# Patient Record
Sex: FEMALE | Race: BLACK OR AFRICAN AMERICAN | NOT HISPANIC OR LATINO
[De-identification: names, ages, dates, MRNs, and addresses within clinical notes are randomized per-mention and may not be internally consistent; named-entity substitution may affect disease eponyms.]

---
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
## 2022-05-15 NOTE — ED PEDIATRIC NURSE NOTE - CARDIO ASSESSMENT
[facility]    Bedside and Verbal shift change report given to [facility] (oncoming nurse) by Valentin Orellana RN (offgoing nurse). Report included the following information SBAR, Kardex, MAR and Recent Results.     Situation:   Code Status: Full Code   Hospital Day: 1   Problem List:   Hospital Problems  Date Reviewed: 9/2/2014          Codes Class Noted POA    Multiple sclerosis ([facility] ICD-10-CM: G35  ICD-9-CM: 401  5/9/2022 Unknown              Background:   Past Medical History:   Past Medical History:   Diagnosis Date    MS (multiple sclerosis) ([facility]         Assessment:   Changes in Assessment throughout shift: No change to previous assessment     Patient has a central line: no Reasons if yes: n/a  Insertion date: n/a Last dressing date: n/a   Patient has Knight Cath: no Reasons if yes: n/a   Insertion date: n/a  Shift knight care completed: NO, n/a     Last Vitals:     Vitals:    05/14/22 1414 05/14/22 1423   BP: 123/71    Pulse: 76    Resp: 18    Temp: 98 °F (36.7 °C)    SpO2: 94%    Height:  4' 11\" (1.499 m)        PAIN    Pain Assessment    Pain Intensity 1: 0 (05/15/22 0402) Pain Intensity 1: 2 (12/29/14 1105)      Pain Location 1: Abdomen      Pain Intervention(s) 1: Medication (see MAR)  Patient Stated Pain Goal: 0 Patient Stated Pain Goal: 0  o Intervention effective: yes  o Other actions taken for pain:       Skin Assessment  Skin color    Condition/Temperature    Integrity Skin Integrity: Other (comment)  Turgor    Weekly Pressure Ulcer Documentation  Pressure  Injury Documentation: No Pressure Injury Noted-Pressure Ulcer Prevention Initiated  Wound Prevention & Protection Methods  Orientation of wound Orientation of Wound Prevention: Posterior  Location of Prevention Location of Wound Prevention: Sacrum/Coccyx  Dressing Present Dressing Present : No  Dressing Status    Wound Offloading Wound Offloading (Prevention Methods): Bed, pressure reduction mattress,Elevate heels,Pillows,Wheelchair,Turning     INTAKE/OUPUT  Date 05/14/22 0700 - 05/15/22 0659 05/15/22 0700 - 05/16/22 0659   Shift [id number] 1900-0659 24 Hour Total 1200-3905 2280-5639 24 Hour Total   INTAKE   Shift Total         OUTPUT   Urine  700 700        Urine Voided  700 700        Urine Occurrence(s) 1 x 1 x 2 x      Emesis/NG output           Emesis Occurrence(s)  0 x 0 x      Stool           Stool Occurrence(s) 1 x 1 x 2 x 0 x  0 x   Shift Total  700 700      NET  -700 -700      Weight (kg)             Recommendations:  1. Patient needs and requests: Toileting, repositioning    2. Pending tests/procedures: AM labs, 5/15     3. Functional Level/Equipment: Partial (one person) / Bed (comment); Bevely Merchant; Wheelchair    Fall Precautions:   Fall risk precautions were reinforced with the patient; she was instructed to call for help prior to getting up. The following fall risk precautions were continued: bed/ chair alarms, door signage, yellow bracelet and socks as well as update of the Be-Bound [place]obia tool in the patient's room. Rupesh Score: 4    HEALS Safety Check    A safety check occurred in the patient's room between off going nurse and oncoming nurse listed above. The safety check included the below items  Area Items   H  High Alert Medications - Verify all high alert medication drips (heparin, PCA, etc.)   E  Equipment - Suction is set up for ALL patients (with yanker)  - Red plugs utilized for all equipment (IV pumps, etc.)  - WOWs wiped down at end of shift.  - Room stocked with oxygen, suction, and other unit-specific supplies   A  Alarms - Bed alarm is set for fall risk patients  - Ensure chair alarm is in place and activated if patient is up in a chair   L  Lines - Check IV for any infiltration  - Torres bag is empty if patient has a Torres   - Tubing and IV bags are labeled   S  Safety   - Room is clean, patient is clean, and equipment is clean. - Hallways are clear from equipment besides carts.    - Fall bracelet on for fall risk patients  - Ensure room is clear and free of clutter  - Suction is set up for ALL patients (with ciscoker)  - Hallways are clear from equipment besides carts.    - Isolation precautions followed, supplies available outside room, sign posted     Christie Spear RN WDL

## 2022-11-03 NOTE — ED POST DISCHARGE NOTE - REASON FOR FOLLOW-UP
Other Post discharge phone call 9/29/18 875 AM Margarito BOWEN Oxybutynin Counseling:  I discussed with the patient the risks of oxybutynin including but not limited to skin rash, drowsiness, dry mouth, difficulty urinating, and blurred vision.

## 2023-09-26 NOTE — ED PROVIDER NOTE - DISPOSITION TYPE
What Type Of Note Output Would You Prefer (Optional)?: Bullet Format
How Severe Are Your Spot(S)?: mild
Have Your Spot(S) Been Treated In The Past?: has not been treated
Hpi Title: Evaluation of Skin Lesions
ADMIT

## 2023-10-02 ENCOUNTER — EMERGENCY (EMERGENCY)
Facility: HOSPITAL | Age: 23
LOS: 1 days | Discharge: HOME | End: 2023-10-02
Attending: EMERGENCY MEDICINE | Admitting: EMERGENCY MEDICINE
Payer: COMMERCIAL

## 2023-10-02 VITALS
HEART RATE: 100 BPM | SYSTOLIC BLOOD PRESSURE: 147 MMHG | OXYGEN SATURATION: 100 % | DIASTOLIC BLOOD PRESSURE: 91 MMHG | TEMPERATURE: 98 F | RESPIRATION RATE: 18 BRPM

## 2023-10-02 VITALS
SYSTOLIC BLOOD PRESSURE: 133 MMHG | TEMPERATURE: 99 F | OXYGEN SATURATION: 100 % | DIASTOLIC BLOOD PRESSURE: 81 MMHG | HEART RATE: 67 BPM | RESPIRATION RATE: 16 BRPM

## 2023-10-02 DIAGNOSIS — F29 UNSPECIFIED PSYCHOSIS NOT DUE TO A SUBSTANCE OR KNOWN PHYSIOLOGICAL CONDITION: ICD-10-CM

## 2023-10-02 DIAGNOSIS — F12.10 CANNABIS ABUSE, UNCOMPLICATED: ICD-10-CM

## 2023-10-02 LAB
ALBUMIN SERPL ELPH-MCNC: 4.6 G/DL — SIGNIFICANT CHANGE UP (ref 3.3–5)
ALP SERPL-CCNC: 59 U/L — SIGNIFICANT CHANGE UP (ref 40–120)
ALT FLD-CCNC: 15 U/L — SIGNIFICANT CHANGE UP (ref 4–33)
AMPHET UR-MCNC: NEGATIVE — SIGNIFICANT CHANGE UP
ANION GAP SERPL CALC-SCNC: 16 MMOL/L — HIGH (ref 7–14)
APAP SERPL-MCNC: <10 UG/ML — LOW (ref 15–25)
APPEARANCE UR: CLEAR — SIGNIFICANT CHANGE UP
AST SERPL-CCNC: 15 U/L — SIGNIFICANT CHANGE UP (ref 4–32)
BACTERIA # UR AUTO: ABNORMAL /HPF
BARBITURATES UR SCN-MCNC: NEGATIVE — SIGNIFICANT CHANGE UP
BASOPHILS # BLD AUTO: 0.05 K/UL — SIGNIFICANT CHANGE UP (ref 0–0.2)
BASOPHILS NFR BLD AUTO: 0.9 % — SIGNIFICANT CHANGE UP (ref 0–2)
BENZODIAZ UR-MCNC: NEGATIVE — SIGNIFICANT CHANGE UP
BILIRUB SERPL-MCNC: 0.4 MG/DL — SIGNIFICANT CHANGE UP (ref 0.2–1.2)
BILIRUB UR-MCNC: NEGATIVE — SIGNIFICANT CHANGE UP
BUN SERPL-MCNC: 7 MG/DL — SIGNIFICANT CHANGE UP (ref 7–23)
CALCIUM SERPL-MCNC: 9.4 MG/DL — SIGNIFICANT CHANGE UP (ref 8.4–10.5)
CAST: 2 /LPF — SIGNIFICANT CHANGE UP (ref 0–4)
CHLORIDE SERPL-SCNC: 97 MMOL/L — LOW (ref 98–107)
CO2 SERPL-SCNC: 24 MMOL/L — SIGNIFICANT CHANGE UP (ref 22–31)
COCAINE METAB.OTHER UR-MCNC: NEGATIVE — SIGNIFICANT CHANGE UP
COLOR SPEC: SIGNIFICANT CHANGE UP
CREAT SERPL-MCNC: 0.86 MG/DL — SIGNIFICANT CHANGE UP (ref 0.5–1.3)
CREATININE URINE RESULT, DAU: 461 MG/DL — SIGNIFICANT CHANGE UP
DIFF PNL FLD: NEGATIVE — SIGNIFICANT CHANGE UP
EGFR: 99 ML/MIN/1.73M2 — SIGNIFICANT CHANGE UP
EOSINOPHIL # BLD AUTO: 0.04 K/UL — SIGNIFICANT CHANGE UP (ref 0–0.5)
EOSINOPHIL NFR BLD AUTO: 0.7 % — SIGNIFICANT CHANGE UP (ref 0–6)
ETHANOL SERPL-MCNC: <10 MG/DL — SIGNIFICANT CHANGE UP
FLUAV AG NPH QL: SIGNIFICANT CHANGE UP
FLUBV AG NPH QL: SIGNIFICANT CHANGE UP
GLUCOSE SERPL-MCNC: 91 MG/DL — SIGNIFICANT CHANGE UP (ref 70–99)
GLUCOSE UR QL: NEGATIVE MG/DL — SIGNIFICANT CHANGE UP
HCG SERPL-ACNC: <1 MIU/ML — SIGNIFICANT CHANGE UP
HCT VFR BLD CALC: 40.4 % — SIGNIFICANT CHANGE UP (ref 34.5–45)
HGB BLD-MCNC: 12.7 G/DL — SIGNIFICANT CHANGE UP (ref 11.5–15.5)
IANC: 3.3 K/UL — SIGNIFICANT CHANGE UP (ref 1.8–7.4)
IMM GRANULOCYTES NFR BLD AUTO: 0.4 % — SIGNIFICANT CHANGE UP (ref 0–0.9)
KETONES UR-MCNC: 80 MG/DL
LEUKOCYTE ESTERASE UR-ACNC: ABNORMAL
LYMPHOCYTES # BLD AUTO: 1.36 K/UL — SIGNIFICANT CHANGE UP (ref 1–3.3)
LYMPHOCYTES # BLD AUTO: 24.4 % — SIGNIFICANT CHANGE UP (ref 13–44)
MCHC RBC-ENTMCNC: 25.8 PG — LOW (ref 27–34)
MCHC RBC-ENTMCNC: 31.4 GM/DL — LOW (ref 32–36)
MCV RBC AUTO: 81.9 FL — SIGNIFICANT CHANGE UP (ref 80–100)
METHADONE UR-MCNC: NEGATIVE — SIGNIFICANT CHANGE UP
MONOCYTES # BLD AUTO: 0.81 K/UL — SIGNIFICANT CHANGE UP (ref 0–0.9)
MONOCYTES NFR BLD AUTO: 14.5 % — HIGH (ref 2–14)
NEUTROPHILS # BLD AUTO: 3.3 K/UL — SIGNIFICANT CHANGE UP (ref 1.8–7.4)
NEUTROPHILS NFR BLD AUTO: 59.1 % — SIGNIFICANT CHANGE UP (ref 43–77)
NITRITE UR-MCNC: NEGATIVE — SIGNIFICANT CHANGE UP
NRBC # BLD: 0 /100 WBCS — SIGNIFICANT CHANGE UP (ref 0–0)
NRBC # FLD: 0 K/UL — SIGNIFICANT CHANGE UP (ref 0–0)
OPIATES UR-MCNC: NEGATIVE — SIGNIFICANT CHANGE UP
OXYCODONE UR-MCNC: NEGATIVE — SIGNIFICANT CHANGE UP
PCP SPEC-MCNC: SIGNIFICANT CHANGE UP
PCP UR-MCNC: NEGATIVE — SIGNIFICANT CHANGE UP
PH UR: 7 — SIGNIFICANT CHANGE UP (ref 5–8)
PLATELET # BLD AUTO: 310 K/UL — SIGNIFICANT CHANGE UP (ref 150–400)
POTASSIUM SERPL-MCNC: 3.4 MMOL/L — LOW (ref 3.5–5.3)
POTASSIUM SERPL-SCNC: 3.4 MMOL/L — LOW (ref 3.5–5.3)
PROT SERPL-MCNC: 7.9 G/DL — SIGNIFICANT CHANGE UP (ref 6–8.3)
PROT UR-MCNC: 100 MG/DL
RBC # BLD: 4.93 M/UL — SIGNIFICANT CHANGE UP (ref 3.8–5.2)
RBC # FLD: 14.6 % — HIGH (ref 10.3–14.5)
RBC CASTS # UR COMP ASSIST: 4 /HPF — SIGNIFICANT CHANGE UP (ref 0–4)
RSV RNA NPH QL NAA+NON-PROBE: SIGNIFICANT CHANGE UP
SALICYLATES SERPL-MCNC: <0.3 MG/DL — LOW (ref 15–30)
SARS-COV-2 RNA SPEC QL NAA+PROBE: SIGNIFICANT CHANGE UP
SODIUM SERPL-SCNC: 137 MMOL/L — SIGNIFICANT CHANGE UP (ref 135–145)
SP GR SPEC: 1.03 — HIGH (ref 1–1.03)
SQUAMOUS # UR AUTO: 2 /HPF — SIGNIFICANT CHANGE UP (ref 0–5)
THC UR QL: POSITIVE
TOXICOLOGY SCREEN, DRUGS OF ABUSE, SERUM RESULT: SIGNIFICANT CHANGE UP
TSH SERPL-MCNC: 2.26 UIU/ML — SIGNIFICANT CHANGE UP (ref 0.27–4.2)
UROBILINOGEN FLD QL: 1 MG/DL — SIGNIFICANT CHANGE UP (ref 0.2–1)
WBC # BLD: 5.58 K/UL — SIGNIFICANT CHANGE UP (ref 3.8–10.5)
WBC # FLD AUTO: 5.58 K/UL — SIGNIFICANT CHANGE UP (ref 3.8–10.5)
WBC UR QL: 25 /HPF — HIGH (ref 0–5)

## 2023-10-02 PROCEDURE — 70450 CT HEAD/BRAIN W/O DYE: CPT | Mod: 26,ME

## 2023-10-02 PROCEDURE — 99285 EMERGENCY DEPT VISIT HI MDM: CPT

## 2023-10-02 PROCEDURE — G1004: CPT

## 2023-10-02 PROCEDURE — 99284 EMERGENCY DEPT VISIT MOD MDM: CPT

## 2023-10-02 RX ORDER — FLUOXETINE HCL 10 MG
1 CAPSULE ORAL
Qty: 5 | Refills: 0
Start: 2023-10-02 | End: 2023-10-06

## 2023-10-02 RX ORDER — NITROFURANTOIN MACROCRYSTAL 50 MG
1 CAPSULE ORAL
Qty: 14 | Refills: 0
Start: 2023-10-02 | End: 2023-10-08

## 2023-10-02 RX ORDER — NITROFURANTOIN MACROCRYSTAL 50 MG
100 CAPSULE ORAL ONCE
Refills: 0 | Status: COMPLETED | OUTPATIENT
Start: 2023-10-02 | End: 2023-10-02

## 2023-10-02 RX ADMIN — Medication 100 MILLIGRAM(S): at 09:30

## 2023-10-02 RX ADMIN — Medication 2 MILLIGRAM(S): at 04:33
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
## 2023-10-02 NOTE — ED BEHAVIORAL HEALTH ASSESSMENT NOTE - NS ED BHA PLAN TR REFERRAL APPT DISCUSSED2 FT
encouraged to follow up with Dr Mueller this week. as back up, Pt was given resource to the community like [facility]

## 2023-10-02 NOTE — ED BEHAVIORAL HEALTH ASSESSMENT NOTE - DESCRIPTION
pt is labile, tearful,  at times smiling   Vital Signs Last 24 Hrs  T(C): 36.5 (02 Oct 2023 04:33), Max: 36.9 (02 Oct 2023 00:41)  T(F): 97.7 (02 Oct 2023 04:33), Max: 98.5 (02 Oct 2023 00:41)  HR: 70 (02 Oct 2023 04:33) (70 - 100)  BP: 113/81 (02 Oct 2023 04:33) (113/81 - 147/91)  BP(mean): 91 (02 Oct 2023 04:33) (91 - 91)  RR: 16 (02 Oct 2023 04:33) (16 - 18)  SpO2: 100% (02 Oct 2023 04:33) (100% - 100%)    Parameters below as of 02 Oct 2023 04:33  Patient On (Oxygen Delivery Method): room air single, college student none pt is labile, tearful,  at times smiling   Vital Signs Last 24 Hrs  T(C): 36.5 (02 Oct 2023 04:33), Max: 36.9 (02 Oct 2023 00:41)  T(F): 97.7 (02 Oct 2023 04:33), Max: 98.5 (02 Oct 2023 00:41)  HR: 70 (02 Oct 2023 04:33) (70 - 100)  BP: 113/81 (02 Oct 2023 04:33) (113/81 - 147/91)  BP(mean): 91 (02 Oct 2023 04:33) (91 - 91)  RR: 16 (02 Oct 2023 04:33) (16 - 18)  SpO2: 100% (02 Oct 2023 04:33) (100% - 100%)    Parameters below as of 02 Oct 2023 04:33  Patient On (Oxygen Delivery Method): room air    ON RE-EVALUATION (Dr Martinez, 10/2/2023 at 9:30AM):  There is no reported agitation/aggressive behavior.  No verbalization of active/ passive SI/HI.   There are no signs/symptoms of acute beck or florid psychosis.  she is not psychomotorically retarded. whilst anxious, nothing suggestive that she is in severe anxiety.  Pt is not showing any signs/symptoms of impending withdrawal.  she is not delirious.. Pt has not tested limits.. Has maintained appropriate boundaries. Pt has been easily redirected.  Overall, there has been no management issues.
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
## 2023-10-02 NOTE — ED PROVIDER NOTE - OBJECTIVE STATEMENT
22-year-old female with no significant past medical history presenting with odd behaviors over the last week.  The patient has been very tearful crying uncontrollably at times.  He also not slept for the last few days.  She does report at times she is hearing her parents voice in her head when they are not they are talking about divorce and financial difficulties.  She has concerns that people are talking about her at school behind her back as well.  No previous history of similar symptoms.  No SI HI.

## 2023-10-02 NOTE — ED BEHAVIORAL HEALTH ASSESSMENT NOTE - RISK ASSESSMENT
pt is at elevated risk for self harm due to mood dysregulation , insomnia   protective factors : supportive network, in school, no hx of sa, no family hx of sa  immediate risk factors: mood dyregulation, insomnia , psychosis will be mitigated by inpatient hospitalization pt is at elevated risk for self harm due to mood dysregulation , insomnia   protective factors : supportive network, in school, no hx of sa, no family hx of sa  immediate risk factors: mood dysregulation, insomnia , psychosis will be mitigated by inpatient hospitalization    ON RE-EVALUATION (Dr Martinez): the Pt is currently NOT in acute imminent risk of self harm as well as also NOT being at chronically elevated risk of self-harm.  currently, there is no identifiable acute increase in risk that  would be mitigated by an involuntary psychiatric admission. Pt recanted her request for a voluntary admission to in-Pt unit.      at this time, THE PATIENT IS NOT DEEMED AN IMMINENT THREAT TO SELF OR TO OTHERS IN THE COMMUNITY.

## 2023-10-02 NOTE — ED BEHAVIORAL HEALTH ASSESSMENT NOTE - NSBHMSETHTCONTENT_PSY_A_CORE
Delusions/Ruminations on re-evaluation, no delusions elicited. no active or passive SI or HI/Delusions/Ruminations

## 2023-10-02 NOTE — ED BEHAVIORAL HEALTH ASSESSMENT NOTE - NS ED BHA PLAN TR REFERRANT YES2 FT
extensively discussed with Pt, mother and 2 maternal aunts re: ongoing clinical course - the importance of compliance with meds and appts with Dr Mueller. discussed recommendations with Dr DESTINY Le extensively discussed with Pt, mother and 2 maternal aunts re: ongoing clinical course - the importance of compliance with meds and appts with Dr Mueller. Pt's family DID NOT EXPRESS ANY SAFETY CONCERNS FOT THIS PATIENT ENOUGH FOR  ED SERVICE TO PURSUE INVOLUNTARY PSYCH ADMISSION. in addition, discussed recommendations with Dr DESTINY Le (ED team)
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
## 2023-10-02 NOTE — ED PROVIDER NOTE - PROGRESS NOTE DETAILS
MD CHO:  I received s/o on this pt from Dr. Ortiz.  Plan:  pt was to be admitted to [facility], however, pt's family wished to have pt discharged.  Pt currently sleeping d/t ativan, per psych, they will reassess when she is awake to assess for pt safety.  Currently pt is deemed not to have capacity to leave at this time until psych clearance.  Will notify psych when pt awake. Roxane Med Tox Fellow: pt seen by , pt deemed safe for discharge, pt recinded voluntary admission. Pt has good support system at home. Pt has follow up with Dr. Perla her psychiatrist and is aware of crisis center/given info upon discharge.  Pt also with UTI, meds sent to pharmacy. Pt understands anticipatory guidance/return precautions. DC

## 2023-10-02 NOTE — ED BEHAVIORAL HEALTH ASSESSMENT NOTE - NSBHMSEBEHAV_PSY_A_CORE
Cooperative on re-evaluation, she continues to be cooperative/ not suspicious/ not paranoid/ not guarded/ not evasive/Cooperative

## 2023-10-02 NOTE — ED BEHAVIORAL HEALTH ASSESSMENT NOTE - SUMMARY
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 Perla, prescribed Prozac 20mg , no hx of sa , no hx of NSSIB , reports 2 weeks ago she was given by someone  "something to smoke", she is not sure what it was , denies other substances, drinks etoh socially , bib parents for labile mood and paranoia. 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 Perla, prescribed Prozac 20mg , no hx of sa , no hx of NSSIB , reports 2 weeks ago she was given by someone  "something to smoke", she is not sure what it was , denies other substances, drinks etoh socially , bib parents for labile mood and paranoia.    Pt presents with global insomnia, affective dysregulation , labile and psychotic sx , including ah/vh and paranoia . The sx may represent substance induced psychosis vs primary psychotic d/o vs bipolar d/o . Patient warrants psychiatric admission , and she is amenable to voluntary admission for safety and stabilization.   no beds are available at this time , pt will be boarding, family is aware and informed .  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 Mueller, prescribed Prozac 20mg , no hx of sa , no hx of NSSIB , reports 2 weeks ago she was given by someone  "something to smoke", she is not sure what it was , denies other substances, drinks etoh socially , bib parents for labile mood and paranoia.    Pt presents with global insomnia, affective dysregulation , labile and psychotic sx , including ah/vh and paranoia . The sx may represent substance induced psychosis vs primary psychotic d/o vs bipolar d/o . Patient warrants psychiatric admission , and she is amenable to voluntary admission for safety and stabilization.   no beds are available at this time , pt will be boarding, family is aware and informed .  no 1:1 required on the unit, not aggressive, and not suicidal    PROGRESS (Dr TUNDE Martinez reevaluated bedside at 9:30AM, 10/2/2023):   23/F with self reported hx of depression - currently on Prozac; no psych admissions; no reported hx of SA/ not engaged in any NSSIB;  Pt denied any hx of illicit substance use.  early this AM, presented to the ED accompanied by parents due to psychosis presenting as perceptual disturbances + paranoia with associated global insomnia and lability.      on re-evaluation, the Pt is not manifesting any SIGNS/ SYMPTOMS OF FLORID ACUTE PSYCHOSIS. SHE IS ABLE TO ENGAGE IN A MEANINGFUL/ LOGICAL/ COHERENT CONVERSATION.  Pt is NOT ACUTELY DISORGANIZED IN THOUGHT PROCESS, IS NOT DISORGANIZED IN SPEECH; DOES NOT APPEAR TO BE ACTIVELY INTERNALLY STIMULATED; NOT ENGAGED IN ANY STEREOTYPICAL BEHAVIOR.      in addition, whilst she did endorse feeling sad (for which she is being prescribed Prozac 20mg daily), the Pt is not manifesting any signs/ symptoms suggestive of severe MDD; No verbalization of active/ passive SI/HI.  whilst there is anxiety endorsed - anxiety symptoms is not psychotically driven at this time nor does Pt exhibit any signs/ symptoms suggestive of any severe specific anxiety disorder symptoms.  Pt is manifesting any signs/symptoms of acute beck.  Tox screen yielded + for THC; BAL < 10.  at this time, the Pt is not showing any signs/symptoms of acute intoxication or impending withdrawal.  Pt is not delirious.. Pt is easily redirected.  Overall, there has been no management issues reported overnight.      at this time,  ED service DOES NOT HAVE ANY JUSTIFICATION TO PURSUE AN INVOLUNTARY PSYCH ADMISSION. differentials to entertain for this case: primary psychosis vs substance induced psychosis - leaning more towards substance induced psychosis.  Pt initially expressed interest towards voluntary admission.  however, on re-evaluation, she rescinded this request.  Pt will be discharged back to the community.    RECOMMENDATIONS:   1. Psychoeducation provided.  Encouraged follow up with OP psych services and the importance of compliance with meds and psych out-Pt appts.  continue compliance with Prozac 20mg daily for control of depression + anxiety.  currently,  ED svc opines that there is no indication for initiation of emergent antipsychotic meds. initiation of this medication can be done on an out-Pt basis - if treating MD deems it appropriate for this Pt's case.  we also discussed impact of THC on her health and well being as well as the importance of sobriety.   2. Emergency protocol reviewed.  Pt, mother and maternal aunts were adviced to call 911 or come to the nearest ED should symptoms worsen; have increasing bouts of agitation/aggressive behavior; having SI/HI; or call [phone]    3. given resource to the community like [facility] (as back up if Dr Mueller is unable to follow up with her)  4. psych med prescribed: Prozac 20mg x 5 day supply

## 2023-10-02 NOTE — ED PROVIDER NOTE - PHYSICAL EXAMINATION
Vitals: I have reviewed the patients vital signs  General: nontoxic appearing  HEENT: Atraumatic, normocephalic, airway patent  Eyes: EOMI, tracking appropriately  Neck: no tracheal deviation  Chest/Lungs: no trauma, symmetric chest rise, speaking in complete sentences,  no resp distress  Heart: skin and extremities well perfused, regular rate and rhythm  Neuro: A+Ox3, appears non focal  MSK: no deformities  Skin: no cyanosis, no jaundice   Psych:  labile mood odd affect starts crying uncontrollably tangential speech

## 2023-10-02 NOTE — ED ADULT NURSE REASSESSMENT NOTE - NS ED NURSE REASSESS COMMENT FT1
Received report from PM RN. Patient is asleep and has family at bedside. VS stable and patient appears comfortable. Waiting for further evaluation and disposition.   JESSI Waldrop

## 2023-10-02 NOTE — ED PROVIDER NOTE - IV ALTEPLASE EXCL REL HIDDEN
show Anesthesia Volume In Cc: 0 Did You Provide Opioid Counseling: No Repair Type: Complex Repair Suturegard Retention Suture: 2-0 Nylon Retention Suture Bite Size: 3 mm Length To Time In Minutes Device Was In Place: 10 Number Of Hemigard Strips Per Side: 1 Simple / Intermediate / Complex Repair - Final Wound Length In Cm: 3.4 Complex Requirements: Extensive Undermining Performed?: Yes Width Of Defect Perpendicular To Closure In Cm (Required): 2.9 Distance Of Undermining In Cm (Required): 3.7 Undermining Type: Entire Wound Debridement Text: The wound edges were debrided prior to proceeding with the closure to facilitate wound healing. Helical Rim Text: The closure involved the helical rim. Vermilion Border Text: The closure involved the vermilion border. Nostril Rim Text: The closure involved the nostril rim. Retention Suture Text: Retention sutures were placed to support the closure and prevent dehiscence. Primary Defect Length (In Cm): 1.7 Primary Defect Width (In Cm): 0.9 Skin Substitute: EpiFix Micronized Suture Removal: 14 days Type Of Previous Surgery (Optional- Ie Mohs Surgery): MOHS Date Of Previous Surgery (Optional): 06/28/2023 Mohs Case Number (Optional): [id number] Date Of Previous Biopsy (Optional): 05/16/2023 Detail Level: Detailed Anesthesia Type: 1% lidocaine with epinephrine Additional Anesthesia Volume In Cc: 6 Hemostasis: Electrocautery Estimated Blood Loss (Cc): minimal Brow Lift Text: A midfrontal incision was made medially to the defect to allow access to the tissues just superior to the left eyebrow. Following careful dissection inferiorly in a supraperiosteal plane to the level of the left eyebrow, several 3-0 monocryl sutures were used to resuspend the eyebrow orbicularis oculi muscular unit to the superior frontal bone periosteum. This resulted in an appropriate reapproximation of static eyebrow symmetry and correction of the left brow ptosis. Deep Sutures: 3-0 Polysorb Epidermal Sutures: 3-0 Nylon Epidermal Closure: simple interrupted Additional Epidermal Closure (Optional): horizontal mattress Wound Care: Petrolatum Dressing: pressure dressing with telfa Unna Boot Text: An Unna boot was placed to help immobilize the limb and facilitate more rapid healing. Suturegard Intro: Intraoperative tissue expansion was performed, utilizing the SUTUREGARD device, in order to reduce wound tension. Suturegard Body: The suture ends were repeatedly re-tightened and re-clamped to achieve the desired tissue expansion. Hemigard Intro: Due to skin fragility and wound tension, it was decided to use HEMIGARD adhesive retention suture devices to permit a linear closure. The skin was cleaned and dried for a 6cm distance away from the wound. Excessive hair, if present, was removed to allow for adhesion. Hemigard Postcare Instructions: The HEMIGARD strips are to remain completely dry for at least 5-7 days. Graft Donor Site Bandage (Optional-Leave Blank If You Don't Want In Note): Steri-strips and a pressure bandage were applied to the donor site. Closure 2 Information: This tab is for additional flaps and grafts, including complex repair and grafts and complex repair and flaps. You can also specify a different location for the additional defect, if the location is the same you do not need to select a new one. We will insert the automated text for the repair you select below just as we do for solitary flaps and grafts. Please note that at this time if you select a location with a different insurance zone you will need to override the ICD10 and CPT if appropriate. Closure 3 Information: This tab is for additional flaps and grafts above and beyond our usual structured repairs. Please note if you enter information here it will not currently bill and you will need to add the billing information manually. Closure 4 Information: This tab is for additional flaps and grafts above and beyond our usual structured repairs.  Please note if you enter information here it will not currently bill and you will need to add the billing information manually. Complex Repair Preamble Text (Leave Blank If You Do Not Want): Extensive wide undermining was performed. Intermediate Repair Preamble Text (Leave Blank If You Do Not Want): Undermining was performed with blunt dissection. Flap Thinning Complex Repair Preamble Text (Leave Blank If You Do Not Want): An incision was made along the previous flap suture line. Undermining was performed beneath the flap and redundant tissue was removed to restore the normal contour of the skin. Non-Graft Cartilage Fenestration Text: The cartilage was fenestrated with a 2mm punch biopsy to help facilitate healing. Graft Cartilage Fenestration Text: The cartilage was fenestrated with a 2mm punch biopsy to help facilitate graft survival and healing. Preparation Of Recipient Site - Flap: The eschar was removed surgically with sharp dissection to facilitate appropriate wound healing of the following adjacent tissue rearrangement. Preparation Of Recipient Site - Graft: The eschar was removed surgically with sharp dissection to facilitate appropriate survival of the following graft. Preparation Of Recipient Site - Flap Takedown: The eschar and granulation tissue was removed surgically with sharp dissection to facilitate appropriate healing after division and inset of the proximal and distal interpolation flap. Secondary Intention Text (Leave Blank If You Do Not Want): The defect will heal with secondary intention. No Repair - Repaired With Adjacent Surgical Defect Text (Leave Blank If You Do Not Want): After obtaining clear surgical margins the defect was repaired concurrently with another surgical defect which was in close approximation. Referred To Oculoplastics For Closure Text (Leave Blank If You Do Not Want): After obtaining clear surgical margins the patient was sent to oculoplastics for surgical repair. The patient understands they will receive post-surgical care and follow-up from the referring physician's office. Referred To Otolaryngology For Closure Text (Leave Blank If You Do Not Want): After obtaining clear surgical margins the patient was sent to otolaryngology for surgical repair. The patient understands they will receive post-surgical care and follow-up from the referring physician's office. Referred To Plastics For Closure Text (Leave Blank If You Do Not Want): After obtaining clear surgical margins the patient was sent to plastics for surgical repair. The patient understands they will receive post-surgical care and follow-up from the referring physician's office. Referred To Asc For Closure Text (Leave Blank If You Do Not Want): After obtaining clear surgical margins the patient was sent to an [facility] for surgical repair. The patient understands they will receive post-surgical care and follow-up from the [facility] physician. Referred To Mid-Level For Closure Text (Leave Blank If You Do Not Want): After obtaining clear surgical margins the patient was sent to a mid-level provider for surgical repair. The patient understands they will receive post-surgical care and follow-up from the mid-level provider. Repair Performed By Another Provider Text (Leave Blank If You Do Not Want): After obtaining clear surgical margins the defect was repaired by another provider. Adjacent Tissue Transfer Text: The defect edges were debeveled with a #15 scalpel blade. Given the location of the defect and the proximity to free margins an adjacent tissue transfer was deemed most appropriate. Using a sterile surgical marker, an appropriate flap was drawn incorporating the defect and placing the expected incisions within the relaxed skin tension lines where possible. The area thus outlined was incised deep to adipose tissue with a #15 scalpel blade. The skin margins were undermined to an appropriate distance in all directions utilizing iris scissors and carried over to close the primary defect. Advancement Flap (Single) Text: The defect edges were debeveled with a #15 scalpel blade. Given the location of the defect and the proximity to free margins a single advancement flap was deemed most appropriate. Using a sterile surgical marker, an appropriate advancement flap was drawn incorporating the defect and placing the expected incisions within the relaxed skin tension lines where possible. The area thus outlined was incised deep to adipose tissue with a #15 scalpel blade. The skin margins were undermined to an appropriate distance in all directions utilizing iris scissors. Following this, the designed flap was advanced and carried over into the primary defect and sutured into place. Advancement Flap (Double) Text: The defect edges were debeveled with a #15 scalpel blade. Given the location of the defect and the proximity to free margins a double advancement flap was deemed most appropriate. Using a sterile surgical marker, the appropriate advancement flaps were drawn incorporating the defect and placing the expected incisions within the relaxed skin tension lines where possible. The area thus outlined was incised deep to adipose tissue with a #15 scalpel blade. The skin margins were undermined to an appropriate distance in all directions utilizing iris scissors. Following this, the designed flaps were advanced and carried over into the primary defect and sutured into place. Burow's Advancement Flap Text: The defect edges were debeveled with a #15 scalpel blade. Given the location of the defect and the proximity to free margins a Burow's advancement flap was deemed most appropriate. Using a sterile surgical marker, the appropriate advancement flap was drawn incorporating the defect and placing the expected incisions within the relaxed skin tension lines where possible. The area thus outlined was incised deep to adipose tissue with a #15 scalpel blade. The skin margins were undermined to an appropriate distance in all directions utilizing iris scissors. Following this, the designed flap was advanced and carried over into the primary defect and sutured into place. Chonodrocutaneous Helical Advancement Flap Text: The defect edges were debeveled with a #15 scalpel blade. Given the location of the defect and the proximity to free margins a chondrocutaneous helical advancement flap was deemed most appropriate. Using a sterile surgical marker, the appropriate advancement flap was drawn incorporating the defect and placing the expected incisions within the relaxed skin tension lines where possible. The area thus outlined was incised deep to adipose tissue with a #15 scalpel blade. The skin margins were undermined to an appropriate distance in all directions utilizing iris scissors. Following this, the designed flap was advanced and carried over into the primary defect and sutured into place. Crescentic Advancement Flap Text: The defect edges were debeveled with a #15 scalpel blade. Given the location of the defect and the proximity to free margins a crescentic advancement flap was deemed most appropriate. Using a sterile surgical marker, the appropriate advancement flap was drawn incorporating the defect and placing the expected incisions within the relaxed skin tension lines where possible. The area thus outlined was incised deep to adipose tissue with a #15 scalpel blade. The skin margins were undermined to an appropriate distance in all directions utilizing iris scissors. Following this, the designed flap was advanced and carried over into the primary defect and sutured into place. A-T Advancement Flap Text: The defect edges were debeveled with a #15 scalpel blade. Given the location of the defect, shape of the defect and the proximity to free margins an A-T advancement flap was deemed most appropriate. Using a sterile surgical marker, an appropriate advancement flap was drawn incorporating the defect and placing the expected incisions within the relaxed skin tension lines where possible. The area thus outlined was incised deep to adipose tissue with a #15 scalpel blade. The skin margins were undermined to an appropriate distance in all directions utilizing iris scissors. Following this, the designed flap was advanced and carried over into the primary defect and sutured into place. O-T Advancement Flap Text: The defect edges were debeveled with a #15 scalpel blade. Given the location of the defect, shape of the defect and the proximity to free margins an O-T advancement flap was deemed most appropriate. Using a sterile surgical marker, an appropriate advancement flap was drawn incorporating the defect and placing the expected incisions within the relaxed skin tension lines where possible. The area thus outlined was incised deep to adipose tissue with a #15 scalpel blade. The skin margins were undermined to an appropriate distance in all directions utilizing iris scissors. Following this, the designed flap was advanced and carried over into the primary defect and sutured into place. O-L Flap Text: The defect edges were debeveled with a #15 scalpel blade. Given the location of the defect, shape of the defect and the proximity to free margins an O-L flap was deemed most appropriate. Using a sterile surgical marker, an appropriate advancement flap was drawn incorporating the defect and placing the expected incisions within the relaxed skin tension lines where possible. The area thus outlined was incised deep to adipose tissue with a #15 scalpel blade. The skin margins were undermined to an appropriate distance in all directions utilizing iris scissors. Following this, the designed flap was advanced and carried over into the primary defect and sutured into place. O-Z Flap Text: The defect edges were debeveled with a #15 scalpel blade. Given the location of the defect, shape of the defect and the proximity to free margins an O-Z flap was deemed most appropriate. Using a sterile surgical marker, an appropriate transposition flap was drawn incorporating the defect and placing the expected incisions within the relaxed skin tension lines where possible. The area thus outlined was incised deep to adipose tissue with a #15 scalpel blade. The skin margins were undermined to an appropriate distance in all directions utilizing iris scissors. Following this, the designed flap was carried over into the primary defect and sutured into place. Double O-Z Flap Text: The defect edges were debeveled with a #15 scalpel blade. Given the location of the defect, shape of the defect and the proximity to free margins a Double O-Z flap was deemed most appropriate. Using a sterile surgical marker, an appropriate transposition flap was drawn incorporating the defect and placing the expected incisions within the relaxed skin tension lines where possible. The area thus outlined was incised deep to adipose tissue with a #15 scalpel blade. The skin margins were undermined to an appropriate distance in all directions utilizing iris scissors. Following this, the designed flap was carried over into the primary defect and sutured into place. V-Y Flap Text: The defect edges were debeveled with a #15 scalpel blade. Given the location of the defect, shape of the defect and the proximity to free margins a V-Y flap was deemed most appropriate. Using a sterile surgical marker, an appropriate advancement flap was drawn incorporating the defect and placing the expected incisions within the relaxed skin tension lines where possible. The area thus outlined was incised deep to adipose tissue with a #15 scalpel blade. The skin margins were undermined to an appropriate distance in all directions utilizing iris scissors. Following this, the designed flap was advanced and carried over into the primary defect and sutured into place. Advancement-Rotation Flap Text: The defect edges were debeveled with a #15 scalpel blade. Given the location of the defect, shape of the defect and the proximity to free margins an advancement-rotation flap was deemed most appropriate. Using a sterile surgical marker, an appropriate flap was drawn incorporating the defect and placing the expected incisions within the relaxed skin tension lines where possible. The area thus outlined was incised deep to adipose tissue with a #15 scalpel blade. The skin margins were undermined to an appropriate distance in all directions utilizing iris scissors. Following this, the designed flap was carried over into the primary defect and sutured into place. Mercedes Flap Text: The defect edges were debeveled with a #15 scalpel blade. Given the location of the defect, shape of the defect and the proximity to free margins a Mercedes flap was deemed most appropriate. Using a sterile surgical marker, an appropriate advancement flap was drawn incorporating the defect and placing the expected incisions within the relaxed skin tension lines where possible. The area thus outlined was incised deep to adipose tissue with a #15 scalpel blade. The skin margins were undermined to an appropriate distance in all directions utilizing iris scissors. Following this, the designed flap was advanced and carried over into the primary defect and sutured into place. Modified Advancement Flap Text: The defect edges were debeveled with a #15 scalpel blade. Given the location of the defect, shape of the defect and the proximity to free margins a modified advancement flap was deemed most appropriate. Using a sterile surgical marker, an appropriate advancement flap was drawn incorporating the defect and placing the expected incisions within the relaxed skin tension lines where possible. The area thus outlined was incised deep to adipose tissue with a #15 scalpel blade. The skin margins were undermined to an appropriate distance in all directions utilizing iris scissors. Following this, the designed flap was advanced and carried over into the primary defect and sutured into place. Mucosal Advancement Flap Text: Given the location of the defect, shape of the defect and the proximity to free margins a mucosal advancement flap was deemed most appropriate. Incisions were made with a 15 blade scalpel in the appropriate fashion along the cutaneous vermilion border and the mucosal lip. The remaining actinically damaged mucosal tissue was excised. The mucosal advancement flap was then elevated to the gingival sulcus with care taken to preserve the neurovascular structures and advanced into the primary defect. Care was taken to ensure that precise realignment of the vermilion border was achieved. Peng Advancement Flap Text: The defect edges were debeveled with a #15 scalpel blade. Given the location of the defect, shape of the defect and the proximity to free margins a Peng advancement flap was deemed most appropriate. Using a sterile surgical marker, an appropriate advancement flap was drawn incorporating the defect and placing the expected incisions within the relaxed skin tension lines where possible. The area thus outlined was incised deep to adipose tissue with a #15 scalpel blade. The skin margins were undermined to an appropriate distance in all directions utilizing iris scissors. Following this, the designed flap was advanced and carried over into the primary defect and sutured into place. Hatchet Flap Text: The defect edges were debeveled with a #15 scalpel blade. Given the location of the defect, shape of the defect and the proximity to free margins a hatchet flap was deemed most appropriate. Using a sterile surgical marker, an appropriate hatchet flap was drawn incorporating the defect and placing the expected incisions within the relaxed skin tension lines where possible. The area thus outlined was incised deep to adipose tissue with a #15 scalpel blade. The skin margins were undermined to an appropriate distance in all directions utilizing iris scissors. Following this, the designed flap was carried over into the primary defect and sutured into place. Rotation Flap Text: The defect edges were debeveled with a #15 scalpel blade. Given the location of the defect, shape of the defect and the proximity to free margins a rotation flap was deemed most appropriate. Using a sterile surgical marker, an appropriate rotation flap was drawn incorporating the defect and placing the expected incisions within the relaxed skin tension lines where possible. The area thus outlined was incised deep to adipose tissue with a #15 scalpel blade. The skin margins were undermined to an appropriate distance in all directions utilizing iris scissors. Following this, the designed flap was carried over into the primary defect and sutured into place. Bilateral Rotation Flap Text: The defect edges were debeveled with a #15 scalpel blade. Given the location of the defect, shape of the defect and the proximity to free margins a bilateral rotation flap was deemed most appropriate. Using a sterile surgical marker, an appropriate rotation flap was drawn incorporating the defect and placing the expected incisions within the relaxed skin tension lines where possible. The area thus outlined was incised deep to adipose tissue with a #15 scalpel blade. The skin margins were undermined to an appropriate distance in all directions utilizing iris scissors. Following this, the designed flap was carried over into the primary defect and sutured into place. Spiral Flap Text: The defect edges were debeveled with a #15 scalpel blade. Given the location of the defect, shape of the defect and the proximity to free margins a spiral flap was deemed most appropriate. Using a sterile surgical marker, an appropriate rotation flap was drawn incorporating the defect and placing the expected incisions within the relaxed skin tension lines where possible. The area thus outlined was incised deep to adipose tissue with a #15 scalpel blade. The skin margins were undermined to an appropriate distance in all directions utilizing iris scissors. Following this, the designed flap was carried over into the primary defect and sutured into place. Staged Advancement Flap Text: The defect edges were debeveled with a #15 scalpel blade. Given the location of the defect, shape of the defect and the proximity to free margins a staged advancement flap was deemed most appropriate. Using a sterile surgical marker, an appropriate advancement flap was drawn incorporating the defect and placing the expected incisions within the relaxed skin tension lines where possible. The area thus outlined was incised deep to adipose tissue with a #15 scalpel blade. The skin margins were undermined to an appropriate distance in all directions utilizing iris scissors. Following this, the designed flap was carried over into the primary defect and sutured into place. Star Wedge Flap Text: The defect edges were debeveled with a #15 scalpel blade. Given the location of the defect, shape of the defect and the proximity to free margins a star wedge flap was deemed most appropriate. Using a sterile surgical marker, an appropriate rotation flap was drawn incorporating the defect and placing the expected incisions within the relaxed skin tension lines where possible. The area thus outlined was incised deep to adipose tissue with a #15 scalpel blade. The skin margins were undermined to an appropriate distance in all directions utilizing iris scissors. Following this, the designed flap was carried over into the primary defect and sutured into place. Transposition Flap Text: The defect edges were debeveled with a #15 scalpel blade. Given the location of the defect and the proximity to free margins a transposition flap was deemed most appropriate. Using a sterile surgical marker, an appropriate transposition flap was drawn incorporating the defect. The area thus outlined was incised deep to adipose tissue with a #15 scalpel blade. The skin margins were undermined to an appropriate distance in all directions utilizing iris scissors. Following this, the designed flap was carried over into the primary defect and sutured into place. Muscle Hinge Flap Text: The defect edges were debeveled with a #15 scalpel blade. Given the size, depth and location of the defect and the proximity to free margins a muscle hinge flap was deemed most appropriate. Using a sterile surgical marker, an appropriate hinge flap was drawn incorporating the defect. The area thus outlined was incised with a #15 scalpel blade. The skin margins were undermined to an appropriate distance in all directions utilizing iris scissors. Following this, the designed flap was carried into the primary defect and sutured into place. Mustarde Flap Text: The defect edges were debeveled with a #15 scalpel blade. Given the size, depth and location of the defect and the proximity to free margins a Mustarde flap was deemed most appropriate. Using a sterile surgical marker, an appropriate flap was drawn incorporating the defect. The area thus outlined was incised with a #15 scalpel blade. The skin margins were undermined to an appropriate distance in all directions utilizing iris scissors. Following this, the designed flap was carried into the primary defect and sutured into place. Nasal Turnover Hinge Flap Text: The defect edges were debeveled with a #15 scalpel blade. Given the size, depth, location of the defect and the defect being full thickness a nasal turnover hinge flap was deemed most appropriate. Using a sterile surgical marker, an appropriate hinge flap was drawn incorporating the defect. The area thus outlined was incised with a #15 scalpel blade. The flap was designed to recreate the nasal mucosal lining and the alar rim. The skin margins were undermined to an appropriate distance in all directions utilizing iris scissors.  Following this, the designed flap was carried over into the primary defect and sutured into place Nasalis-Muscle-Based Myocutaneous Island Pedicle Flap Text: Using a #15 blade, an incision was made around the donor flap to the level of the nasalis muscle. Wide lateral undermining was then performed in both the subcutaneous plane above the nasalis muscle, and in a submuscular plane just above periosteum. This allowed the formation of a free nasalis muscle axial pedicle (based on the angular artery) which was still attached to the actual cutaneous flap, increasing its mobility and vascular viability. Hemostasis was obtained with pinpoint electrocoagulation. The flap was mobilized into position and the pivotal anchor points positioned and stabilized with buried interrupted sutures. Subcutaneous and dermal tissues were closed in a multilayered fashion with sutures. Tissue redundancies were excised, and the epidermal edges were apposed without significant tension and sutured with sutures. Orbicularis Oris Muscle Flap Text: The defect edges were debeveled with a #15 scalpel blade. Given that the defect affected the competency of the oral sphincter an orbicularis oris muscle flap was deemed most appropriate to restore this competency and normal muscle function. Using a sterile surgical marker, an appropriate flap was drawn incorporating the defect. The area thus outlined was incised with a #15 scalpel blade. Following this, the designed flap was carried over into the primary defect and sutured into place. Melolabial Transposition Flap Text: The defect edges were debeveled with a #15 scalpel blade. Given the location of the defect and the proximity to free margins a melolabial flap was deemed most appropriate. Using a sterile surgical marker, an appropriate melolabial transposition flap was drawn incorporating the defect. The area thus outlined was incised deep to adipose tissue with a #15 scalpel blade. The skin margins were undermined to an appropriate distance in all directions utilizing iris scissors. Following this, the designed flap was carried over into the primary defect and sutured into place. Rhombic Flap Text: The defect edges were debeveled with a #15 scalpel blade. Given the location of the defect and the proximity to free margins a rhombic flap was deemed most appropriate. Using a sterile surgical marker, an appropriate rhombic flap was drawn incorporating the defect. The area thus outlined was incised deep to adipose tissue with a #15 scalpel blade. The skin margins were undermined to an appropriate distance in all directions utilizing iris scissors. Following this, the designed flap was carried over into the primary defect and sutured into place. Rhomboid Transposition Flap Text: The defect edges were debeveled with a #15 scalpel blade. Given the location of the defect and the proximity to free margins a rhomboid transposition flap was deemed most appropriate. Using a sterile surgical marker, an appropriate rhomboid flap was drawn incorporating the defect. The area thus outlined was incised deep to adipose tissue with a #15 scalpel blade. The skin margins were undermined to an appropriate distance in all directions utilizing iris scissors. Following this, the designed flap was carried over into the primary defect and sutured into place. Bi-Rhombic Flap Text: The defect edges were debeveled with a #15 scalpel blade. Given the location of the defect and the proximity to free margins a bi-rhombic flap was deemed most appropriate. Using a sterile surgical marker, an appropriate rhombic flap was drawn incorporating the defect. The area thus outlined was incised deep to adipose tissue with a #15 scalpel blade. The skin margins were undermined to an appropriate distance in all directions utilizing iris scissors. Following this, the designed flap was carried over into the primary defect and sutured into place. Helical Rim Advancement Flap Text: The defect edges were debeveled with a #15 blade scalpel. Given the location of the defect and the proximity to free margins (helical rim) a double helical rim advancement flap was deemed most appropriate. Using a sterile surgical marker, the appropriate advancement flaps were drawn incorporating the defect and placing the expected incisions between the helical rim and antihelix where possible. The area thus outlined was incised through and through with a #15 scalpel blade. With a skin hook and iris scissors, the flaps were gently and sharply undermined and freed up. Folllowing this, the designed flaps were carried over into the primary defect and sutured into place. Bilateral Helical Rim Advancement Flap Text: The defect edges were debeveled with a #15 blade scalpel. Given the location of the defect and the proximity to free margins (helical rim) a bilateral helical rim advancement flap was deemed most appropriate. Using a sterile surgical marker, the appropriate advancement flaps were drawn incorporating the defect and placing the expected incisions between the helical rim and antihelix where possible. The area thus outlined was incised through and through with a #15 scalpel blade. With a skin hook and iris scissors, the flaps were gently and sharply undermined and freed up. Following this, the designed flaps were placed into the primary defect and sutured into place. Ear Star Wedge Flap Text: The defect edges were debeveled with a #15 blade scalpel. Given the location of the defect and the proximity to free margins (helical rim) an ear star wedge flap was deemed most appropriate. Using a sterile surgical marker, the appropriate flap was drawn incorporating the defect and placing the expected incisions between the helical rim and antihelix where possible. The area thus outlined was incised through and through with a #15 scalpel blade. Following this, the designed flap was carried over into the primary defect and sutured into place. Banner Transposition Flap Text: The defect edges were debeveled with a #15 scalpel blade. Given the location of the defect and the proximity to free margins a Banner transposition flap was deemed most appropriate. Using a sterile surgical marker, an appropriate flap was drawn around the defect. The area thus outlined was incised deep to adipose tissue with a #15 scalpel blade. The skin margins were undermined to an appropriate distance in all directions utilizing iris scissors. Following this, the designed flap was carried into the primary defect and sutured into place. Bilobed Flap Text: The defect edges were debeveled with a #15 scalpel blade. Given the location of the defect and the proximity to free margins a bilobe flap was deemed most appropriate. Using a sterile surgical marker, an appropriate bilobe flap drawn around the defect. The area thus outlined was incised deep to adipose tissue with a #15 scalpel blade. The skin margins were undermined to an appropriate distance in all directions utilizing iris scissors. Following this, the designed flap was carried over into the primary defect and sutured into place. Bilobed Transposition Flap Text: The defect edges were debeveled with a #15 scalpel blade. Given the location of the defect and the proximity to free margins a bilobed transposition flap was deemed most appropriate. Using a sterile surgical marker, an appropriate bilobe flap drawn around the defect. The area thus outlined was incised deep to adipose tissue with a #15 scalpel blade. The skin margins were undermined to an appropriate distance in all directions utilizing iris scissors. Following this, the designed flap was carried over into the primary defect and sutured into place. Trilobed Flap Text: The defect edges were debeveled with a #15 scalpel blade. Given the location of the defect and the proximity to free margins a trilobed flap was deemed most appropriate. Using a sterile surgical marker, an appropriate trilobed flap was drawn around the defect. The area thus outlined was incised deep to adipose tissue with a #15 scalpel blade. The skin margins were undermined to an appropriate distance in all directions utilizing iris scissors. Following this, the designed flap was carried into the primary defect and sutured into place. Dorsal Nasal Flap Text: The defect edges were debeveled with a #15 scalpel blade. Given the location of the defect and the proximity to free margins a dorsal nasal flap was deemed most appropriate. Using a sterile surgical marker, an appropriate dorsal nasal flap was drawn around the defect. The area thus outlined was incised deep to adipose tissue with a #15 scalpel blade. The skin margins were undermined to an appropriate distance in all directions utilizing iris scissors. Following this, the designed flap was carried into the primary defect and sutured into place. Island Pedicle Flap Text: The defect edges were debeveled with a #15 scalpel blade. Given the location of the defect, shape of the defect and the proximity to free margins an island pedicle advancement flap was deemed most appropriate. Using a sterile surgical marker, an appropriate advancement flap was drawn incorporating the defect, outlining the appropriate donor tissue and placing the expected incisions within the relaxed skin tension lines where possible. The area thus outlined was incised deep to adipose tissue with a #15 scalpel blade. The skin margins were undermined to an appropriate distance in all directions around the primary defect and laterally outward around the island pedicle utilizing iris scissors. There was minimal undermining beneath the pedicle flap. Following this, the flap was carried over into the primary defect and sutured into place. Island Pedicle Flap With Canthal Suspension Text: The defect edges were debeveled with a #15 scalpel blade. Given the location of the defect, shape of the defect and the proximity to free margins an island pedicle advancement flap was deemed most appropriate. Using a sterile surgical marker, an appropriate advancement flap was drawn incorporating the defect, outlining the appropriate donor tissue and placing the expected incisions within the relaxed skin tension lines where possible. The area thus outlined was incised deep to adipose tissue with a #15 scalpel blade. The skin margins were undermined to an appropriate distance in all directions around the primary defect and laterally outward around the island pedicle utilizing iris scissors. There was minimal undermining beneath the pedicle flap. A suspension suture was placed in the canthal tendon to prevent tension and prevent ectropion. Following this, the designed flap was placed into the primary defect and sutured into place. Alar Island Pedicle Flap Text: The defect edges were debeveled with a #15 scalpel blade. Given the location of the defect, shape of the defect and the proximity to the alar rim an island pedicle advancement flap was deemed most appropriate. Using a sterile surgical marker, an appropriate advancement flap was drawn incorporating the defect, outlining the appropriate donor tissue and placing the expected incisions within the nasal ala running parallel to the alar rim. The area thus outlined was incised with a #15 scalpel blade. The skin margins were undermined minimally to an appropriate distance in all directions around the primary defect and laterally outward around the island pedicle utilizing iris scissors. There was minimal undermining beneath the pedicle flap. Following this, the designed flap was carried over into the primary defect and sutured into place. Double Island Pedicle Flap Text: The defect edges were debeveled with a #15 scalpel blade. Given the location of the defect, shape of the defect and the proximity to free margins a double island pedicle advancement flap was deemed most appropriate. Using a sterile surgical marker, an appropriate advancement flap was drawn incorporating the defect, outlining the appropriate donor tissue and placing the expected incisions within the relaxed skin tension lines where possible. The area thus outlined was incised deep to adipose tissue with a #15 scalpel blade. The skin margins were undermined to an appropriate distance in all directions around the primary defect and laterally outward around the island pedicle utilizing iris scissors. There was minimal undermining beneath the pedicle flap. Following this, the flap was carried over into the primary defect and sutured into place. Island Pedicle Flap-Requiring Vessel Identification Text: The defect edges were debeveled with a #15 scalpel blade. Given the location of the defect, shape of the defect and the proximity to free margins an island pedicle advancement flap was deemed most appropriate. Using a sterile surgical marker, an appropriate advancement flap was drawn, based on the axial vessel mentioned above, incorporating the defect, outlining the appropriate donor tissue and placing the expected incisions within the relaxed skin tension lines where possible. The area thus outlined was incised deep to adipose tissue with a #15 scalpel blade. The skin margins were undermined to an appropriate distance in all directions around the primary defect and laterally outward around the island pedicle utilizing iris scissors. There was minimal undermining beneath the pedicle flap. Following this, the designed flap was carried over into the primary defect and sutured into place. Keystone Flap Text: The defect edges were debeveled with a #15 scalpel blade. Given the location of the defect, shape of the defect a keystone flap was deemed most appropriate. Using a sterile surgical marker, an appropriate keystone flap was drawn incorporating the defect, outlining the appropriate donor tissue and placing the expected incisions within the relaxed skin tension lines where possible. The area thus outlined was incised deep to adipose tissue with a #15 scalpel blade. The skin margins were undermined to an appropriate distance in all directions around the primary defect and laterally outward around the flap utilizing iris scissors. Following this, the designed flap was carried into the primary defect and sutured into place. O-T Plasty Text: The defect edges were debeveled with a #15 scalpel blade. Given the location of the defect, shape of the defect and the proximity to free margins an O-T plasty was deemed most appropriate. Using a sterile surgical marker, an appropriate O-T plasty was drawn incorporating the defect and placing the expected incisions within the relaxed skin tension lines where possible. The area thus outlined was incised deep to adipose tissue with a #15 scalpel blade. The skin margins were undermined to an appropriate distance in all directions utilizing iris scissors. Following this, the designed flap was carried over into the primary defect and sutured into place. O-Z Plasty Text: The defect edges were debeveled with a #15 scalpel blade. Given the location of the defect, shape of the defect and the proximity to free margins an O-Z plasty (double transposition flap) was deemed most appropriate. Using a sterile surgical marker, the appropriate transposition flaps were drawn incorporating the defect and placing the expected incisions within the relaxed skin tension lines where possible. The area thus outlined was incised deep to adipose tissue with a #15 scalpel blade. The skin margins were undermined to an appropriate distance in all directions utilizing iris scissors. Hemostasis was achieved with electrocautery. The flaps were then transposed and carried over into place, one clockwise and the other counterclockwise, and anchored with interrupted buried subcutaneous sutures. Double O-Z Plasty Text: The defect edges were debeveled with a #15 scalpel blade. Given the location of the defect, shape of the defect and the proximity to free margins a Double O-Z plasty (double transposition flap) was deemed most appropriate. Using a sterile surgical marker, the appropriate transposition flaps were drawn incorporating the defect and placing the expected incisions within the relaxed skin tension lines where possible. The area thus outlined was incised deep to adipose tissue with a #15 scalpel blade. The skin margins were undermined to an appropriate distance in all directions utilizing iris scissors. Hemostasis was achieved with electrocautery. The flaps were then transposed and carried over into place, one clockwise and the other counterclockwise, and anchored with interrupted buried subcutaneous sutures. V-Y Plasty Text: The defect edges were debeveled with a #15 scalpel blade. Given the location of the defect, shape of the defect and the proximity to free margins an V-Y advancement flap was deemed most appropriate. Using a sterile surgical marker, an appropriate advancement flap was drawn incorporating the defect and placing the expected incisions within the relaxed skin tension lines where possible. The area thus outlined was incised deep to adipose tissue with a #15 scalpel blade. The skin margins were undermined to an appropriate distance in all directions utilizing iris scissors. Following this, the designed flap was advanced and carried over into the primary defect and sutured into place. H Plasty Text: Given the location of the defect, shape of the defect and the proximity to free margins a H-plasty was deemed most appropriate for repair. Using a sterile surgical marker, the appropriate advancement arms of the H-plasty were drawn incorporating the defect and placing the expected incisions within the relaxed skin tension lines where possible. The area thus outlined was incised deep to adipose tissue with a #15 scalpel blade. The skin margins were undermined to an appropriate distance in all directions utilizing iris scissors. The opposing advancement arms were then advanced and carried over into place in opposite direction and anchored with interrupted buried subcutaneous sutures. W Plasty Text: The lesion was extirpated to the level of the fat with a #15 scalpel blade. Given the location of the defect, shape of the defect and the proximity to free margins a W-plasty was deemed most appropriate for repair. Using a sterile surgical marker, the appropriate transposition arms of the W-plasty were drawn incorporating the defect and placing the expected incisions within the relaxed skin tension lines where possible. The area thus outlined was incised deep to adipose tissue with a #15 scalpel blade. The skin margins were undermined to an appropriate distance in all directions utilizing iris scissors. The opposing transposition arms were then transposed and carried over into place in opposite direction and anchored with interrupted buried subcutaneous sutures. Z Plasty Text: The lesion was extirpated to the level of the fat with a #15 scalpel blade. Given the location of the defect, shape of the defect and the proximity to free margins a Z-plasty was deemed most appropriate for repair. Using a sterile surgical marker, the appropriate transposition arms of the Z-plasty were drawn incorporating the defect and placing the expected incisions within the relaxed skin tension lines where possible. The area thus outlined was incised deep to adipose tissue with a #15 scalpel blade. The skin margins were undermined to an appropriate distance in all directions utilizing iris scissors. The opposing transposition arms were then transposed and carried over into place in opposite direction and anchored with interrupted buried subcutaneous sutures. Double Z Plasty Text: The lesion was extirpated to the level of the fat with a #15 scalpel blade. Given the location of the defect, shape of the defect and the proximity to free margins a double Z-plasty was deemed most appropriate for repair. Using a sterile surgical marker, the appropriate transposition arms of the double Z-plasty were drawn incorporating the defect and placing the expected incisions within the relaxed skin tension lines where possible. The area thus outlined was incised deep to adipose tissue with a #15 scalpel blade. The skin margins were undermined to an appropriate distance in all directions utilizing iris scissors. The opposing transposition arms were then transposed and carried over into place in opposite direction and anchored with interrupted buried subcutaneous sutures. Zygomaticofacial Flap Text: Given the location of the defect, shape of the defect and the proximity to free margins a zygomaticofacial flap was deemed most appropriate for repair. Using a sterile surgical marker, the appropriate flap was drawn incorporating the defect and placing the expected incisions within the relaxed skin tension lines where possible. The area thus outlined was incised deep to adipose tissue with a #15 scalpel blade with preservation of a vascular pedicle. The skin margins were undermined to an appropriate distance in all directions utilizing iris scissors. The flap was then carried over into the defect and anchored with interrupted buried subcutaneous sutures. Cheek Interpolation Flap Text: A decision was made to reconstruct the defect utilizing an interpolation axial flap and a staged reconstruction. A telfa template was made of the defect. This telfa template was then used to outline the Cheek Interpolation flap. The donor area for the pedicle flap was then injected with anesthesia. The flap was excised through the skin and subcutaneous tissue down to the layer of the underlying musculature. The interpolation flap was carefully excised within this deep plane to maintain its blood supply. The edges of the donor site were undermined. The donor site was closed in a primary fashion. The pedicle was then rotated into position and sutured. Once the tube was sutured into place, adequate blood supply was confirmed with blanching and refill. The pedicle was then wrapped with xeroform gauze and dressed appropriately with a telfa and gauze bandage to ensure continued blood supply and protect the attached pedicle. Cheek-To-Nose Interpolation Flap Text: A decision was made to reconstruct the defect utilizing an interpolation axial flap and a staged reconstruction. A telfa template was made of the defect. This telfa template was then used to outline the Cheek-To-Nose Interpolation flap. The donor area for the pedicle flap was then injected with anesthesia. The flap was excised through the skin and subcutaneous tissue down to the layer of the underlying musculature. The interpolation flap was carefully excised within this deep plane to maintain its blood supply. The edges of the donor site were undermined. The donor site was closed in a primary fashion. The pedicle was then rotated into position and sutured. Once the tube was sutured into place, adequate blood supply was confirmed with blanching and refill. The pedicle was then wrapped with xeroform gauze and dressed appropriately with a telfa and gauze bandage to ensure continued blood supply and protect the attached pedicle. Interpolation Flap Text: A decision was made to reconstruct the defect utilizing an interpolation axial flap and a staged reconstruction. A telfa template was made of the defect. This telfa template was then used to outline the interpolation flap. The donor area for the pedicle flap was then injected with anesthesia. The flap was excised through the skin and subcutaneous tissue down to the layer of the underlying musculature. The interpolation flap was carefully excised within this deep plane to maintain its blood supply. The edges of the donor site were undermined. The donor site was closed in a primary fashion. The pedicle was then rotated into position and sutured. Once the tube was sutured into place, adequate blood supply was confirmed with blanching and refill. The pedicle was then wrapped with xeroform gauze and dressed appropriately with a telfa and gauze bandage to ensure continued blood supply and protect the attached pedicle. Melolabial Interpolation Flap Text: A decision was made to reconstruct the defect utilizing an interpolation axial flap and a staged reconstruction. A telfa template was made of the defect. This telfa template was then used to outline the melolabial interpolation flap. The donor area for the pedicle flap was then injected with anesthesia. The flap was excised through the skin and subcutaneous tissue down to the layer of the underlying musculature. The pedicle flap was carefully excised within this deep plane to maintain its blood supply. The edges of the donor site were undermined. The donor site was closed in a primary fashion. The pedicle was then rotated into position and sutured. Once the tube was sutured into place, adequate blood supply was confirmed with blanching and refill. The pedicle was then wrapped with xeroform gauze and dressed appropriately with a telfa and gauze bandage to ensure continued blood supply and protect the attached pedicle. Mastoid Interpolation Flap Text: A decision was made to reconstruct the defect utilizing an interpolation axial flap and a staged reconstruction. A telfa template was made of the defect. This telfa template was then used to outline the mastoid interpolation flap. The donor area for the pedicle flap was then injected with anesthesia. The flap was excised through the skin and subcutaneous tissue down to the layer of the underlying musculature. The pedicle flap was carefully excised within this deep plane to maintain its blood supply. The edges of the donor site were undermined. The donor site was closed in a primary fashion. The pedicle was then rotated into position and sutured. Once the tube was sutured into place, adequate blood supply was confirmed with blanching and refill. The pedicle was then wrapped with xeroform gauze and dressed appropriately with a telfa and gauze bandage to ensure continued blood supply and protect the attached pedicle. Posterior Auricular Interpolation Flap Text: A decision was made to reconstruct the defect utilizing an interpolation axial flap and a staged reconstruction. A telfa template was made of the defect. This telfa template was then used to outline the posterior auricular interpolation flap. The donor area for the pedicle flap was then injected with anesthesia. The flap was excised through the skin and subcutaneous tissue down to the layer of the underlying musculature. The pedicle flap was carefully excised within this deep plane to maintain its blood supply. The edges of the donor site were undermined. The donor site was closed in a primary fashion. The pedicle was then rotated into position and sutured. Once the tube was sutured into place, adequate blood supply was confirmed with blanching and refill. The pedicle was then wrapped with xeroform gauze and dressed appropriately with a telfa and gauze bandage to ensure continued blood supply and protect the attached pedicle. Paramedian Forehead Flap Text: A decision was made to reconstruct the defect utilizing an interpolation axial flap and a staged reconstruction. A telfa template was made of the defect. This telfa template was then used to outline the paramedian forehead pedicle flap. The donor area for the pedicle flap was then injected with anesthesia. The flap was excised through the skin and subcutaneous tissue down to the layer of the underlying musculature. The pedicle flap was carefully excised within this deep plane to maintain its blood supply. The edges of the donor site were undermined. The donor site was closed in a primary fashion. The pedicle was then rotated into position and sutured. Once the tube was sutured into place, adequate blood supply was confirmed with blanching and refill. The pedicle was then wrapped with xeroform gauze and dressed appropriately with a telfa and gauze bandage to ensure continued blood supply and protect the attached pedicle. Abbe Flap (Upper To Lower Lip) Text: The defect of the lower lip was assessed and measured. Given the location and size of the defect, an Abbe flap was deemed most appropriate. Using a sterile surgical marker, an appropriate Abbe flap was measured and drawn on the upper lip. Local anesthesia was then infiltrated. A scalpel was then used to incise the upper lip through and through the skin, vermilion, muscle and mucosa, leaving the flap pedicled on the opposite side. The flap was then rotated and transferred to the lower lip defect. The flap was then sutured into place with a three layer technique, closing the orbicularis oris muscle layer with subcutaneous buried sutures, followed by a mucosal layer and an epidermal layer. Abbe Flap (Lower To Upper Lip) Text: The defect of the upper lip was assessed and measured. Given the location and size of the defect, an Abbe flap was deemed most appropriate. Using a sterile surgical marker, an appropriate Abbe flap was measured and drawn on the lower lip. Local anesthesia was then infiltrated. A scalpel was then used to incise the upper lip through and through the skin, vermilion, muscle and mucosa, leaving the flap pedicled on the opposite side. The flap was then rotated and transferred to the lower lip defect. The flap was then sutured into place with a three layer technique, closing the orbicularis oris muscle layer with subcutaneous buried sutures, followed by a mucosal layer and an epidermal layer. Estlander Flap (Upper To Lower Lip) Text: The defect of the lower lip was assessed and measured. Given the location and size of the defect, an Estlander flap was deemed most appropriate. Using a sterile surgical marker, an appropriate Estlander flap was measured and drawn on the upper lip. Local anesthesia was then infiltrated. A scalpel was then used to incise the lateral aspect of the flap, through skin, muscle and mucosa, leaving the flap pedicled medially. The flap was then rotated and positioned to fill the lower lip defect. The flap was then sutured into place with a three layer technique, closing the orbicularis oris muscle layer with subcutaneous buried sutures, followed by a mucosal layer and an epidermal layer. Estlander Flap (Lower To Upper Lip) Text: The defect of the lower lip was assessed and measured.  Given the location and size of the defect, an Estlander flap was deemed most appropriate. Using a sterile surgical marker, an appropriate Estlander flap was measured and drawn on the upper lip. Local anesthesia was then infiltrated. A scalpel was then used to incise the lateral aspect of the flap, through skin, muscle and mucosa, leaving the flap pedicled medially.  The flap was then rotated and positioned to fill the lower lip defect.  The flap was then sutured into place with a three layer technique, closing the orbicularis oris muscle layer with subcutaneous buried sutures, followed by a mucosal layer and an epidermal layer. Cheiloplasty (Less Than 50%) Text: A decision was made to reconstruct the defect with a  cheiloplasty. The defect was undermined extensively. Additional orbicularis oris muscle was excised with a 15 blade scalpel. The defect was converted into a full thickness wedge, of less than 50% of the vertical height of the lip, to facilite a better cosmetic result. Small vessels were then tied off with 5-0 monocyrl. The orbicularis oris, superficial fascia, adipose and dermis were then reapproximated. After the deeper layers were approximated the epidermis was reapproximated with particular care given to realign the vermilion border. Cheiloplasty (Complex) Text: A decision was made to reconstruct the defect with a  cheiloplasty. The defect was undermined extensively. Additional orbicularis oris muscle was excised with a 15 blade scalpel. The defect was converted into a full thickness wedge to facilite a better cosmetic result. Small vessels were then tied off with 5-0 monocyrl. The orbicularis oris, superficial fascia, adipose and dermis were then reapproximated. After the deeper layers were approximated the epidermis was reapproximated with particular care given to realign the vermilion border. Ear Wedge Repair Text: A wedge excision was completed by carrying down an excision through the full thickness of the ear and cartilage with an inward facing Burow's triangle. The wound was then closed in a layered fashion. Full Thickness Lip Wedge Repair (Flap) Text: Given the location of the defect and the proximity to free margins a full thickness wedge repair was deemed most appropriate. Using a sterile surgical marker, the appropriate repair was drawn incorporating the defect and placing the expected incisions perpendicular to the vermilion border. The vermilion border was also meticulously outlined to ensure appropriate reapproximation during the repair. The area thus outlined was incised through and through with a #15 scalpel blade. The muscularis and dermis were reaproximated with deep sutures following hemostasis. Care was taken to realign the vermilion border before proceeding with the superficial closure. Once the vermilion was realigned the superfical and mucosal closure was finished. Ftsg Text: The defect edges were debeveled with a #15 scalpel blade. Given the location of the defect, shape of the defect and the proximity to free margins a full thickness skin graft was deemed most appropriate. Using a sterile surgical marker, the primary defect shape was transferred to the donor site. The area thus outlined was incised deep to adipose tissue with a #15 scalpel blade. The harvested graft was then trimmed of adipose tissue until only dermis and epidermis was left. The skin margins of the secondary defect were undermined to an appropriate distance in all directions utilizing iris scissors. The secondary defect was closed with interrupted buried subcutaneous sutures. The skin edges were then re-apposed with running  sutures. The skin graft was then placed in the primary defect and oriented appropriately. Split-Thickness Skin Graft Text: The defect edges were debeveled with a #15 scalpel blade. Given the location of the defect, shape of the defect and the proximity to free margins a split thickness skin graft was deemed most appropriate. Using a sterile surgical marker, the primary defect shape was transferred to the donor site. The split thickness graft was then harvested. The skin graft was then placed in the primary defect and oriented appropriately. Pinch Graft Text: The defect edges were debeveled with a #15 scalpel blade. Given the location of the defect, shape of the defect and the proximity to free margins a pinch graft was deemed most appropriate. Using a sterile surgical marker, the primary defect shape was transferred to the donor site. The area thus outlined was incised deep to adipose tissue with a #15 scalpel blade. The harvested graft was then trimmed of adipose tissue until only dermis and epidermis was left. The skin margins of the secondary defect were undermined to an appropriate distance in all directions utilizing iris scissors. The secondary defect was closed with interrupted buried subcutaneous sutures. The skin edges were then re-apposed with running  sutures. The skin graft was then placed in the primary defect and oriented appropriately. Burow's Graft Text: The defect edges were debeveled with a #15 scalpel blade. Given the location of the defect, shape of the defect, the proximity to free margins and the presence of a standing cone deformity a Burow's skin graft was deemed most appropriate. The standing cone was removed and this tissue was then trimmed to the shape of the primary defect. The adipose tissue was also removed until only dermis and epidermis were left. The skin margins of the secondary defect were undermined to an appropriate distance in all directions utilizing iris scissors. The secondary defect was closed with interrupted buried subcutaneous sutures. The skin edges were then re-apposed with running  sutures. The skin graft was then placed in the primary defect and oriented appropriately. Cartilage Graft Text: The defect edges were debeveled with a #15 scalpel blade. Given the location of the defect, shape of the defect, the fact the defect involved a full thickness cartilage defect a cartilage graft was deemed most appropriate. An appropriate donor site was identified, cleansed, and anesthetized. The cartilage graft was then harvested and transferred to the recipient site, oriented appropriately and then sutured into place. The secondary defect was then repaired using a primary closure. Composite Graft Text: The defect edges were debeveled with a #15 scalpel blade. Given the location of the defect, shape of the defect, the proximity to free margins and the fact the defect was full thickness a composite graft was deemed most appropriate. The defect was outline and then transferred to the donor site. A full thickness graft was then excised from the donor site. The graft was then placed in the primary defect, oriented appropriately and then sutured into place. The secondary defect was then repaired using a primary closure. Epidermal Autograft Text: The defect edges were debeveled with a #15 scalpel blade. Given the location of the defect, shape of the defect and the proximity to free margins an epidermal autograft was deemed most appropriate. Using a sterile surgical marker, the primary defect shape was transferred to the donor site. The epidermal graft was then harvested. The skin graft was then placed in the primary defect and oriented appropriately. Dermal Autograft Text: The defect edges were debeveled with a #15 scalpel blade. Given the location of the defect, shape of the defect and the proximity to free margins a dermal autograft was deemed most appropriate. Using a sterile surgical marker, the primary defect shape was transferred to the donor site. The area thus outlined was incised deep to adipose tissue with a #15 scalpel blade. The harvested graft was then trimmed of adipose and epidermal tissue until only dermis was left. The skin graft was then placed in the primary defect and oriented appropriately. Skin Substitute Text: The defect edges were debeveled with a #15 scalpel blade. Given the location of the defect, shape of the defect and the proximity to free margins a skin substitute graft was deemed most appropriate. The graft material was trimmed to fit the size of the defect. The graft was then placed in the primary defect and oriented appropriately. Skin Substitute Paste Text: The defect edges were debeveled with a #15 scalpel blade. Given the location of the defect, shape of the defect and the proximity to free margins a skin substitute micronized graft was deemed most appropriate. The entire vial contents were admixed with 0.5ccs of sterile saline, formed into a paste and then evenly spread over the entire wound bed. Skin Substitute Injection Text: The defect edges were debeveled with a #15 scalpel blade. Given the location of the defect, shape of the defect and the proximity to free margins a skin substitute micronized graft was deemed most appropriate. The entire vial contents were admixed with 3.0ccs of sterile saline and then injected subcutaneously throughout the entire wound bed. Tissue Cultured Epidermal Autograft Text: The defect edges were debeveled with a #15 scalpel blade. Given the location of the defect, shape of the defect and the proximity to free margins a tissue cultured epidermal autograft was deemed most appropriate. The graft was then trimmed to fit the size of the defect. The graft was then placed in the primary defect and oriented appropriately. Xenograft Text: The defect edges were debeveled with a #15 scalpel blade. Given the location of the defect, shape of the defect and the proximity to free margins a xenograft was deemed most appropriate. The graft was then trimmed to fit the size of the defect. The graft was then placed in the primary defect and oriented appropriately. Purse String (Simple) Text: Given the location of the defect and the characteristics of the surrounding skin a purse string closure was deemed most appropriate. Undermining was performed circumferentially around the surgical defect. A purse string suture was then placed and tightened. Purse String (Intermediate) Text: Given the location of the defect and the characteristics of the surrounding skin a purse string intermediate closure was deemed most appropriate. Undermining was performed circumferentially around the surgical defect. A purse string suture was then placed and tightened. Partial Purse String (Simple) Text: Given the location of the defect and the characteristics of the surrounding skin a simple purse string closure was deemed most appropriate. Undermining was performed circumferentially around the surgical defect. A purse string suture was then placed and tightened. Wound tension only allowed a partial closure of the circular defect. Partial Purse String (Intermediate) Text: Given the location of the defect and the characteristics of the surrounding skin an intermediate purse string closure was deemed most appropriate. Undermining was performed circumferentially around the surgical defect. A purse string suture was then placed and tightened. Wound tension only allowed a partial closure of the circular defect. Localized Dermabrasion Text: The patient was draped in routine manner. Localized dermabrasion using 3 x 17 mm wire brush was performed in routine manner to papillary dermis. This spot dermabrasion is being performed to complete skin cancer reconstruction. It also will eliminate the other sun damaged precancerous cells that are known to be part of the regional effect of a lifetime's worth of sun exposure. This localized dermabrasion is therapeutic and should not be considered cosmetic in any regard. Tarsorrhaphy Text: A tarsorrhaphy was performed using Frost sutures. Intermediate Repair And Flap Additional Text (Will Appearing After The Standard Complex Repair Text): The intermediate repair was not sufficient to completely close the primary defect. The remaining additional defect was repaired with the flap mentioned below. Intermediate Repair And Graft Additional Text (Will Appearing After The Standard Complex Repair Text): The intermediate repair was not sufficient to completely close the primary defect. The remaining additional defect was repaired with the graft mentioned below. Complex Repair And Flap Additional Text (Will Appearing After The Standard Complex Repair Text): The complex repair was not sufficient to completely close the primary defect. The remaining additional defect was repaired with the flap mentioned below. Complex Repair And Graft Additional Text (Will Appearing After The Standard Complex Repair Text): The complex repair was not sufficient to completely close the primary defect. The remaining additional defect was repaired with the graft mentioned below. Cheek Interpolation Flap Division And Inset Text: Division and inset of the cheek interpolation flap was performed to achieve optimal aesthetic result, restore normal anatomic appearance and avoid distortion of normal anatomy, expedite and facilitate wound healing, achieve optimal functional result and because linear closure either not possible or would produce suboptimal result. The patient was prepped and draped in the usual manner. The pedicle was infiltrated with local anesthesia. The pedicle was sectioned with a #15 blade. The pedicle was de-bulked and trimmed to match the shape of the defect. Hemostasis was achieved. The flap donor site and free margin of the flap were secured with deep buried sutures and the wound edges were re-approximated. Cheek To Nose Interpolation Flap Division And Inset Text: Division and inset of the cheek to nose interpolation flap was performed to achieve optimal aesthetic result, restore normal anatomic appearance and avoid distortion of normal anatomy, expedite and facilitate wound healing, achieve optimal functional result and because linear closure either not possible or would produce suboptimal result. The patient was prepped and draped in the usual manner. The pedicle was infiltrated with local anesthesia. The pedicle was sectioned with a #15 blade. The pedicle was de-bulked and trimmed to match the shape of the defect. Hemostasis was achieved. The flap donor site and free margin of the flap were secured with deep buried sutures and the wound edges were re-approximated. Melolabial Interpolation Flap Division And Inset Text: Division and inset of the melolabial interpolation flap was performed to achieve optimal aesthetic result, restore normal anatomic appearance and avoid distortion of normal anatomy, expedite and facilitate wound healing, achieve optimal functional result and because linear closure either not possible or would produce suboptimal result. The patient was prepped and draped in the usual manner. The pedicle was infiltrated with local anesthesia. The pedicle was sectioned with a #15 blade. The pedicle was de-bulked and trimmed to match the shape of the defect. Hemostasis was achieved. The flap donor site and free margin of the flap were secured with deep buried sutures and the wound edges were re-approximated. Mastoid Interpolation Flap Division And Inset Text: Division and inset of the mastoid interpolation flap was performed to achieve optimal aesthetic result, restore normal anatomic appearance and avoid distortion of normal anatomy, expedite and facilitate wound healing, achieve optimal functional result and because linear closure either not possible or would produce suboptimal result. The patient was prepped and draped in the usual manner. The pedicle was infiltrated with local anesthesia. The pedicle was sectioned with a #15 blade. The pedicle was de-bulked and trimmed to match the shape of the defect. Hemostasis was achieved. The flap donor site and free margin of the flap were secured with deep buried sutures and the wound edges were re-approximated. Paramedian Forehead Flap Division And Inset Text: Division and inset of the paramedian forehead flap was performed to achieve optimal aesthetic result, restore normal anatomic appearance and avoid distortion of normal anatomy, expedite and facilitate wound healing, achieve optimal functional result and because linear closure either not possible or would produce suboptimal result. The patient was prepped and draped in the usual manner. The pedicle was infiltrated with local anesthesia. The pedicle was sectioned with a #15 blade. The pedicle was de-bulked and trimmed to match the shape of the defect. Hemostasis was achieved. The flap donor site and free margin of the flap were secured with deep buried sutures and the wound edges were re-approximated. Posterior Auricular Interpolation Flap Division And Inset Text: Division and inset of the posterior auricular interpolation flap was performed to achieve optimal aesthetic result, restore normal anatomic appearance and avoid distortion of normal anatomy, expedite and facilitate wound healing, achieve optimal functional result and because linear closure either not possible or would produce suboptimal result. The patient was prepped and draped in the usual manner. The pedicle was infiltrated with local anesthesia. The pedicle was sectioned with a #15 blade. The pedicle was de-bulked and trimmed to match the shape of the defect. Hemostasis was achieved. The flap donor site and free margin of the flap were secured with deep buried sutures and the wound edges were re-approximated. Manual Repair Warning Statement: We plan on removing the manually selected variable below in favor of our much easier automatic structured text blocks found in the previous tab. We decided to do this to help make the flow better and give you the full power of structured data. Manual selection is never going to be ideal in our platform and I would encourage you to avoid using manual selection from this point on, especially since I will be sunsetting this feature. It is important that you do one of two things with the customized text below. First, you can save all of the text in a word file so you can have it for future reference. Second, transfer the text to the appropriate area in the Library tab. Lastly, if there is a flap or graft type which we do not have you need to let us know right away so I can add it in before the variable is hidden. No need to panic, we plan to give you roughly 6 months to make the change. Consent: The rationale for the repair was explained to the patient and consent was obtained. The risks, benefits and alternatives to therapy were discussed in detail. Specifically, the risks of infection, scarring, bleeding, prolonged wound healing, incomplete removal, allergy to anesthesia, nerve injury and recurrence were addressed. Prior to the procedure, the treatment site was clearly identified and confirmed by the patient. All components of Universal Protocol/PAUSE Rule completed. Post-Care Instructions: I reviewed with the patient in detail post-care instructions. Patient is not to engage in any heavy lifting, exercise, or swimming for the next 14 days. Should the patient develop any fevers, chills, bleeding, severe pain patient will contact the office immediately. Pain Refusal Text: I offered to prescribe pain medication but the patient refused to take this medication. Where Do You Want The Question To Include Opioid Counseling Located?: Case Summary Tab Information: Selecting Yes will display possible errors in your note based on the variables you have selected. This validation is only offered as a suggestion for you. PLEASE NOTE THAT THE VALIDATION TEXT WILL BE REMOVED WHEN YOU FINALIZE YOUR NOTE. IF YOU WANT TO FAX A PRELIMINARY NOTE YOU WILL NEED TO TOGGLE THIS TO 'NO' IF YOU DO NOT WANT IT IN YOUR FAXED NOTE.

## 2023-10-02 NOTE — ED ADULT NURSE REASSESSMENT NOTE - DESCRIPTION
Patient is calm and asleep with family at bedside. Awaiting patient to awake for reevaluation by psychiatry and discharge to family if agreed upon by MD.

## 2023-10-02 NOTE — ED PROVIDER NOTE - CLINICAL SUMMARY MEDICAL DECISION MAKING FREE TEXT BOX
22-year-old female no signal past medical history presenting with new emotional lability with tearing as well as some signs and concerns for a new onset psychosis or beck.  We will get laboratory studies as well as a CT scan as this is the patient's first episode.  We will consult the behavioral health team and disposition per their recommendations assuming no medical cause can be found

## 2023-10-02 NOTE — ED BEHAVIORAL HEALTH ASSESSMENT NOTE - NS ED BHA REVIEW OF ED CHART AVAILABLE LABS REVIEWED
PRINCIPAL DISCHARGE DIAGNOSIS  Diagnosis: PFO (patent foramen ovale)  Assessment and Plan of Treatment:       SECONDARY DISCHARGE DIAGNOSES  Diagnosis: Mild mitral regurgitation  Assessment and Plan of Treatment:     
Yes

## 2023-10-02 NOTE — ED BEHAVIORAL HEALTH ASSESSMENT NOTE - NSBHMSEPERCEPT_PSY_A_CORE
Auditory hallucinations on re-evaluation, she DENIED experiencing any ongoing perceptual disturbances/Auditory hallucinations

## 2023-10-02 NOTE — ED ADULT TRIAGE NOTE - CHIEF COMPLAINT QUOTE
Pt brought in by Mom - per Mom patient was brought to hospital near school for crying a lot. Pt denies SI/HI/AV/VH. No psych history. Pt calm, expressing social stressors at school but difficult to obtain info from.

## 2023-10-02 NOTE — ED PROVIDER NOTE - NSFOLLOWUPINSTRUCTIONS_ED_ALL_ED_FT
- Please follow up with your Psychiatrist Dr. Perla    - Please follow up with your Primary Care Doctor within 1 week. Bring your results from today.    - Take prescribed medication as indicated:  Macrobid    - Be sure to return to the ED if you develop new, worsening, or any distressing symptoms.    ---------------------------------------------------------------------------------------------------------------------------  [facility] Information:    -Walk-in hours: Monday to Friday, 9am to 3pm   -Almost all walk-in patients will be able to see a psych prescriber the same day   -Scheduled, non-urgent, evening remote/virtual appointments are available on a limited basis. Call our  to inquire about these: [phone]. A crisis center clinician screens these requests in the late afternoon and if appropriate it takes a few days to set-up.   -Visits take about 2 to 4 hours total   -Mornings are the best time for patients to arrive    For Telehealth options try:  [facility]: [email or web address] (to access psychiatrist or therapist)  [facility]: [email or web address] (to access psychiatrist or therapist)  [facility]: [email or web address] (Largest online therapy group)

## 2023-10-02 NOTE — ED ADULT NURSE NOTE - OBJECTIVE STATEMENT
Patient received to 26, A/Ox4, ambulatory, presenting for psychiatric evaluation. Per patient's mother, patient has been more emotional and is having paranoid thoughts. Patient states she is having a hard time remembering things and believes that people are talking about her. Denies SI/HI/VH/AH and drug/ETOH use. Patient belongings given to parents. Butterflied for labs. Awaiting CT.

## 2023-10-02 NOTE — ED PROVIDER NOTE - PATIENT PORTAL LINK FT
You can access the FollowMyHealth Patient Portal offered by [facility] by registering at the following website: [email or web address]. By joining [facility]’s FollowMyHealth portal, you will also be able to view your health information using other applications (apps) compatible with our system.
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
## 2023-10-02 NOTE — ED BEHAVIORAL HEALTH ASSESSMENT NOTE - GENERAL APPEARANCE
No deformities present on re-evaluation, she DOES NOT APPEAR TO BE INTERNALLY STIMULATED/No deformities present

## 2023-10-02 NOTE — ED ADULT NURSE REASSESSMENT NOTE - NSFALLUNIVINTERV_ED_ALL_ED
Bed/Stretcher in lowest position, wheels locked, appropriate side rails in place/Call bell, personal items and telephone in reach/Instruct patient to call for assistance before getting out of bed/chair/stretcher/Non-slip footwear applied when patient is off stretcher/[place] to call system/Physically safe environment - no spills, clutter or unnecessary equipment/Purposeful proactive rounding/Room/bathroom lighting operational, light cord in reach

## 2023-10-02 NOTE — ED ADULT NURSE NOTE - NSFALLUNIVINTERV_ED_ALL_ED
Bed/Stretcher in lowest position, wheels locked, appropriate side rails in place/Call bell, personal items and telephone in reach/Instruct patient to call for assistance before getting out of bed/chair/stretcher/Non-slip footwear applied when patient is off stretcher/[place] to call system/Physically safe environment - no spills, clutter or unnecessary equipment/Purposeful proactive rounding/Room/bathroom lighting operational, light cord in reach

## 2023-10-05 ENCOUNTER — INPATIENT (INPATIENT)
Facility: HOSPITAL | Age: 23
LOS: 28 days | Discharge: HOME | End: 2023-11-03
Attending: STUDENT IN AN ORGANIZED HEALTH CARE EDUCATION/TRAINING PROGRAM | Admitting: PSYCHIATRY & NEUROLOGY
Payer: COMMERCIAL

## 2023-10-05 VITALS
SYSTOLIC BLOOD PRESSURE: 136 MMHG | HEART RATE: 110 BPM | RESPIRATION RATE: 16 BRPM | TEMPERATURE: 98 F | OXYGEN SATURATION: 99 % | DIASTOLIC BLOOD PRESSURE: 94 MMHG

## 2023-10-05 DIAGNOSIS — F29 UNSPECIFIED PSYCHOSIS NOT DUE TO A SUBSTANCE OR KNOWN PHYSIOLOGICAL CONDITION: ICD-10-CM

## 2023-10-05 LAB
ALBUMIN SERPL ELPH-MCNC: 4.3 G/DL — SIGNIFICANT CHANGE UP (ref 3.3–5)
ALP SERPL-CCNC: 54 U/L — SIGNIFICANT CHANGE UP (ref 40–120)
ALT FLD-CCNC: 15 U/L — SIGNIFICANT CHANGE UP (ref 4–33)
AMPHET UR-MCNC: NEGATIVE — SIGNIFICANT CHANGE UP
ANION GAP SERPL CALC-SCNC: 11 MMOL/L — SIGNIFICANT CHANGE UP (ref 7–14)
APAP SERPL-MCNC: <10 UG/ML — LOW (ref 15–25)
APPEARANCE UR: CLEAR — SIGNIFICANT CHANGE UP
AST SERPL-CCNC: 16 U/L — SIGNIFICANT CHANGE UP (ref 4–32)
BACTERIA # UR AUTO: NEGATIVE /HPF — SIGNIFICANT CHANGE UP
BARBITURATES UR SCN-MCNC: NEGATIVE — SIGNIFICANT CHANGE UP
BASOPHILS # BLD AUTO: 0.05 K/UL — SIGNIFICANT CHANGE UP (ref 0–0.2)
BASOPHILS NFR BLD AUTO: 0.7 % — SIGNIFICANT CHANGE UP (ref 0–2)
BENZODIAZ UR-MCNC: NEGATIVE — SIGNIFICANT CHANGE UP
BILIRUB SERPL-MCNC: <0.2 MG/DL — SIGNIFICANT CHANGE UP (ref 0.2–1.2)
BILIRUB UR-MCNC: ABNORMAL
BUN SERPL-MCNC: 8 MG/DL — SIGNIFICANT CHANGE UP (ref 7–23)
CALCIUM SERPL-MCNC: 8.8 MG/DL — SIGNIFICANT CHANGE UP (ref 8.4–10.5)
CAST: 3 /LPF — SIGNIFICANT CHANGE UP (ref 0–4)
CHLORIDE SERPL-SCNC: 105 MMOL/L — SIGNIFICANT CHANGE UP (ref 98–107)
CO2 SERPL-SCNC: 24 MMOL/L — SIGNIFICANT CHANGE UP (ref 22–31)
COCAINE METAB.OTHER UR-MCNC: NEGATIVE — SIGNIFICANT CHANGE UP
COLOR SPEC: SIGNIFICANT CHANGE UP
CREAT SERPL-MCNC: 0.89 MG/DL — SIGNIFICANT CHANGE UP (ref 0.5–1.3)
CREATININE URINE RESULT, DAU: 297 MG/DL — SIGNIFICANT CHANGE UP
DIFF PNL FLD: ABNORMAL
EGFR: 95 ML/MIN/1.73M2 — SIGNIFICANT CHANGE UP
EOSINOPHIL # BLD AUTO: 0.17 K/UL — SIGNIFICANT CHANGE UP (ref 0–0.5)
EOSINOPHIL NFR BLD AUTO: 2.3 % — SIGNIFICANT CHANGE UP (ref 0–6)
ETHANOL SERPL-MCNC: <10 MG/DL — SIGNIFICANT CHANGE UP
GLUCOSE SERPL-MCNC: 104 MG/DL — HIGH (ref 70–99)
GLUCOSE UR QL: NEGATIVE MG/DL — SIGNIFICANT CHANGE UP
HCG SERPL-ACNC: <1 MIU/ML — SIGNIFICANT CHANGE UP
HCT VFR BLD CALC: 39.7 % — SIGNIFICANT CHANGE UP (ref 34.5–45)
HGB BLD-MCNC: 12.5 G/DL — SIGNIFICANT CHANGE UP (ref 11.5–15.5)
IANC: 4.67 K/UL — SIGNIFICANT CHANGE UP (ref 1.8–7.4)
IMM GRANULOCYTES NFR BLD AUTO: 0.1 % — SIGNIFICANT CHANGE UP (ref 0–0.9)
KETONES UR-MCNC: ABNORMAL MG/DL
LEUKOCYTE ESTERASE UR-ACNC: ABNORMAL
LYMPHOCYTES # BLD AUTO: 1.57 K/UL — SIGNIFICANT CHANGE UP (ref 1–3.3)
LYMPHOCYTES # BLD AUTO: 21.6 % — SIGNIFICANT CHANGE UP (ref 13–44)
MCHC RBC-ENTMCNC: 25.7 PG — LOW (ref 27–34)
MCHC RBC-ENTMCNC: 31.5 GM/DL — LOW (ref 32–36)
MCV RBC AUTO: 81.5 FL — SIGNIFICANT CHANGE UP (ref 80–100)
METHADONE UR-MCNC: NEGATIVE — SIGNIFICANT CHANGE UP
MONOCYTES # BLD AUTO: 0.79 K/UL — SIGNIFICANT CHANGE UP (ref 0–0.9)
MONOCYTES NFR BLD AUTO: 10.9 % — SIGNIFICANT CHANGE UP (ref 2–14)
NEUTROPHILS # BLD AUTO: 4.67 K/UL — SIGNIFICANT CHANGE UP (ref 1.8–7.4)
NEUTROPHILS NFR BLD AUTO: 64.4 % — SIGNIFICANT CHANGE UP (ref 43–77)
NITRITE UR-MCNC: NEGATIVE — SIGNIFICANT CHANGE UP
NRBC # BLD: 0 /100 WBCS — SIGNIFICANT CHANGE UP (ref 0–0)
NRBC # FLD: 0 K/UL — SIGNIFICANT CHANGE UP (ref 0–0)
OPIATES UR-MCNC: NEGATIVE — SIGNIFICANT CHANGE UP
OXYCODONE UR-MCNC: NEGATIVE — SIGNIFICANT CHANGE UP
PCP SPEC-MCNC: SIGNIFICANT CHANGE UP
PCP UR-MCNC: NEGATIVE — SIGNIFICANT CHANGE UP
PH UR: 6.5 — SIGNIFICANT CHANGE UP (ref 5–8)
PLATELET # BLD AUTO: 276 K/UL — SIGNIFICANT CHANGE UP (ref 150–400)
POTASSIUM SERPL-MCNC: 3.4 MMOL/L — LOW (ref 3.5–5.3)
POTASSIUM SERPL-SCNC: 3.4 MMOL/L — LOW (ref 3.5–5.3)
PROT SERPL-MCNC: 7 G/DL — SIGNIFICANT CHANGE UP (ref 6–8.3)
PROT UR-MCNC: 100 MG/DL
RBC # BLD: 4.87 M/UL — SIGNIFICANT CHANGE UP (ref 3.8–5.2)
RBC # FLD: 14.6 % — HIGH (ref 10.3–14.5)
RBC CASTS # UR COMP ASSIST: 179 /HPF — HIGH (ref 0–4)
SALICYLATES SERPL-MCNC: <0.3 MG/DL — LOW (ref 15–30)
SARS-COV-2 RNA SPEC QL NAA+PROBE: SIGNIFICANT CHANGE UP
SODIUM SERPL-SCNC: 140 MMOL/L — SIGNIFICANT CHANGE UP (ref 135–145)
SP GR SPEC: 1.02 — SIGNIFICANT CHANGE UP (ref 1–1.03)
SQUAMOUS # UR AUTO: 5 /HPF — SIGNIFICANT CHANGE UP (ref 0–5)
THC UR QL: POSITIVE
TOXICOLOGY SCREEN, DRUGS OF ABUSE, SERUM RESULT: SIGNIFICANT CHANGE UP
TSH SERPL-MCNC: 1.75 UIU/ML — SIGNIFICANT CHANGE UP (ref 0.27–4.2)
UROBILINOGEN FLD QL: 1 MG/DL — SIGNIFICANT CHANGE UP (ref 0.2–1)
WBC # BLD: 7.26 K/UL — SIGNIFICANT CHANGE UP (ref 3.8–10.5)
WBC # FLD AUTO: 7.26 K/UL — SIGNIFICANT CHANGE UP (ref 3.8–10.5)
WBC UR QL: 8 /HPF — HIGH (ref 0–5)

## 2023-10-05 PROCEDURE — 99285 EMERGENCY DEPT VISIT HI MDM: CPT

## 2023-10-05 RX ORDER — HALOPERIDOL DECANOATE 100 MG/ML
5 INJECTION INTRAMUSCULAR ONCE
Refills: 0 | Status: DISCONTINUED | OUTPATIENT
Start: 2023-10-05 | End: 2023-11-03

## 2023-10-05 RX ORDER — HALOPERIDOL DECANOATE 100 MG/ML
5 INJECTION INTRAMUSCULAR ONCE
Refills: 0 | Status: COMPLETED | OUTPATIENT
Start: 2023-10-05 | End: 2023-10-05

## 2023-10-05 RX ADMIN — HALOPERIDOL DECANOATE 5 MILLIGRAM(S): 100 INJECTION INTRAMUSCULAR at 19:08

## 2023-10-05 RX ADMIN — Medication 2 MILLIGRAM(S): at 19:08

## 2023-10-05 NOTE — ED ADULT NURSE NOTE - CHIEF COMPLAINT QUOTE
Pt presents for psych evaluation. Pt was found arguing with parents on the street. Pt tearful and anxious in triage.  Ani(mom)-[phone]  Roseline(dad)-[phone]
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
## 2023-10-05 NOTE — ED BEHAVIORAL HEALTH ASSESSMENT NOTE - RISK ASSESSMENT
pt is at elevated risk for self harm due to mood dysregulation , insomnia   protective factors : supportive network, in school, no hx of sa, no family hx of sa  immediate risk factors: mood dysregulation, insomnia , psychosis will be mitigated by inpatient hospitalization    ON RE-EVALUATION (Dr Martinez): the Pt is currently NOT in acute imminent risk of self harm as well as also NOT being at chronically elevated risk of self-harm.  currently, there is no identifiable acute increase in risk that  would be mitigated by an involuntary psychiatric admission. Pt recanted her request for a voluntary admission to in-Pt unit.      at this time, THE PATIENT IS NOT DEEMED AN IMMINENT THREAT TO SELF OR TO OTHERS IN THE COMMUNITY. pt is at elevated risk for harm due to mood dysregulation  psychosis   protective factors : supportive network, in school, no hx of sa, no family hx of sa

## 2023-10-05 NOTE — ED BEHAVIORAL HEALTH ASSESSMENT NOTE - NSBHMSEBEHAV_PSY_A_CORE
on re-evaluation, she continues to be cooperative/ not suspicious/ not paranoid/ not guarded/ not evasive/Cooperative Cooperative superficially cooperative, guarded, suspicious

## 2023-10-05 NOTE — BH PATIENT PROFILE - NSPROPASSIVESMOKEEXPOSURE_GEN_A_NUR
Unknown Consent (Scalp)/Introductory Paragraph: The rationale for Mohs was explained to the patient and consent was obtained. The risks, benefits and alternatives to therapy were discussed in detail. Specifically, the risks of changes in hair growth pattern secondary to repair, infection, scarring, bleeding, prolonged wound healing, incomplete removal, allergy to anesthesia, nerve injury and recurrence were addressed. Prior to the procedure, the treatment site was clearly identified and confirmed by the patient. All components of Universal Protocol/PAUSE Rule completed.

## 2023-10-05 NOTE — ED BEHAVIORAL HEALTH ASSESSMENT NOTE - NSBHATTESTCOMMENTATTENDFT_PSY_A_CORE
23/F college student with recent hx of depression - of which was started on prozac; no reported hx of psych admissions; denied any SA nor NSSIB. whilst she denied using any substances - her last Tox screen, 10/2/2023 - yielded + for THC. Pt known to writer as seen last 10/2/2023 as a hand over from overnight shift psych ED attending for evaluation of psychosis - which was thought of possibly substance induced psychosis.  Pt rescinded her initial request for voluntary admission at that time.  ED service did not have enough justification to pursue an involuntary psych admission.  Pt was apparently discharged during that encounter and encouraged to have early follow up with her community based [facility].  Today, presented to the ED BIB EMS for a psych evaluation due to agitation.      at this time, the Pt is manifesting persistent signs/ symptoms of psychosis - which cannot be attributed to purely substance induced alone (latest tox screen done today yielding + THC). suspected underlying primary psychotic disorder which has been unmasked with the help of cannabis. currently, presenting with disorganization in her TP; is severely affectively dysregulated (lability is driven by psychosis; and NOT primarily affective); delusional; experiencing AH; with poor insight and impaired judgement. ongoing symptoms causing severe functional impairment.   Pt will benefit from psych admission aimed at stabilization and ensuring safety

## 2023-10-05 NOTE — ED BEHAVIORAL HEALTH ASSESSMENT NOTE - DESCRIPTION
pt is labile, tearful,  at times smiling   Vital Signs Last 24 Hrs  T(C): 36.5 (02 Oct 2023 04:33), Max: 36.9 (02 Oct 2023 00:41)  T(F): 97.7 (02 Oct 2023 04:33), Max: 98.5 (02 Oct 2023 00:41)  HR: 70 (02 Oct 2023 04:33) (70 - 100)  BP: 113/81 (02 Oct 2023 04:33) (113/81 - 147/91)  BP(mean): 91 (02 Oct 2023 04:33) (91 - 91)  RR: 16 (02 Oct 2023 04:33) (16 - 18)  SpO2: 100% (02 Oct 2023 04:33) (100% - 100%)    Parameters below as of 02 Oct 2023 04:33  Patient On (Oxygen Delivery Method): room air    ON RE-EVALUATION (Dr Martinez, 10/2/2023 at 9:30AM):  There is no reported agitation/aggressive behavior.  No verbalization of active/ passive SI/HI.   There are no signs/symptoms of acute beck or florid psychosis.  she is not psychomotorically retarded. whilst anxious, nothing suggestive that she is in severe anxiety.  Pt is not showing any signs/symptoms of impending withdrawal.  she is not delirious.. Pt has not tested limits.. Has maintained appropriate boundaries. Pt has been easily redirected.  Overall, there has been no management issues. none single, college student Patient became agitated, slamming doors and stating staff gave her herpes. She was unable to be deescalated. She required IM haldol 5mg/Ativan 2mg at 18:58.    ICU Vital Signs Last 24 Hrs  T(C): 36.8 (05 Oct 2023 17:58), Max: 36.8 (05 Oct 2023 17:58)  T(F): 98.2 (05 Oct 2023 17:58), Max: 98.2 (05 Oct 2023 17:58)  HR: 53 (05 Oct 2023 17:58) (53 - 110)  BP: 134/72 (05 Oct 2023 17:58) (134/72 - 136/94)  BP(mean): --  ABP: --  ABP(mean): --  RR: 17 (05 Oct 2023 17:58) (16 - 17)  SpO2: 100% (05 Oct 2023 17:58) (99% - 100%)    O2 Parameters below as of 05 Oct 2023 17:58  Patient On (Oxygen Delivery Method): room air

## 2023-10-05 NOTE — ED BEHAVIORAL HEALTH ASSESSMENT NOTE - PSYCHIATRIC ISSUES AND PLAN (INCLUDE STANDING AND PRN MEDICATION)
defer standing meds to primary team. for agitation: ativan 2mg po/im q6hrs prn defer standing meds to primary team. for agitation: ativan 2mg po/im q6hrs prn haldol 5mg IM PRN. emailed first episode research team

## 2023-10-05 NOTE — ED BEHAVIORAL HEALTH ASSESSMENT NOTE - NS ED BHA PLAN TR REFERRAL APPT DISCUSSED2 FT
encouraged to follow up with Dr Mueller this week. as back up, Pt was given resource to the community like [facility]

## 2023-10-05 NOTE — ED BEHAVIORAL HEALTH ASSESSMENT NOTE - NSPRESENTSXS_PSY_ALL_CORE
Depressed mood/Anhedonia/Psychosis/Global insomnia/Severe anxiety, agitation or panic Depressed mood/Anhedonia/Psychosis/Impulsivity/Severe anxiety, agitation or panic

## 2023-10-05 NOTE — ED BEHAVIORAL HEALTH ASSESSMENT NOTE - NS ED BHA PLAN TR REFERRANT YES2 FT
extensively discussed with Pt, mother and 2 maternal aunts re: ongoing clinical course - the importance of compliance with meds and appts with Dr Mueller. Pt's family DID NOT EXPRESS ANY SAFETY CONCERNS FOT THIS PATIENT ENOUGH FOR  ED SERVICE TO PURSUE INVOLUNTARY PSYCH ADMISSION. in addition, discussed recommendations with Dr DESTINY Le (ED team)

## 2023-10-05 NOTE — ED BEHAVIORAL HEALTH ASSESSMENT NOTE - NSBHATTESTAPPAMEND_PSY_A_CORE
I have personally seen and examined this patient. I fully participated in the care of this patient. I have made amendments to the documentation where appropriate and otherwise agree with the history, physical exam, and plan as documented by the EPHRAIM
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
## 2023-10-05 NOTE — ED BEHAVIORAL HEALTH ASSESSMENT NOTE - PRIOR MEDICATION SIDE EFFECTS OR ADVERSE REACTIONS
Lantus, Humalog, Mag-Ox 400, Colace, Zofran, Hydrochlorothiazide, Antivert, Lipitor, Protonix
None known

## 2023-10-05 NOTE — ED BEHAVIORAL HEALTH ASSESSMENT NOTE - GENERAL APPEARANCE
on re-evaluation, she DOES NOT APPEAR TO BE INTERNALLY STIMULATED/No deformities present No deformities present

## 2023-10-05 NOTE — ED BEHAVIORAL HEALTH ASSESSMENT NOTE - NSACTIVEVENT_PSY_ALL_CORE
Triggering events leading to humiliation, shame, and/or despair (e.g., Loss of relationship, financial or health status) (real or anticipated)/Substance intoxication or withdrawal Triggering events leading to humiliation, shame, and/or despair (e.g., Loss of relationship, financial or health status) (real or anticipated)

## 2023-10-05 NOTE — ED BEHAVIORAL HEALTH ASSESSMENT NOTE - NSBHMSEPERCEPT_PSY_A_CORE
on re-evaluation, she DENIED experiencing any ongoing perceptual disturbances/Auditory hallucinations Auditory hallucinations

## 2023-10-05 NOTE — ED ADULT NURSE NOTE - OBJECTIVE STATEMENT
Pt attend [employer]. As per EMS Pt hearing voices, Pt denies and says she is hearing people speak. Pt appears distraught, hair not brushed, insomnia x2 weeks and having trouble with school work. Pt calm and cooperative. Pt denies; ETOH/drug use, S/I, H/I, CAH, V/H, paranoia/anxiety.

## 2023-10-05 NOTE — ED BEHAVIORAL HEALTH ASSESSMENT NOTE - NS ED BHA ED COURSE PSYCHIATRIC MEDICATION GIVEN
Oral Medication (indication, name, dose, time) Involuntary Intramuscular (indication, name, dose, time)

## 2023-10-05 NOTE — ED ADULT TRIAGE NOTE - CHIEF COMPLAINT QUOTE
Pt presents for psych evaluation. Pt was found arguing with parents on the street. Pt tearful and anxious in triage. Pt presents for psych evaluation. Pt was found arguing with parents on the street. Pt tearful and anxious in triage.  Ani(mom)-[phone]  Roseline(dad)-[phone]

## 2023-10-05 NOTE — ED BEHAVIORAL HEALTH ASSESSMENT NOTE - OTHER PAST PSYCHIATRIC HISTORY (INCLUDE DETAILS REGARDING ONSET, COURSE OF ILLNESS, INPATIENT/OUTPATIENT TREATMENT)
as per hpi with recent hx of depression started treatment approximately 1 month ago with Dr. Mueller, prescribed Prozac 20mg , no hx of sa , no hx of NSSIB.

## 2023-10-05 NOTE — ED BEHAVIORAL HEALTH NOTE - BEHAVIORAL HEALTH NOTE
COVID Exposure Screen- Patient    1. *Have you had a COVID-19 test in the last 90 days? ( x) Yes ( ) No ( ) Unknown- Reason: _____    IF YES PROCEED TO QUESTION #2. IF NO OR UNKNOWN, PLEASE SKIP TO QUESTION #3.    Date of test(s) and result(s):10/2 negative    2. *In the past 10 days, have you been around anyone with a positive COVID-19 test?* ( ) Yes (x ) No ( ) Unknown- Reason: ____

## 2023-10-05 NOTE — ED BEHAVIORAL HEALTH ASSESSMENT NOTE - VIOLENCE RISK FACTORS:
None Known Affective dysregulation/Impulsivity/Lack of insight into violence risk/need for treatment/Irritability/None Known

## 2023-10-05 NOTE — ED BEHAVIORAL HEALTH ASSESSMENT NOTE - NSBHMSETHTCONTENT_PSY_A_CORE
on re-evaluation, no delusions elicited. no active or passive SI or HI/Delusions/Ruminations Delusions/Ideas of reference

## 2023-10-05 NOTE — ED ADULT NURSE NOTE - NSFALLUNIVINTERV_ED_ALL_ED
Bed/Stretcher in lowest position, wheels locked, appropriate side rails in place/Call bell, personal items and telephone in reach/Instruct patient to call for assistance before getting out of bed/chair/stretcher/Non-slip footwear applied when patient is off stretcher/[place] to call system/Physically safe environment - no spills, clutter or unnecessary equipment/Purposeful proactive rounding/Room/bathroom lighting operational, light cord in reach

## 2023-10-05 NOTE — ED BEHAVIORAL HEALTH ASSESSMENT NOTE - NSBHMSEIMPULSE_PSY_A_CORE
This note was copied from a baby's chart.  Called to bedside to assist w/waking sleepy baby & latch assist.  Infant currently being assessed & is crying.  Unable to hand express drops from left breast & medium drop expressed from right side.  Mom has low supply risk factors of AMA & hx of low supply w/2nd baby.  She says she  her 2nd baby x3wks.  They had trouble in the hospital & supplemented then changed to similac within the first month.  She was pumping but the most she was able to pump was 1/2oz.    Worked with infant latch in [employer].  Latches & sucks 3-5 times before falling asleep.  Parents state infant's last good feed was around 0500 & feedings since then have been 5 mins or less.  Discussed infant possibly burning more calories at the breast than is taking in & causing extreme sleepiness.  24mm nipple shield given & educated parents on use/risks.  Infant latched & sucked for a little longer w/shield but still very sleepy & would not feed for longer than a few minutes.  Discussed option of pre-filling nipple shield with formula to provide small supplement to help infant feed better & promote longer feeding.  Parents decline at this time.  Given syringe w/blunt tip & instructed on filling shield if they desire at later feeding.    Set up on [facility], all supplies & education given for insurance pumping & syringing any EBM to baby.    Education provided:  Use & cleaning of pump; frequency/duration of pumping; milk collection, storage/safe milk handling & labeling; breast massage; & hand expression.  Verbalized understanding.     Mother denies questions/concerns at this time.  Encouraged to call for help if needed.       Normal Impaired

## 2023-10-05 NOTE — ED BEHAVIORAL HEALTH NOTE - BEHAVIORAL HEALTH NOTE
As per request of provider, Writer contacted pt’s Ani(mom)-[phone] to obtain collateral information. the following information is per the mother.    Patient is a 24 Yo female domiciled w/ mother and father, 2 siblings (10 yo and 16 yo , no safety concerns) senior at [employer], hx of depression? [facility] activated by police.     Reason for ED visit:  Pt had class online today and said she didn’t feel good. Mom tried to encourage her to go to the doctor but pt refused and said she wanted to go to college to get her belongings. Pt and family was arguing outside of the home and police passed by. police recommended the pt come to the ED for evaluation.    Symptoms/Hx:  Sunday the pt’s college called the aunt to advise her that she was going to the hospital for psychiatric reasons near [facility]. Mother said pt did not sleep for 4 days and was tearful. Mother says she went up to the hospital and agreed to take her closer to home to go to a local hospital for admission. Pt was seen at [facility] 3 days before. Mother reports since being home for 3 days she has been sleeping eating and showering at baseline. Mother says pt does appear more depression and talks about her friend. When writer inquired about the friend she said to ask the pt. mother reports the pt did report not feeling well today. She is unaware of any AVH, paranoia or delusions. Mother unaware of any Si or hi and is unaware of any past suicide attempts.     Baseline:  mother says at baseline she cares for herself and presents more happy.    Drug/alcohol use: no drug or alcohol use.    Stressors: mother says pt mentions a friend but she did not elaborate.    Treatment team: unsure about past psych hx, mother believes she has a doctor who prescribes Prozac. She says she is not involved in her care.    medical problems:  asthma.    Medication:  hx of treatment on Prozac as per mother.     Violence/aggression: not violent or aggressive. She says pt has no access to firearms or weapons.    Family Hx:  no family hx reported.    Dispo:  mother has no further safety concerns and is comfortable with pt returning home. As per request of provider, Writer contacted pt’s Ani(mom)-[phone] to obtain collateral information. the following information is per the mother.    Patient is a 22 Yo female domiciled w/ mother and father, 2 siblings (10 yo and 16 yo , no safety concerns) senior at [employer], hx of depression? [facility] activated by police.     Reason for ED visit:  Pt had class online today and said she didn’t feel good. Mom tried to encourage her to go to the doctor but pt refused and said she wanted to go to college to get her belongings. Pt and family was arguing outside of the home and police passed by. police recommended the pt come to the ED for evaluation.    Symptoms/Hx:  Sunday the pt’s college called the aunt to advise her that she was going to the hospital for psychiatric reasons near [facility]. Mother said pt did not sleep for 4 days and was tearful. Mother says she went up to the hospital and agreed to take her closer to home to go to a local hospital for admission. Pt was seen at [facility] 3 days before. Mother reports since being home for 3 days she has been sleeping eating and showering at baseline. Mother says pt does appear more depression and talks about her friend. When writer inquired about the friend she said to ask the pt. mother reports the pt did report not feeling well today. She is unaware of any AVH, paranoia or delusions. Mother unaware of any Si or hi and is unaware of any past suicide attempts.     Baseline:  mother says at baseline she cares for herself and presents more happy.    Drug/alcohol use: no drug or alcohol use.    Stressors: mother says pt mentions a friend but she did not elaborate.    Treatment team: unsure about past psych hx, mother believes she has a doctor who prescribes Prozac. She says she is not involved in her care.    medical problems:  asthma.    Medication:  hx of treatment on Prozac as per mother.     Violence/aggression: not violent or aggressive. She says pt has no access to firearms or weapons.    Family Hx:  no family hx reported.    Dispo:  mother has no further safety concerns and is comfortable with pt returning home.    Writer informed mother of pt's admission to [facility].

## 2023-10-05 NOTE — ED BEHAVIORAL HEALTH ASSESSMENT NOTE - NSBHMSETHTPROC_PSY_A_CORE
on re-evaluation,  THERE IS NO DISORGANIZATION IN THOUGHT PROCESS/Thought blocking Linear/Thought blocking Circumstantial/Thought blocking/Impaired reasoning Circumstantial/Thought blocking/Illogical/Impaired reasoning

## 2023-10-05 NOTE — ED PROVIDER NOTE - CLINICAL SUMMARY MEDICAL DECISION MAKING FREE TEXT BOX
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 There is no clinical evidence of intoxication or any acute medical problem requiring immediate intervention. Patient is awaiting psychiatric consultation. Final disposition will be determined by psychiatrist.

## 2023-10-05 NOTE — ED BEHAVIORAL HEALTH ASSESSMENT NOTE - SOURCE OF INFORMATION
Patient Transfer Information  Patient connected to monitoring equipment on arrival: yes Vital signs monitor     Patient connected to wall oxygen on arrival: N/A    Belongings: Sent home with family    Safety check completed: Yes    Patient/Mother

## 2023-10-05 NOTE — ED PROVIDER NOTE - OBJECTIVE STATEMENT
24 y/o F, senior at [employer] secondary to aggression and bizarre behaviour at home.  Admits that she hears voices but her parents do not believe her.   Appears paranoid and internally preoccupied.   Denies SI/HI/VH. Denies pain, SOB, fever chills, chest/abdominal discomfort.  Denies falling, punching or kicking any object. No evidence of physical injuries, broken skin or deformities. Denies use of alochol or illicit drugs.

## 2023-10-05 NOTE — ED ADULT NURSE NOTE - NS ED NURSE PATIENT LEFT UNIT TIME
22:31 Carac Counseling:  I discussed with the patient the risks of Carac including but not limited to erythema, scaling, itching, weeping, crusting, and pain.

## 2023-10-06 PROCEDURE — 99223 1ST HOSP IP/OBS HIGH 75: CPT

## 2023-10-06 PROCEDURE — 90853 GROUP PSYCHOTHERAPY: CPT

## 2023-10-06 RX ORDER — ARIPIPRAZOLE 15 MG/1
5 TABLET ORAL AT BEDTIME
Refills: 0 | Status: DISCONTINUED | OUTPATIENT
Start: 2023-10-07 | End: 2023-10-09

## 2023-10-06 RX ORDER — ARIPIPRAZOLE 15 MG/1
2 TABLET ORAL AT BEDTIME
Refills: 0 | Status: COMPLETED | OUTPATIENT
Start: 2023-10-06 | End: 2023-10-06

## 2023-10-06 RX ORDER — CLONAZEPAM 1 MG
1 TABLET ORAL AT BEDTIME
Refills: 0 | Status: DISCONTINUED | OUTPATIENT
Start: 2023-10-06 | End: 2023-10-11

## 2023-10-06 RX ORDER — DIPHENHYDRAMINE HCL 50 MG
50 CAPSULE ORAL EVERY 6 HOURS
Refills: 0 | Status: DISCONTINUED | OUTPATIENT
Start: 2023-10-06 | End: 2023-11-03

## 2023-10-06 RX ORDER — NICOTINE POLACRILEX 2 MG
2 GUM BUCCAL
Refills: 0 | Status: DISCONTINUED | OUTPATIENT
Start: 2023-10-06 | End: 2023-11-03

## 2023-10-06 RX ADMIN — Medication 1 MILLIGRAM(S): at 21:50

## 2023-10-06 RX ADMIN — Medication 2 MILLIGRAM(S): at 13:13

## 2023-10-06 RX ADMIN — ARIPIPRAZOLE 2 MILLIGRAM(S): 15 TABLET ORAL at 21:50

## 2023-10-06 RX ADMIN — Medication 2 MILLIGRAM(S): at 21:51

## 2023-10-06 NOTE — BH INPATIENT PSYCHIATRY ASSESSMENT NOTE - CURRENT MEDICATION
MEDICATIONS  (STANDING):    MEDICATIONS  (PRN):  haloperidol    Injectable 5 milliGRAM(s) IntraMuscular Once PRN extreme agitation secondary to psychosis  LORazepam     Tablet 2 milliGRAM(s) Oral every 6 hours PRN Anxiety  LORazepam   Injectable 2 milliGRAM(s) IntraMuscular Once PRN extreme anxiety  nicotine  Polacrilex Gum 2 milliGRAM(s) Oral every 2 hours PRN cravings   MEDICATIONS  (STANDING):  ARIPiprazole 2 milliGRAM(s) Oral at bedtime  clonazePAM  Tablet 1 milliGRAM(s) Oral at bedtime    MEDICATIONS  (PRN):  diphenhydrAMINE 50 milliGRAM(s) Oral every 6 hours PRN Agitation  haloperidol    Injectable 5 milliGRAM(s) IntraMuscular Once PRN extreme agitation secondary to psychosis  LORazepam     Tablet 1 milliGRAM(s) Oral every 6 hours PRN anxiety  LORazepam     Tablet 2 milliGRAM(s) Oral every 6 hours PRN agitation  LORazepam   Injectable 2 milliGRAM(s) IntraMuscular Once PRN extreme anxiety  nicotine  Polacrilex Gum 2 milliGRAM(s) Oral every 2 hours PRN cravings

## 2023-10-06 NOTE — BH INPATIENT PSYCHIATRY ASSESSMENT NOTE - RISK ASSESSMENT
Patient low acute risk for suicide or violence, though with poor and worsening self care and passive SI in context of untreated psychosis.
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
## 2023-10-06 NOTE — PSYCHIATRIC REHAB INITIAL EVALUATION - NSBHPRRECOMMEND_PSY_ALL_CORE
Writer met with patient in order to orient patient to unit, provide patient with a schedule, and introduce patient to psychiatric staff and department functions. Patient was verbal, polite, and cooperative with personal information. Writer collaborated with patient to select an appropriate psychiatric rehabilitation goal. Psychiatric rehabilitation staff will continue to engage patient daily in order to develop therapeutic rapport.

## 2023-10-06 NOTE — BH INPATIENT PSYCHIATRY ASSESSMENT NOTE - NSBHMETABOLIC_PSY_ALL_CORE_FT
BMI: BMI (kg/m2): 21.3 (10-05-23 @ 23:14)  HbA1c:   Glucose:   BP: 103/69 (10-06-23 @ 07:41) (103/69 - 136/94)  Lipid Panel:

## 2023-10-06 NOTE — BH INPATIENT PSYCHIATRY ASSESSMENT NOTE - NSBHCHARTREVIEWVS_PSY_A_CORE FT
Vital Signs Last 24 Hrs  T(C): 36.1 (10-06-23 @ 07:41), Max: 36.8 (10-05-23 @ 17:58)  T(F): 97 (10-06-23 @ 07:41), Max: 98.2 (10-05-23 @ 17:58)  HR: 67 (10-06-23 @ 07:41) (53 - 110)  BP: 103/69 (10-06-23 @ 07:41) (103/69 - 136/94)  BP(mean): --  RR: 17 (10-06-23 @ 07:41) (16 - 17)  SpO2: 100% (10-05-23 @ 23:14) (99% - 100%)    Orthostatic VS  10-05-23 @ 23:14  Lying BP: --/-- HR: --  Sitting BP: 101/67 HR: 79  Standing BP: 111/67 HR: 99  Site: --  Mode: --   Vital Signs Last 24 Hrs  T(C): 36.1 (10-06-23 @ 07:41), Max: 36.8 (10-05-23 @ 17:58)  T(F): 97 (10-06-23 @ 07:41), Max: 98.2 (10-05-23 @ 17:58)  HR: 67 (10-06-23 @ 07:41) (53 - 67)  BP: 103/69 (10-06-23 @ 07:41) (103/69 - 134/72)  BP(mean): --  RR: 17 (10-06-23 @ 07:41) (16 - 17)  SpO2: 100% (10-05-23 @ 23:14) (100% - 100%)    Orthostatic VS  10-05-23 @ 23:14  Lying BP: --/-- HR: --  Sitting BP: 101/67 HR: 79  Standing BP: 111/67 HR: 99  Site: --  Mode: --
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
## 2023-10-06 NOTE — BH INPATIENT PSYCHIATRY ASSESSMENT NOTE - MSE UNSTRUCTURED FT
On exam, patient is poorly groomed with fair hygiene. Appropriate eye contact, cooperative with interview.  Speech:  normal productivity, volume, rate, fluency.    Normal gait/station.   Motor: no PMA/PMR evident.   Mood is "anxious"   Affect: blunted, mood congruent.   Thought process: patient mostly linear, goal directed on assessment.   Thought content; Considerable and varied IOR, not yet crystalized into stable delusions, +passive SI, denies SI/HI/VI, denies thoughts to self-injure.   Perceptual; +AH (hearing a friend through the vents), no other sensory disturbances elicited.    Cognition: AAOx3  Attention/concentration: distractible   Insight: limited  Judgement: fair.   Impulse control:  intact at this time.

## 2023-10-06 NOTE — BH INPATIENT PSYCHIATRY ASSESSMENT NOTE - VIOLENCE RISK FACTORS:
Affective dysregulation/Impulsivity/Lack of insight into violence risk/need for treatment/Irritability/None Known

## 2023-10-06 NOTE — BH INPATIENT PSYCHIATRY ASSESSMENT NOTE - NSBHASSESSSUMMFT_PSY_ALL_CORE
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 Mueller (a psychiatrist for [employer] to which father belongs), prescribed Prozac 20mg, with some restlessness she noted after taking it vs feeling "sped up", no hx of sa , remote hx of NSSIB early in high-school but none recent, presented to the [facility] ED 2x in one week at the behest of her parents for 2 weeks of acute behavioral change, anxiety, disorganized/difficult to follow speech, poor sleep and passive SI.    On Admit to 1S patient recounts hx consistent with new onset psychotic illness with some decline in function x6mo likely prodromal and onset of acute positive sx (AH, IOR, though seemingly not yet crystalized delusions) in context of recently starting Prozac for depressive and anxious sx (also likely to have been prodromal sx). At this time patient not appearing with manic presentation though will seek further dx clarity with ongoing observation. Of note patient with some BPD traits most prominent when she was in high-school (cutting, abandonment fears), though BPD does not account for current presentation.     Plan:   -admit to 1S on 9.39, patient with acute untreated psychosis, poor and worsening self care, requires inpatient hospitalization for stabilization and safety.   -Appropriate for routine checks, not expected to pose danger to self or others in controlled inpatient setting.   -Psychiatric:   Will start risperidone 1mg HS tonight, discussed risk benefit with patient including but not limited to NMS, TD, prolactin elevation, WG, metabolic disturbances, patient amenable to start tx. Will titrate as tolerated.     PRNS: Haldol 5mg, Ativan 2mg, Benadryl 50mg PO/IM PRN for agitation  I/G/M therapy  -Medical: given presentation will likely order for CT scan if not previously done, FEP labs, Lipids, A1C baselines.   EKG, Utox (+ for THC), UA largely unremarkable but with ketones likes 2/2 poor PO intake, CBC is unremarkable, CMP mild hypokalemia to 3.4, otherwise WNL, TSH is normal, HCG is negative.      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 Mueller (a psychiatrist for [employer] to which father belongs), prescribed Prozac 20mg, with some restlessness she noted after taking it vs feeling "sped up", no hx of sa , remote hx of NSSIB early in high-school but none recent, presented to the [facility] ED 2x in one week at the behest of her parents for 2 weeks of acute behavioral change, anxiety, disorganized/difficult to follow speech, poor sleep and passive SI.    On Admit to 1S patient recounts hx consistent with new onset psychotic illness with some decline in function x6mo likely prodromal and onset of acute positive sx (AH, IOR, though seemingly not yet crystalized delusions) in context of recently starting Prozac for depressive and anxious sx (also likely to have been prodromal sx). At this time patient not appearing with manic presentation though will seek further dx clarity with ongoing observation. Of note patient with some BPD traits most prominent when she was in high-school (cutting, abandonment fears), though BPD does not account for current presentation.     Plan:   -admit to 1S on 9.39, patient with acute untreated psychosis, poor and worsening self care, requires inpatient hospitalization for stabilization and safety.   -Appropriate for routine checks, not expected to pose danger to self or others in controlled inpatient setting.   -Psychiatric:   Will start risperidone 1mg HS tonight, discussed risk benefit with patient including but not limited to NMS, TD, prolactin elevation, WG, metabolic disturbances, patient amenable to start tx. Will titrate as tolerated.     PRNS: Haldol 5mg, Ativan 2mg, Benadryl 50mg PO/IM PRN for agitation  I/G/M therapy  pt vapes nicotine, ordered for 2mg nicotine gum q2h PRN   -Medical: head CT reviewed and normal, ordered for b12, folate, ceruloplasmin, kimberlee, esr, RPR, HIV serologies, Lipids, A1C baselines.   EKG reviewed: rate of 60, NSR, QTC: 458  Utox (+ for THC), UA largely unremarkable but with ketones likes 2/2 poor PO intake, CBC is unremarkable, CMP mild hypokalemia to 3.4, otherwise WNL, TSH is normal, HCG is negative.      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 Mueller (a psychiatrist for [employer] to which father belongs), prescribed Prozac 20mg, with some restlessness she noted after taking it vs feeling "sped up", no hx of sa , remote hx of NSSIB early in high-school but none recent, presented to the [facility] ED 2x in one week at the behest of her parents for 2 weeks of acute behavioral change, anxiety, disorganized/difficult to follow speech, poor sleep and passive SI.    On Admit to 1S patient recounts hx consistent with new onset psychotic illness with some decline in function x6mo likely prodromal and onset of acute positive sx (AH, IOR, though seemingly not yet crystalized delusions) in context of recently starting Prozac for depressive and anxious sx (also likely to have been prodromal sx). At this time patient not appearing with manic presentation though will seek further dx clarity with ongoing observation. Of note patient with some BPD traits most prominent when she was in high-school (cutting, abandonment fears), though BPD does not account for current presentation.     Plan:   -admit to 1S on 9.39, patient with acute untreated psychosis, poor and worsening self care, requires inpatient hospitalization for stabilization and safety.   -Appropriate for routine checks, not expected to pose danger to self or others in controlled inpatient setting.   -Psychiatric:   Will start Abilify 2mg HS tonight and ordered to titrate to 5mg HS on 10/7, discussed risk benefit with patient including but not limited to NMS, TD, WG, metabolic disturbances, patient amenable to start tx. Will titrate as tolerated.   -Start standing Klonopin 1mg for insomnia  PRNS: Ativan 1mg for anxiety   Haldol 5mg, Ativan 2mg, Benadryl 50mg PO/IM PRN for agitation  I/G/M therapy  pt vapes nicotine, ordered for 2mg nicotine gum q2h PRN   -Medical: head CT reviewed and normal, ordered for b12, folate, ceruloplasmin, kimberlee, esr, RPR, HIV serologies, Lipids, A1C baselines.   EKG reviewed: rate of 60, NSR, QTC: 458  Utox (+ for THC), UA largely unremarkable but with ketones likes 2/2 poor PO intake, CBC is unremarkable, CMP mild hypokalemia to 3.4, otherwise WNL, TSH is normal, HCG is negative.      -Dispo:  pending stabilization
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
## 2023-10-07 PROCEDURE — 99232 SBSQ HOSP IP/OBS MODERATE 35: CPT

## 2023-10-07 RX ORDER — HALOPERIDOL DECANOATE 100 MG/ML
5 INJECTION INTRAMUSCULAR ONCE
Refills: 0 | Status: COMPLETED | OUTPATIENT
Start: 2023-10-07 | End: 2023-10-07

## 2023-10-07 RX ORDER — DIPHENHYDRAMINE HCL 50 MG
50 CAPSULE ORAL ONCE
Refills: 0 | Status: DISCONTINUED | OUTPATIENT
Start: 2023-10-07 | End: 2023-11-03

## 2023-10-07 RX ORDER — DIPHENHYDRAMINE HCL 50 MG
50 CAPSULE ORAL ONCE
Refills: 0 | Status: COMPLETED | OUTPATIENT
Start: 2023-10-07 | End: 2023-10-07

## 2023-10-07 RX ADMIN — Medication 50 MILLIGRAM(S): at 11:06

## 2023-10-07 RX ADMIN — Medication 1 MILLIGRAM(S): at 22:00

## 2023-10-07 RX ADMIN — HALOPERIDOL DECANOATE 5 MILLIGRAM(S): 100 INJECTION INTRAMUSCULAR at 11:06

## 2023-10-07 RX ADMIN — ARIPIPRAZOLE 5 MILLIGRAM(S): 15 TABLET ORAL at 22:00

## 2023-10-07 RX ADMIN — Medication 2 MILLIGRAM(S): at 11:06

## 2023-10-07 NOTE — BH INPATIENT PSYCHIATRY PROGRESS NOTE - NSBHASSESSSUMMFT_PSY_ALL_CORE
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 Mueller (a psychiatrist for [employer] union to which father belongs), prescribed Prozac 20mg, with some restlessness she noted after taking it vs feeling "sped up", no hx of sa , remote hx of NSSIB early in high-school but none recent, presented to the [facility] ED 2x in one week at the behest of her parents for 2 weeks of acute behavioral change, anxiety, disorganized/difficult to follow speech, poor sleep and passive SI.    On Admit to 1S patient recounts hx consistent with new onset psychotic illness with some decline in function x6mo likely prodromal and onset of acute positive sx (AH, IOR, though seemingly not yet crystalized delusions) in context of recently starting Prozac for depressive and anxious sx (also likely to have been prodromal sx). At this time patient not appearing with manic presentation though will seek further dx clarity with ongoing observation. Of note patient with some BPD traits most prominent when she was in high-school (cutting, abandonment fears), though BPD does not account for current presentation.     Plan:   -admit to 1S on 9.39, patient with acute untreated psychosis, poor and worsening self care, requires inpatient hospitalization for stabilization and safety.   -Appropriate for routine checks, not expected to pose danger to self or others in controlled inpatient setting.   -Psychiatric:   Will start Abilify 2mg HS tonight and ordered to titrate to 5mg HS on 10/7, discussed risk benefit with patient including but not limited to NMS, TD, WG, metabolic disturbances, patient amenable to start tx. Will titrate as tolerated.   -Start standing Klonopin 1mg for insomnia  PRNS: Ativan 1mg for anxiety   Haldol 5mg, Ativan 2mg, Benadryl 50mg PO/IM PRN for agitation  I/G/M therapy  pt vapes nicotine, ordered for 2mg nicotine gum q2h PRN   -Medical: head CT reviewed and normal, ordered for b12, folate, ceruloplasmin, kimberlee, esr, RPR, HIV serologies, Lipids, A1C baselines.   EKG reviewed: rate of 60, NSR, QTC: 458  Utox (+ for THC), UA largely unremarkable but with ketones likes 2/2 poor PO intake, CBC is unremarkable, CMP mild hypokalemia to 3.4, otherwise WNL, TSH is normal, HCG is negative.      -Dispo:  pending stabilization

## 2023-10-07 NOTE — BH INPATIENT PSYCHIATRY PROGRESS NOTE - CURRENT MEDICATION
MEDICATIONS  (STANDING):  ARIPiprazole 5 milliGRAM(s) Oral at bedtime  clonazePAM  Tablet 1 milliGRAM(s) Oral at bedtime    MEDICATIONS  (PRN):  diphenhydrAMINE 50 milliGRAM(s) Oral every 6 hours PRN Agitation  diphenhydrAMINE Injectable 50 milliGRAM(s) IntraMuscular once PRN rash/eps/agitation  haloperidol    Injectable 5 milliGRAM(s) IntraMuscular Once PRN extreme agitation secondary to psychosis  LORazepam     Tablet 1 milliGRAM(s) Oral every 6 hours PRN anxiety  LORazepam     Tablet 2 milliGRAM(s) Oral every 6 hours PRN agitation  LORazepam   Injectable 2 milliGRAM(s) IntraMuscular Once PRN extreme anxiety  nicotine  Polacrilex Gum 2 milliGRAM(s) Oral every 2 hours PRN cravings

## 2023-10-07 NOTE — BH INPATIENT PSYCHIATRY PROGRESS NOTE - PRN MEDS
MEDICATIONS  (PRN):  diphenhydrAMINE 50 milliGRAM(s) Oral every 6 hours PRN Agitation  diphenhydrAMINE Injectable 50 milliGRAM(s) IntraMuscular once PRN rash/eps/agitation  haloperidol    Injectable 5 milliGRAM(s) IntraMuscular Once PRN extreme agitation secondary to psychosis  LORazepam     Tablet 1 milliGRAM(s) Oral every 6 hours PRN anxiety  LORazepam     Tablet 2 milliGRAM(s) Oral every 6 hours PRN agitation  LORazepam   Injectable 2 milliGRAM(s) IntraMuscular Once PRN extreme anxiety  nicotine  Polacrilex Gum 2 milliGRAM(s) Oral every 2 hours PRN cravings
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
## 2023-10-08 PROCEDURE — 99232 SBSQ HOSP IP/OBS MODERATE 35: CPT

## 2023-10-08 RX ORDER — LANOLIN ALCOHOL/MO/W.PET/CERES
5 CREAM (GRAM) TOPICAL AT BEDTIME
Refills: 0 | Status: DISCONTINUED | OUTPATIENT
Start: 2023-10-08 | End: 2023-11-03

## 2023-10-08 RX ORDER — BENZTROPINE MESYLATE 1 MG
1 TABLET ORAL AT BEDTIME
Refills: 0 | Status: DISCONTINUED | OUTPATIENT
Start: 2023-10-08 | End: 2023-10-11

## 2023-10-08 RX ORDER — BENZTROPINE MESYLATE 1 MG
1 TABLET ORAL ONCE
Refills: 0 | Status: COMPLETED | OUTPATIENT
Start: 2023-10-08 | End: 2023-10-08

## 2023-10-08 RX ADMIN — Medication 1 MILLIGRAM(S): at 10:56

## 2023-10-08 RX ADMIN — ARIPIPRAZOLE 5 MILLIGRAM(S): 15 TABLET ORAL at 20:50

## 2023-10-08 RX ADMIN — Medication 5 MILLIGRAM(S): at 23:39

## 2023-10-08 RX ADMIN — Medication 1 MILLIGRAM(S): at 20:50

## 2023-10-08 RX ADMIN — Medication 2 MILLIGRAM(S): at 12:48

## 2023-10-08 NOTE — BH INPATIENT PSYCHIATRY PROGRESS NOTE - CURRENT MEDICATION
MEDICATIONS  (STANDING):  ARIPiprazole 5 milliGRAM(s) Oral at bedtime  benztropine 1 milliGRAM(s) Oral at bedtime  clonazePAM  Tablet 1 milliGRAM(s) Oral at bedtime    MEDICATIONS  (PRN):  diphenhydrAMINE 50 milliGRAM(s) Oral every 6 hours PRN Agitation  diphenhydrAMINE Injectable 50 milliGRAM(s) IntraMuscular once PRN rash/eps/agitation  haloperidol    Injectable 5 milliGRAM(s) IntraMuscular Once PRN extreme agitation secondary to psychosis  LORazepam     Tablet 1 milliGRAM(s) Oral every 6 hours PRN anxiety  LORazepam     Tablet 2 milliGRAM(s) Oral every 6 hours PRN agitation  LORazepam   Injectable 2 milliGRAM(s) IntraMuscular Once PRN extreme anxiety  nicotine  Polacrilex Gum 2 milliGRAM(s) Oral every 2 hours PRN cravings

## 2023-10-08 NOTE — BH INPATIENT PSYCHIATRY PROGRESS NOTE - NSBHASSESSSUMMFT_PSY_ALL_CORE
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 Mueller (a psychiatrist for [employer] union to which father belongs), prescribed Prozac 20mg, with some restlessness she noted after taking it vs feeling "sped up", no hx of sa , remote hx of NSSIB early in high-school but none recent, presented to the [facility] ED 2x in one week at the behest of her parents for 2 weeks of acute behavioral change, anxiety, disorganized/difficult to follow speech, poor sleep and passive SI.    On Admit to 1S patient recounts hx consistent with new onset psychotic illness with some decline in function x6mo likely prodromal and onset of acute positive sx (AH, IOR, though seemingly not yet crystalized delusions) in context of recently starting Prozac for depressive and anxious sx (also likely to have been prodromal sx). At this time patient not appearing with manic presentation though will seek further dx clarity with ongoing observation. Of note patient with some BPD traits most prominent when she was in high-school (cutting, abandonment fears), though BPD does not account for current presentation.     Plan:   -admit to 1S on 9.39, patient with acute untreated psychosis, poor and worsening self care, requires inpatient hospitalization for stabilization and safety.   -Appropriate for routine checks, not expected to pose danger to self or others in controlled inpatient setting.   -Psychiatric:   Will start Abilify 2mg HS tonight and ordered to titrate to 5mg HS on 10/7, discussed risk benefit with patient including but not limited to NMS, TD, WG, metabolic disturbances, patient amenable to start tx. Will titrate as tolerated. Add cogentin for dystonia   -Start standing Klonopin 1mg for insomnia  PRNS: Ativan 1mg for anxiety   Haldol 5mg, Ativan 2mg, Benadryl 50mg PO/IM PRN for agitation  I/G/M therapy  pt vapes nicotine, ordered for 2mg nicotine gum q2h PRN   -Medical: head CT reviewed and normal, ordered for b12, folate, ceruloplasmin, ikmberlee, esr, RPR, HIV serologies, Lipids, A1C baselines.   EKG reviewed: rate of 60, NSR, QTC: 458  Utox (+ for THC), UA largely unremarkable but with ketones likes 2/2 poor PO intake, CBC is unremarkable, CMP mild hypokalemia to 3.4, otherwise WNL, TSH is normal, HCG is negative.      -Dispo:  pending stabilization

## 2023-10-09 PROCEDURE — 99232 SBSQ HOSP IP/OBS MODERATE 35: CPT

## 2023-10-09 PROCEDURE — 90853 GROUP PSYCHOTHERAPY: CPT

## 2023-10-09 RX ORDER — FLUTICASONE PROPIONATE 50 MCG
1 SPRAY, SUSPENSION NASAL
Refills: 0 | Status: DISCONTINUED | OUTPATIENT
Start: 2023-10-09 | End: 2023-11-03

## 2023-10-09 RX ORDER — ARIPIPRAZOLE 15 MG/1
10 TABLET ORAL AT BEDTIME
Refills: 0 | Status: DISCONTINUED | OUTPATIENT
Start: 2023-10-09 | End: 2023-10-11

## 2023-10-09 RX ADMIN — ARIPIPRAZOLE 10 MILLIGRAM(S): 15 TABLET ORAL at 21:04

## 2023-10-09 RX ADMIN — Medication 1 MILLIGRAM(S): at 21:04

## 2023-10-09 RX ADMIN — Medication 5 MILLIGRAM(S): at 22:04

## 2023-10-09 NOTE — BH SOCIAL WORK INITIAL PSYCHOSOCIAL EVALUATION - NSCMSPTSTRENGTHS_PSY_ALL_CORE
Able to adapt/Compliance to treatment/Future/goal oriented/Highly motivated for treatment/Intact family/Intelligence/Interpersonal skills/Strong support system/Supportive family

## 2023-10-09 NOTE — BH INPATIENT PSYCHIATRY PROGRESS NOTE - NSBHASSESSSUMMFT_PSY_ALL_CORE
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 Mueller (a psychiatrist for [employer] union to which father belongs), prescribed Prozac 20mg, with some restlessness she noted after taking it vs feeling "sped up", no hx of sa , remote hx of NSSIB early in high-school but none recent, presented to the [facility] ED 2x in one week at the behest of her parents for 2 weeks of acute behavioral change, anxiety, disorganized/difficult to follow speech, poor sleep and passive SI.    On Admit to 1S patient recounts hx consistent with new onset psychotic illness with some decline in function x6mo likely prodromal and onset of acute positive sx (AH, IOR, though seemingly not yet crystalized delusions) in context of recently starting Prozac for depressive and anxious sx (also likely to have been prodromal sx). At this time patient not appearing with manic presentation though will seek further dx clarity with ongoing observation. Of note patient with some BPD traits most prominent when she was in high-school (cutting, abandonment fears), though BPD does not account for current presentation.     10/9: patient with some improvements in thought disorder, no overt AH at this time, though still quite referential and paranoid at times. Plan to increase Abilify to 10mg tonight. Was started on Cogentin 1mg after getting Haldol PRN over weekend as there was some concern for EPS though none apparent on exam today. Will order STI testing as per pt request.     Plan:   -admit to 1S on 9.39, patient with acute untreated psychosis, poor and worsening self care, requires inpatient hospitalization for stabilization and safety.   -Appropriate for routine checks, not expected to pose danger to self or others in controlled inpatient setting.   -Psychiatric:  -increase Abilify to 10mg from 5mg on 10/9  -1mg Klonopin for sleep   Add cogentin for dystonia   -Start standing Klonopin 1mg for insomnia  PRNS: Ativan 1mg for anxiety   Haldol 5mg, Ativan 2mg, Benadryl 50mg PO/IM PRN for agitation  I/G/M therapy  pt vapes nicotine, ordered for 2mg nicotine gum q2h PRN   -Medical: head CT reviewed and normal, ordered for b12, folate, ceruloplasmin, kimberlee, esr, RPR, HIV serologies, Lipids, A1C baselines.   EKG reviewed: rate of 60, NSR, QTC: 458  Utox (+ for THC), UA largely unremarkable but with ketones likes 2/2 poor PO intake, CBC is unremarkable, CMP mild hypokalemia to 3.4, otherwise WNL, TSH is normal, HCG is negative.      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 Mueller (a psychiatrist for [employer] union to which father belongs), prescribed Prozac 20mg, with some restlessness she noted after taking it vs feeling "sped up", no hx of sa , remote hx of NSSIB early in high-school but none recent, presented to the [facility] ED 2x in one week at the behest of her parents for 2 weeks of acute behavioral change, anxiety, disorganized/difficult to follow speech, poor sleep and passive SI.    On Admit to 1S patient recounts hx consistent with new onset psychotic illness with some decline in function x6mo likely prodromal and onset of acute positive sx (AH, IOR, though seemingly not yet crystalized delusions) in context of recently starting Prozac for depressive and anxious sx (also likely to have been prodromal sx). At this time patient not appearing with manic presentation though will seek further dx clarity with ongoing observation. Of note patient with some BPD traits most prominent when she was in high-school (cutting, abandonment fears), though BPD does not account for current presentation.     10/9: patient with some improvements in thought disorder, no overt AH at this time, though still quite referential and paranoid at times. Plan to increase Abilify to 10mg tonight. Was started on Cogentin 1mg after getting Haldol PRN over weekend as there was some concern for EPS though none apparent on exam today. Will order STI testing as per pt request.     Plan:   -admit to 1S on 9.39, patient with acute untreated psychosis, poor and worsening self care, requires inpatient hospitalization for stabilization and safety.   -Appropriate for routine checks, not expected to pose danger to self or others in controlled inpatient setting.   -Psychiatric:  -increase Abilify to 10mg from 5mg on 10/9  -1mg Klonopin for sleep   Add cogentin for dystonia   -Start standing Klonopin 1mg for insomnia  PRNS: Ativan 1mg for anxiety   Haldol 5mg, Ativan 2mg, Benadryl 50mg PO/IM PRN for agitation  I/G/M therapy  pt vapes nicotine, ordered for 2mg nicotine gum q2h PRN   -Medical: head CT reviewed and normal, reordered for b12, folate, ceruloplasmin, kimberlee, esr, RPR, HIV serologies, Lipids, A1C baselines as patient refused previously. Will order urine GC   EKG reviewed: rate of 60, NSR, QTC: 458  Utox (+ for THC), UA largely unremarkable but with ketones likes 2/2 poor PO intake, CBC is unremarkable, CMP mild hypokalemia to 3.4, otherwise WNL, TSH is normal, HCG is negative.      -Dispo:  pending stabilization

## 2023-10-09 NOTE — BH INPATIENT PSYCHIATRY PROGRESS NOTE - PRN MEDS
MEDICATIONS  (PRN):  diphenhydrAMINE 50 milliGRAM(s) Oral every 6 hours PRN Agitation  diphenhydrAMINE Injectable 50 milliGRAM(s) IntraMuscular once PRN rash/eps/agitation  fluticasone propionate 50 MICROgram(s)/spray Nasal Spray 1 Spray(s) Both Nostrils two times a day PRN nasal congestion  haloperidol    Injectable 5 milliGRAM(s) IntraMuscular Once PRN extreme agitation secondary to psychosis  LORazepam     Tablet 2 milliGRAM(s) Oral every 6 hours PRN agitation  LORazepam     Tablet 1 milliGRAM(s) Oral every 6 hours PRN anxiety  LORazepam   Injectable 2 milliGRAM(s) IntraMuscular Once PRN extreme anxiety  melatonin. 5 milliGRAM(s) Oral at bedtime PRN Insomnia  nicotine  Polacrilex Gum 2 milliGRAM(s) Oral every 2 hours PRN cravings   MEDICATIONS  (PRN):  diphenhydrAMINE 50 milliGRAM(s) Oral every 6 hours PRN Agitation  diphenhydrAMINE Injectable 50 milliGRAM(s) IntraMuscular once PRN rash/eps/agitation  fluticasone propionate 50 MICROgram(s)/spray Nasal Spray 1 Spray(s) Both Nostrils two times a day PRN nasal congestion  haloperidol    Injectable 5 milliGRAM(s) IntraMuscular Once PRN extreme agitation secondary to psychosis  LORazepam     Tablet 1 milliGRAM(s) Oral every 6 hours PRN anxiety  LORazepam     Tablet 2 milliGRAM(s) Oral every 6 hours PRN agitation  LORazepam   Injectable 2 milliGRAM(s) IntraMuscular Once PRN extreme anxiety  melatonin. 5 milliGRAM(s) Oral at bedtime PRN Insomnia  nicotine  Polacrilex Gum 2 milliGRAM(s) Oral every 2 hours PRN cravings

## 2023-10-09 NOTE — BH SOCIAL WORK INITIAL PSYCHOSOCIAL EVALUATION - OTHER PAST PSYCHIATRIC HISTORY (INCLUDE DETAILS REGARDING ONSET, COURSE OF ILLNESS, INPATIENT/OUTPATIENT TREATMENT)
At this time pt is meeting with writer for the first time, pt was reporting referential thoughts, suspicious of signing consent for team to speak with parents at this time. As per report pt has no prior psychiatric hx, this is his first psychiatric hospitalization.

## 2023-10-09 NOTE — BH INPATIENT PSYCHIATRY PROGRESS NOTE - CURRENT MEDICATION
MEDICATIONS  (STANDING):  ARIPiprazole 10 milliGRAM(s) Oral at bedtime  benztropine 1 milliGRAM(s) Oral at bedtime  clonazePAM  Tablet 1 milliGRAM(s) Oral at bedtime    MEDICATIONS  (PRN):  diphenhydrAMINE 50 milliGRAM(s) Oral every 6 hours PRN Agitation  diphenhydrAMINE Injectable 50 milliGRAM(s) IntraMuscular once PRN rash/eps/agitation  fluticasone propionate 50 MICROgram(s)/spray Nasal Spray 1 Spray(s) Both Nostrils two times a day PRN nasal congestion  haloperidol    Injectable 5 milliGRAM(s) IntraMuscular Once PRN extreme agitation secondary to psychosis  LORazepam     Tablet 2 milliGRAM(s) Oral every 6 hours PRN agitation  LORazepam     Tablet 1 milliGRAM(s) Oral every 6 hours PRN anxiety  LORazepam   Injectable 2 milliGRAM(s) IntraMuscular Once PRN extreme anxiety  melatonin. 5 milliGRAM(s) Oral at bedtime PRN Insomnia  nicotine  Polacrilex Gum 2 milliGRAM(s) Oral every 2 hours PRN cravings   MEDICATIONS  (STANDING):  ARIPiprazole 10 milliGRAM(s) Oral at bedtime  benztropine 1 milliGRAM(s) Oral at bedtime  clonazePAM  Tablet 1 milliGRAM(s) Oral at bedtime    MEDICATIONS  (PRN):  diphenhydrAMINE 50 milliGRAM(s) Oral every 6 hours PRN Agitation  diphenhydrAMINE Injectable 50 milliGRAM(s) IntraMuscular once PRN rash/eps/agitation  fluticasone propionate 50 MICROgram(s)/spray Nasal Spray 1 Spray(s) Both Nostrils two times a day PRN nasal congestion  haloperidol    Injectable 5 milliGRAM(s) IntraMuscular Once PRN extreme agitation secondary to psychosis  LORazepam     Tablet 1 milliGRAM(s) Oral every 6 hours PRN anxiety  LORazepam     Tablet 2 milliGRAM(s) Oral every 6 hours PRN agitation  LORazepam   Injectable 2 milliGRAM(s) IntraMuscular Once PRN extreme anxiety  melatonin. 5 milliGRAM(s) Oral at bedtime PRN Insomnia  nicotine  Polacrilex Gum 2 milliGRAM(s) Oral every 2 hours PRN cravings
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
## 2023-10-10 LAB
A1C WITH ESTIMATED AVERAGE GLUCOSE RESULT: 5 % — SIGNIFICANT CHANGE UP (ref 4–5.6)
CERULOPLASMIN SERPL-MCNC: 22 MG/DL — SIGNIFICANT CHANGE UP (ref 16–45)
CHOLEST SERPL-MCNC: 176 MG/DL — SIGNIFICANT CHANGE UP
ERYTHROCYTE [SEDIMENTATION RATE] IN BLOOD: 7 MM/HR — SIGNIFICANT CHANGE UP (ref 4–25)
ESTIMATED AVERAGE GLUCOSE: 97 — SIGNIFICANT CHANGE UP
FOLATE SERPL-MCNC: 7.5 NG/ML — SIGNIFICANT CHANGE UP (ref 3.1–17.5)
HDLC SERPL-MCNC: 65 MG/DL — SIGNIFICANT CHANGE UP
HIV 1+2 AB+HIV1 P24 AG SERPL QL IA: SIGNIFICANT CHANGE UP
LIPID PNL WITH DIRECT LDL SERPL: 100 MG/DL — HIGH
NON HDL CHOLESTEROL: 111 MG/DL — SIGNIFICANT CHANGE UP
TRIGL SERPL-MCNC: 56 MG/DL — SIGNIFICANT CHANGE UP
VIT B12 SERPL-MCNC: 1267 PG/ML — HIGH (ref 200–900)

## 2023-10-10 PROCEDURE — 90853 GROUP PSYCHOTHERAPY: CPT

## 2023-10-10 PROCEDURE — 99232 SBSQ HOSP IP/OBS MODERATE 35: CPT

## 2023-10-10 RX ADMIN — ARIPIPRAZOLE 10 MILLIGRAM(S): 15 TABLET ORAL at 19:43

## 2023-10-10 RX ADMIN — Medication 1 MILLIGRAM(S): at 19:44

## 2023-10-10 NOTE — BH INPATIENT PSYCHIATRY PROGRESS NOTE - CURRENT MEDICATION
MEDICATIONS  (STANDING):  ARIPiprazole 10 milliGRAM(s) Oral at bedtime  benztropine 1 milliGRAM(s) Oral at bedtime  clonazePAM  Tablet 1 milliGRAM(s) Oral at bedtime    MEDICATIONS  (PRN):  diphenhydrAMINE 50 milliGRAM(s) Oral every 6 hours PRN Agitation  diphenhydrAMINE Injectable 50 milliGRAM(s) IntraMuscular once PRN rash/eps/agitation  fluticasone propionate 50 MICROgram(s)/spray Nasal Spray 1 Spray(s) Both Nostrils two times a day PRN nasal congestion  haloperidol    Injectable 5 milliGRAM(s) IntraMuscular Once PRN extreme agitation secondary to psychosis  LORazepam     Tablet 2 milliGRAM(s) Oral every 6 hours PRN agitation  LORazepam     Tablet 1 milliGRAM(s) Oral every 6 hours PRN anxiety  LORazepam   Injectable 2 milliGRAM(s) IntraMuscular Once PRN extreme anxiety  melatonin. 5 milliGRAM(s) Oral at bedtime PRN Insomnia  nicotine  Polacrilex Gum 2 milliGRAM(s) Oral every 2 hours PRN cravings

## 2023-10-10 NOTE — BH INPATIENT PSYCHIATRY PROGRESS NOTE - PRN MEDS
MEDICATIONS  (PRN):  diphenhydrAMINE 50 milliGRAM(s) Oral every 6 hours PRN Agitation  diphenhydrAMINE Injectable 50 milliGRAM(s) IntraMuscular once PRN rash/eps/agitation  fluticasone propionate 50 MICROgram(s)/spray Nasal Spray 1 Spray(s) Both Nostrils two times a day PRN nasal congestion  haloperidol    Injectable 5 milliGRAM(s) IntraMuscular Once PRN extreme agitation secondary to psychosis  LORazepam     Tablet 2 milliGRAM(s) Oral every 6 hours PRN agitation  LORazepam     Tablet 1 milliGRAM(s) Oral every 6 hours PRN anxiety  LORazepam   Injectable 2 milliGRAM(s) IntraMuscular Once PRN extreme anxiety  melatonin. 5 milliGRAM(s) Oral at bedtime PRN Insomnia  nicotine  Polacrilex Gum 2 milliGRAM(s) Oral every 2 hours PRN cravings

## 2023-10-10 NOTE — BH INPATIENT PSYCHIATRY PROGRESS NOTE - NSBHASSESSSUMMFT_PSY_ALL_CORE
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 Mueller (a psychiatrist for [employer] union to which father belongs), prescribed Prozac 20mg, with some restlessness she noted after taking it vs feeling "sped up", no hx of sa , remote hx of NSSIB early in high-school but none recent, presented to the [facility] ED 2x in one week at the behest of her parents for 2 weeks of acute behavioral change, anxiety, disorganized/difficult to follow speech, poor sleep and passive SI.    On Admit to 1S patient recounts hx consistent with new onset psychotic illness with some decline in function x6mo likely prodromal and onset of acute positive sx (AH, IOR, though seemingly not yet crystalized delusions) in context of recently starting Prozac for depressive and anxious sx (also likely to have been prodromal sx). At this time patient not appearing with manic presentation though will seek further dx clarity with ongoing observation. Of note patient with some BPD traits most prominent when she was in high-school (cutting, abandonment fears), though BPD does not account for current presentation.     Week of 10/9: patient with some improvements in thought disorder, no overt AH at this time, though still quite referential and paranoid at times. Plan to increase Abilify. Was started on Cogentin 1mg after getting Haldol PRN over weekend as there was some concern for EPS though none apparent on exam today. Will order STI testing as per pt request.     Plan:   -admit to 1S on 9.39, patient with acute untreated psychosis, poor and worsening self care, requires inpatient hospitalization for stabilization and safety.   -Appropriate for routine checks, not expected to pose danger to self or others in controlled inpatient setting.   -Psychiatric:  -increase Abilify to 10mg from 5mg on 10/9  -1mg Klonopin for sleep   Add cogentin for dystonia   -Start standing Klonopin 1mg for insomnia  PRNS: Ativan 1mg for anxiety   Haldol 5mg, Ativan 2mg, Benadryl 50mg PO/IM PRN for agitation  I/G/M therapy  pt vapes nicotine, ordered for 2mg nicotine gum q2h PRN   -Medical: head CT reviewed and normal, reordered for b12, folate, ceruloplasmin, kimberlee, esr, RPR, HIV serologies, Lipids, A1C baselines as patient refused previously. Will order urine GC   EKG reviewed: rate of 60, NSR, QTC: 458  Utox (+ for THC), UA largely unremarkable but with ketones likes 2/2 poor PO intake, CBC is unremarkable, CMP mild hypokalemia to 3.4, otherwise WNL, TSH is normal, HCG is negative.      -Dispo:  pending stabilization

## 2023-10-11 LAB
ANA TITR SER: NEGATIVE — SIGNIFICANT CHANGE UP
N GONORRHOEA RRNA SPEC QL NAA+PROBE: SIGNIFICANT CHANGE UP
SPECIMEN SOURCE: SIGNIFICANT CHANGE UP
T PALLIDUM AB TITR SER: NEGATIVE — SIGNIFICANT CHANGE UP
T PALLIDUM AB TITR SER: NEGATIVE — SIGNIFICANT CHANGE UP

## 2023-10-11 PROCEDURE — 90853 GROUP PSYCHOTHERAPY: CPT

## 2023-10-11 PROCEDURE — 99232 SBSQ HOSP IP/OBS MODERATE 35: CPT

## 2023-10-11 RX ORDER — ARIPIPRAZOLE 15 MG/1
15 TABLET ORAL AT BEDTIME
Refills: 0 | Status: DISCONTINUED | OUTPATIENT
Start: 2023-10-11 | End: 2023-10-13

## 2023-10-11 RX ORDER — CLONAZEPAM 1 MG
1 TABLET ORAL AT BEDTIME
Refills: 0 | Status: DISCONTINUED | OUTPATIENT
Start: 2023-10-11 | End: 2023-10-18

## 2023-10-11 RX ADMIN — Medication 2 MILLIGRAM(S): at 20:51

## 2023-10-11 RX ADMIN — Medication 5 MILLIGRAM(S): at 21:35

## 2023-10-11 RX ADMIN — ARIPIPRAZOLE 15 MILLIGRAM(S): 15 TABLET ORAL at 20:49

## 2023-10-11 RX ADMIN — Medication 1 MILLIGRAM(S): at 20:51

## 2023-10-11 RX ADMIN — Medication 2 MILLIGRAM(S): at 11:39

## 2023-10-11 NOTE — BH INPATIENT PSYCHIATRY PROGRESS NOTE - CURRENT MEDICATION
MEDICATIONS  (STANDING):  ARIPiprazole 15 milliGRAM(s) Oral at bedtime  clonazePAM  Tablet 1 milliGRAM(s) Oral at bedtime    MEDICATIONS  (PRN):  diphenhydrAMINE 50 milliGRAM(s) Oral every 6 hours PRN Agitation  diphenhydrAMINE Injectable 50 milliGRAM(s) IntraMuscular once PRN rash/eps/agitation  fluticasone propionate 50 MICROgram(s)/spray Nasal Spray 1 Spray(s) Both Nostrils two times a day PRN nasal congestion  haloperidol    Injectable 5 milliGRAM(s) IntraMuscular Once PRN extreme agitation secondary to psychosis  LORazepam     Tablet 2 milliGRAM(s) Oral every 6 hours PRN agitation  LORazepam     Tablet 1 milliGRAM(s) Oral every 6 hours PRN anxiety  LORazepam   Injectable 2 milliGRAM(s) IntraMuscular Once PRN extreme anxiety  melatonin. 5 milliGRAM(s) Oral at bedtime PRN Insomnia  nicotine  Polacrilex Gum 2 milliGRAM(s) Oral every 2 hours PRN cravings

## 2023-10-11 NOTE — BH INPATIENT PSYCHIATRY PROGRESS NOTE - NSBHASSESSSUMMFT_PSY_ALL_CORE
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 Mueller (a psychiatrist for [employer] union to which father belongs), prescribed Prozac 20mg, with some restlessness she noted after taking it vs feeling "sped up", no hx of sa , remote hx of NSSIB early in high-school but none recent, presented to the [facility] ED 2x in one week at the behest of her parents for 2 weeks of acute behavioral change, anxiety, disorganized/difficult to follow speech, poor sleep and passive SI.    On Admit to 1S patient recounts hx consistent with new onset psychotic illness with some decline in function x6mo likely prodromal and onset of acute positive sx (AH, IOR, though seemingly not yet crystalized delusions) in context of recently starting Prozac for depressive and anxious sx (also likely to have been prodromal sx). At this time patient not appearing with manic presentation though will seek further dx clarity with ongoing observation. Of note patient with some BPD traits most prominent when she was in high-school (cutting, abandonment fears), though BPD does not account for current presentation.     Week of 10/9: patient with some improvements in thought disorder, no overt AH at this time, though still quite referential and paranoid at times. Plan to increase Abilify. Was started on Cogentin 1mg after getting Haldol PRN over weekend as there was some concern for EPS though none apparent on exam today. Will order STI testing as per pt request.     1. Admission status  9.39 (Emergency admission)    2. Significant lab findings  - UDS positive for THC    3. Psychotropic medication  - Aripiprazole 15 mg QHS (psychosis)  	- Start 10/2, inc 10/6, inc 10/9, inc 10/11  - Clonazepam 1 mg QHS (insomnia)    Agitation PRNs: Haloperidol PO/IM, Lorazepam PO/IM, Diphenhydramine PO/IM    4. Medical conditions, other medication, consults  None    5. Psychosocial interventions  - Patient provided verbal consent to speak with  ***  - CO 1:1: Not required  - Restrictions/allowances: None

## 2023-10-12 PROCEDURE — 90853 GROUP PSYCHOTHERAPY: CPT

## 2023-10-12 RX ORDER — DIPHENHYDRAMINE HCL 50 MG
50 CAPSULE ORAL ONCE
Refills: 0 | Status: COMPLETED | OUTPATIENT
Start: 2023-10-12 | End: 2023-10-12

## 2023-10-12 RX ORDER — HALOPERIDOL DECANOATE 100 MG/ML
5 INJECTION INTRAMUSCULAR ONCE
Refills: 0 | Status: COMPLETED | OUTPATIENT
Start: 2023-10-12 | End: 2023-10-12

## 2023-10-12 RX ADMIN — Medication 50 MILLIGRAM(S): at 10:30

## 2023-10-12 RX ADMIN — ARIPIPRAZOLE 15 MILLIGRAM(S): 15 TABLET ORAL at 21:49

## 2023-10-12 RX ADMIN — HALOPERIDOL DECANOATE 5 MILLIGRAM(S): 100 INJECTION INTRAMUSCULAR at 10:30

## 2023-10-12 RX ADMIN — Medication 5 MILLIGRAM(S): at 21:49

## 2023-10-12 RX ADMIN — Medication 2 MILLIGRAM(S): at 16:02

## 2023-10-12 RX ADMIN — Medication 2 MILLIGRAM(S): at 10:30

## 2023-10-12 RX ADMIN — Medication 1 MILLIGRAM(S): at 21:49

## 2023-10-12 NOTE — BH PSYCHOLOGY - CLINICIAN PSYCHOTHERAPY NOTE - NSBHPSYCHOLNARRATIVE_PSY_A_CORE FT
Patient was alert, superficially cooperative, and in control. Oriented x3. Casually dressed, fairly groomed. Maintained good eye contact. Speech normal in production, rate, volume, and tone. No abnormal psychomotor behavior. Mood "better but still confused" with congruent affect. Thought process goal-directed. Thought content relevant to discussion, although paranoia/ideas of reference were present. Denied auditory/visual hallucinations and suicidal/homicidal ideation, intent, plan, and urges to self-harm. Impulse control intact on unit, poor by history. Insight and judgment poor.    Patient reported some improvement since admission, stated she is "about 70% myself," continues to feel "confused" with her thoughts, "betrayed" by parents for bringing her to the hospital, also expressed vague interpersonal conflict with others on the unit. However, she stated that she has found groups helpful in becoming more "mindful and aware" of her own thoughts and feelings. Patient was also focused on discharge, believes she does not need to be in the hospital, wants to return to school and focus on her goals. Encouraged patient to continue to attend group and seek support from staff.

## 2023-10-12 NOTE — BH INPATIENT PSYCHIATRY PROGRESS NOTE - NSBHASSESSSUMMFT_PSY_ALL_CORE
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 Mueller (a psychiatrist for [employer] to which father belongs), prescribed Prozac 20mg, with some restlessness she noted after taking it vs feeling "sped up", no hx of sa , remote hx of NSSIB early in high-school but none recent, presented to the [facility] ED 2x in one week at the behest of her parents for 2 weeks of acute behavioral change, anxiety, disorganized/difficult to follow speech, poor sleep and passive SI.    On Admit to 1S patient recounts hx consistent with new onset psychotic illness with some decline in function x6mo likely prodromal and onset of acute positive sx (AH, IOR, though seemingly not yet crystalized delusions) in context of recently starting Prozac for depressive and anxious sx (also likely to have been prodromal sx). At this time patient not appearing with manic presentation though will seek further dx clarity with ongoing observation. Of note patient with some BPD traits most prominent when she was in high-school (cutting, abandonment fears), though BPD does not account for current presentation.     Week of 10/9: patient with some improvements in thought disorder, no overt AH at this time, though still quite referential and paranoid at times. Plan to increase Abilify. Was started on Cogentin 1mg after getting Haldol PRN over weekend as there was some concern for EPS though none apparent on exam today. Will order STI testing as per pt request.     1. Admission status  9.39 (Emergency admission)    2. Significant lab findings  - UDS positive for THC    3. Psychotropic medication  - Aripiprazole 15 mg QHS (psychosis)  	- Start 10/2, inc 10/6, inc 10/9, inc 10/11  - Clonazepam 1 mg QHS (insomnia)    Agitation PRNs: Haloperidol PO/IM, Lorazepam PO/IM, Diphenhydramine PO/IM    4. Medical conditions, other medication, consults  None    5. Psychosocial interventions  - Patient provided verbal consent to speak with TBD  - CO 1:1: Not required  - Restrictions/allowances: None

## 2023-10-13 PROCEDURE — 99232 SBSQ HOSP IP/OBS MODERATE 35: CPT

## 2023-10-13 RX ORDER — DIPHENHYDRAMINE HCL 50 MG
50 CAPSULE ORAL AT BEDTIME
Refills: 0 | Status: DISCONTINUED | OUTPATIENT
Start: 2023-10-13 | End: 2023-10-24

## 2023-10-13 RX ORDER — ARIPIPRAZOLE 15 MG/1
20 TABLET ORAL AT BEDTIME
Refills: 0 | Status: DISCONTINUED | OUTPATIENT
Start: 2023-10-13 | End: 2023-10-17

## 2023-10-13 RX ADMIN — Medication 50 MILLIGRAM(S): at 20:05

## 2023-10-13 RX ADMIN — ARIPIPRAZOLE 20 MILLIGRAM(S): 15 TABLET ORAL at 20:04

## 2023-10-13 RX ADMIN — Medication 1 MILLIGRAM(S): at 11:03

## 2023-10-13 RX ADMIN — Medication 1 MILLIGRAM(S): at 20:04

## 2023-10-13 RX ADMIN — Medication 1 MILLIGRAM(S): at 21:05

## 2023-10-13 RX ADMIN — Medication 2 MILLIGRAM(S): at 17:49

## 2023-10-13 NOTE — BH INPATIENT PSYCHIATRY PROGRESS NOTE - NSBHASSESSSUMMFT_PSY_ALL_CORE
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 Mueller (a psychiatrist for [employer] union to which father belongs), prescribed Prozac 20mg, with some restlessness she noted after taking it vs feeling "sped up", no hx of sa , remote hx of NSSIB early in high-school but none recent, presented to the [facility] ED 2x in one week at the behest of her parents for 2 weeks of acute behavioral change, anxiety, disorganized/difficult to follow speech, poor sleep and passive SI.    On Admit to 1S patient recounts hx consistent with new onset psychotic illness with some decline in function x6mo likely prodromal and onset of acute positive sx (AH, IOR, though seemingly not yet crystalized delusions) in context of recently starting Prozac for depressive and anxious sx (also likely to have been prodromal sx). At this time patient not appearing with manic presentation though will seek further dx clarity with ongoing observation. Of note patient with some BPD traits most prominent when she was in high-school (cutting, abandonment fears), though BPD does not account for current presentation.     Week of 10/9: patient with some improvements in thought disorder, no overt AH at this time, though still quite referential and paranoid at times. Plan to increase Abilify. Was started on Cogentin 1mg after getting Haldol PRN over weekend as there was some concern for EPS though none apparent on exam today. Will order STI testing as per pt request.  Abilify increased to 20mg.  Benadryl added for insomnia.    1. Admission status  9.39 (Emergency admission)    2. Significant lab findings  - UDS positive for THC    3. Psychotropic medication  - Aripiprazole 20 mg QHS (psychosis)  	- Start 10/2, inc 10/6, inc 10/9, inc 10/11, inc 10/13  - Clonazepam 1 mg QHS (insomnia)  - Benadryl 50mg hs for insomnia    Agitation PRNs: Haloperidol PO/IM, Lorazepam PO/IM, Diphenhydramine PO/IM    4. Medical conditions, other medication, consults  None    5. Psychosocial interventions  - Patient provided verbal consent to speak with TBD  - CO 1:1: Not required  - Restrictions/allowances: None

## 2023-10-13 NOTE — BH INPATIENT PSYCHIATRY PROGRESS NOTE - CURRENT MEDICATION
MEDICATIONS  (STANDING):  ARIPiprazole 20 milliGRAM(s) Oral at bedtime  clonazePAM  Tablet 1 milliGRAM(s) Oral at bedtime  diphenhydrAMINE 50 milliGRAM(s) Oral at bedtime    MEDICATIONS  (PRN):  diphenhydrAMINE 50 milliGRAM(s) Oral every 6 hours PRN Agitation  diphenhydrAMINE Injectable 50 milliGRAM(s) IntraMuscular once PRN rash/eps/agitation  fluticasone propionate 50 MICROgram(s)/spray Nasal Spray 1 Spray(s) Both Nostrils two times a day PRN nasal congestion  haloperidol    Injectable 5 milliGRAM(s) IntraMuscular Once PRN extreme agitation secondary to psychosis  LORazepam     Tablet 1 milliGRAM(s) Oral every 6 hours PRN anxiety  LORazepam     Tablet 2 milliGRAM(s) Oral every 6 hours PRN agitation  LORazepam   Injectable 2 milliGRAM(s) IntraMuscular Once PRN extreme anxiety  melatonin. 5 milliGRAM(s) Oral at bedtime PRN Insomnia  nicotine  Polacrilex Gum 2 milliGRAM(s) Oral every 2 hours PRN cravings

## 2023-10-13 NOTE — BH INPATIENT PSYCHIATRY PROGRESS NOTE - PRN MEDS
MEDICATIONS  (PRN):  diphenhydrAMINE 50 milliGRAM(s) Oral every 6 hours PRN Agitation  diphenhydrAMINE Injectable 50 milliGRAM(s) IntraMuscular once PRN rash/eps/agitation  fluticasone propionate 50 MICROgram(s)/spray Nasal Spray 1 Spray(s) Both Nostrils two times a day PRN nasal congestion  haloperidol    Injectable 5 milliGRAM(s) IntraMuscular Once PRN extreme agitation secondary to psychosis  LORazepam     Tablet 1 milliGRAM(s) Oral every 6 hours PRN anxiety  LORazepam     Tablet 2 milliGRAM(s) Oral every 6 hours PRN agitation  LORazepam   Injectable 2 milliGRAM(s) IntraMuscular Once PRN extreme anxiety  melatonin. 5 milliGRAM(s) Oral at bedtime PRN Insomnia  nicotine  Polacrilex Gum 2 milliGRAM(s) Oral every 2 hours PRN cravings

## 2023-10-14 RX ORDER — INFLUENZA VIRUS VACCINE 15; 15; 15; 15 UG/.5ML; UG/.5ML; UG/.5ML; UG/.5ML
0.5 SUSPENSION INTRAMUSCULAR ONCE
Refills: 0 | Status: COMPLETED | OUTPATIENT
Start: 2023-10-14 | End: 2023-10-14

## 2023-10-14 RX ADMIN — ARIPIPRAZOLE 20 MILLIGRAM(S): 15 TABLET ORAL at 20:17

## 2023-10-14 RX ADMIN — Medication 1 MILLIGRAM(S): at 20:18

## 2023-10-14 RX ADMIN — INFLUENZA VIRUS VACCINE 0.5 MILLILITER(S): 15; 15; 15; 15 SUSPENSION INTRAMUSCULAR at 15:10

## 2023-10-14 RX ADMIN — Medication 1 MILLIGRAM(S): at 14:02

## 2023-10-14 RX ADMIN — Medication 5 MILLIGRAM(S): at 20:18

## 2023-10-14 RX ADMIN — Medication 50 MILLIGRAM(S): at 20:17

## 2023-10-15 RX ORDER — IBUPROFEN 200 MG
400 TABLET ORAL EVERY 6 HOURS
Refills: 0 | Status: DISCONTINUED | OUTPATIENT
Start: 2023-10-15 | End: 2023-11-03

## 2023-10-15 RX ADMIN — Medication 5 MILLIGRAM(S): at 20:49

## 2023-10-15 RX ADMIN — Medication 2 MILLIGRAM(S): at 21:10

## 2023-10-15 RX ADMIN — Medication 50 MILLIGRAM(S): at 20:49

## 2023-10-15 RX ADMIN — Medication 1 MILLIGRAM(S): at 20:49

## 2023-10-15 RX ADMIN — Medication 1 MILLIGRAM(S): at 18:35

## 2023-10-15 RX ADMIN — ARIPIPRAZOLE 20 MILLIGRAM(S): 15 TABLET ORAL at 20:49

## 2023-10-15 RX ADMIN — Medication 400 MILLIGRAM(S): at 11:57

## 2023-10-16 PROCEDURE — 99232 SBSQ HOSP IP/OBS MODERATE 35: CPT

## 2023-10-16 PROCEDURE — 90853 GROUP PSYCHOTHERAPY: CPT

## 2023-10-16 RX ADMIN — Medication 2 MILLIGRAM(S): at 10:25

## 2023-10-16 RX ADMIN — Medication 1 MILLIGRAM(S): at 20:32

## 2023-10-16 RX ADMIN — Medication 1 MILLIGRAM(S): at 13:26

## 2023-10-16 RX ADMIN — Medication 5 MILLIGRAM(S): at 20:57

## 2023-10-16 RX ADMIN — Medication 400 MILLIGRAM(S): at 08:58

## 2023-10-16 RX ADMIN — ARIPIPRAZOLE 20 MILLIGRAM(S): 15 TABLET ORAL at 20:32

## 2023-10-16 RX ADMIN — Medication 50 MILLIGRAM(S): at 20:32

## 2023-10-16 NOTE — BH INPATIENT PSYCHIATRY PROGRESS NOTE - NSBHASSESSSUMMFT_PSY_ALL_CORE
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 Mueller (a psychiatrist for [employer] union to which father belongs), prescribed Prozac 20mg, with some restlessness she noted after taking it vs feeling "sped up", no hx of sa , remote hx of NSSIB early in high-school but none recent, presented to the [facility] ED 2x in one week at the behest of her parents for 2 weeks of acute behavioral change, anxiety, disorganized/difficult to follow speech, poor sleep and passive SI.    On Admit to 1S patient recounts hx consistent with new onset psychotic illness with some decline in function x6mo likely prodromal and onset of acute positive sx (AH, IOR, though seemingly not yet crystalized delusions) in context of recently starting Prozac for depressive and anxious sx (also likely to have been prodromal sx). At this time patient not appearing with manic presentation though will seek further dx clarity with ongoing observation. Of note patient with some BPD traits most prominent when she was in high-school (cutting, abandonment fears), though BPD does not account for current presentation.     Week of 10/9: patient with some improvements in thought disorder, no overt AH at this time, though still quite referential and paranoid at times. Plan to increase Abilify. Was started on Cogentin 1mg after getting Haldol PRN over weekend as there was some concern for EPS though none apparent on exam today. Will order STI testing as per pt request.  Abilify increased to 20mg.  Benadryl added for insomnia.  Week of 10/16: The patient is showing some improvement in symptoms.  Continue to have some paranoia, but getting better.  Tolerating medications well.  More visible on the unit.    1. Admission status  9.39 (Emergency admission)    2. Significant lab findings  - UDS positive for THC    3. Psychotropic medication  - Aripiprazole 20 mg QHS (psychosis)  	- Start 10/2, inc 10/6, inc 10/9, inc 10/11, inc 10/13  - Clonazepam 1 mg QHS (insomnia)  - Benadryl 50mg hs for insomnia    Agitation PRNs: Haloperidol PO/IM, Lorazepam PO/IM, Diphenhydramine PO/IM    4. Medical conditions, other medication, consults  None    5. 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 Mueller (a psychiatrist for [employer] union to which father belongs), prescribed Prozac 20mg, with some restlessness she noted after taking it vs feeling "sped up", no hx of sa , remote hx of NSSIB early in high-school but none recent, presented to the [facility] ED 2x in one week at the behest of her parents for 2 weeks of acute behavioral change, anxiety, disorganized/difficult to follow speech, poor sleep and passive SI.    On Admit to 1S patient recounts hx consistent with new onset psychotic illness with some decline in function x6mo likely prodromal and onset of acute positive sx (AH, IOR, though seemingly not yet crystalized delusions) in context of recently starting Prozac for depressive and anxious sx (also likely to have been prodromal sx). At this time patient not appearing with manic presentation though will seek further dx clarity with ongoing observation. Of note patient with some BPD traits most prominent when she was in high-school (cutting, abandonment fears), though BPD does not account for current presentation.     Week of 10/9: patient with some improvements in thought disorder, no overt AH at this time, though still quite referential and paranoid at times. Plan to increase Abilify. Was started on Cogentin 1mg after getting Haldol PRN over weekend as there was some concern for EPS though none apparent on exam today. Will order STI testing as per pt request.  Abilify increased to 20mg.  Benadryl added for insomnia.  Week of 10/16: The patient is showing some improvement in symptoms.  Continue to have some paranoia, but getting better.  Tolerating medications well.  More visible on the unit.  Discussed with nursing, will discontinue elopement precautions, as patient is calmer, more involved in treatment, no longer by the door.    1. Admission status  9.39 (Emergency admission)    2. Significant lab findings  - UDS positive for THC    3. Psychotropic medication  - Aripiprazole 20 mg QHS (psychosis)  	- Start 10/2, inc 10/6, inc 10/9, inc 10/11, inc 10/13  - Clonazepam 1 mg QHS (insomnia)  - Benadryl 50mg hs for insomnia    Agitation PRNs: Haloperidol PO/IM, Lorazepam PO/IM, Diphenhydramine PO/IM    4. Medical conditions, other medication, consults  None    5. Psychosocial interventions  - Patient provided verbal consent to speak with TBD  - CO 1:1: Not required  - Restrictions/allowances: None

## 2023-10-16 NOTE — BH INPATIENT PSYCHIATRY PROGRESS NOTE - PRN MEDS
MEDICATIONS  (PRN):  diphenhydrAMINE 50 milliGRAM(s) Oral every 6 hours PRN Agitation  diphenhydrAMINE Injectable 50 milliGRAM(s) IntraMuscular once PRN rash/eps/agitation  fluticasone propionate 50 MICROgram(s)/spray Nasal Spray 1 Spray(s) Both Nostrils two times a day PRN nasal congestion  haloperidol    Injectable 5 milliGRAM(s) IntraMuscular Once PRN extreme agitation secondary to psychosis  ibuprofen  Tablet. 400 milliGRAM(s) Oral every 6 hours PRN Mild Pain (1 - 3), Moderate Pain (4 - 6)  LORazepam     Tablet 1 milliGRAM(s) Oral every 6 hours PRN anxiety  LORazepam   Injectable 2 milliGRAM(s) IntraMuscular Once PRN extreme anxiety  melatonin. 5 milliGRAM(s) Oral at bedtime PRN Insomnia  nicotine  Polacrilex Gum 2 milliGRAM(s) Oral every 2 hours PRN cravings

## 2023-10-16 NOTE — BH INPATIENT PSYCHIATRY PROGRESS NOTE - CURRENT MEDICATION
MEDICATIONS  (STANDING):  ARIPiprazole 20 milliGRAM(s) Oral at bedtime  clonazePAM  Tablet 1 milliGRAM(s) Oral at bedtime  diphenhydrAMINE 50 milliGRAM(s) Oral at bedtime    MEDICATIONS  (PRN):  diphenhydrAMINE 50 milliGRAM(s) Oral every 6 hours PRN Agitation  diphenhydrAMINE Injectable 50 milliGRAM(s) IntraMuscular once PRN rash/eps/agitation  fluticasone propionate 50 MICROgram(s)/spray Nasal Spray 1 Spray(s) Both Nostrils two times a day PRN nasal congestion  haloperidol    Injectable 5 milliGRAM(s) IntraMuscular Once PRN extreme agitation secondary to psychosis  ibuprofen  Tablet. 400 milliGRAM(s) Oral every 6 hours PRN Mild Pain (1 - 3), Moderate Pain (4 - 6)  LORazepam     Tablet 1 milliGRAM(s) Oral every 6 hours PRN anxiety  LORazepam   Injectable 2 milliGRAM(s) IntraMuscular Once PRN extreme anxiety  melatonin. 5 milliGRAM(s) Oral at bedtime PRN Insomnia  nicotine  Polacrilex Gum 2 milliGRAM(s) Oral every 2 hours PRN cravings

## 2023-10-17 PROCEDURE — 99232 SBSQ HOSP IP/OBS MODERATE 35: CPT

## 2023-10-17 PROCEDURE — 90853 GROUP PSYCHOTHERAPY: CPT

## 2023-10-17 RX ORDER — ARIPIPRAZOLE 15 MG/1
20 TABLET ORAL DAILY
Refills: 0 | Status: DISCONTINUED | OUTPATIENT
Start: 2023-10-17 | End: 2023-10-18

## 2023-10-17 RX ORDER — TRAZODONE HCL 50 MG
25 TABLET ORAL AT BEDTIME
Refills: 0 | Status: DISCONTINUED | OUTPATIENT
Start: 2023-10-17 | End: 2023-10-29

## 2023-10-17 RX ADMIN — Medication 1 MILLIGRAM(S): at 21:12

## 2023-10-17 RX ADMIN — Medication 1 MILLIGRAM(S): at 02:06

## 2023-10-17 RX ADMIN — Medication 1 MILLIGRAM(S): at 11:14

## 2023-10-17 RX ADMIN — Medication 25 MILLIGRAM(S): at 21:12

## 2023-10-17 RX ADMIN — ARIPIPRAZOLE 20 MILLIGRAM(S): 15 TABLET ORAL at 11:10

## 2023-10-17 RX ADMIN — Medication 50 MILLIGRAM(S): at 21:12

## 2023-10-17 NOTE — BH INPATIENT PSYCHIATRY PROGRESS NOTE - NSBHASSESSSUMMFT_PSY_ALL_CORE
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 Mueller (a psychiatrist for [employer] union to which father belongs), prescribed Prozac 20mg, with some restlessness she noted after taking it vs feeling "sped up", no hx of sa , remote hx of NSSIB early in high-school but none recent, presented to the [facility] ED 2x in one week at the behest of her parents for 2 weeks of acute behavioral change, anxiety, disorganized/difficult to follow speech, poor sleep and passive SI.    On Admit to 1S patient recounts hx consistent with new onset psychotic illness with some decline in function x6mo likely prodromal and onset of acute positive sx (AH, IOR, though seemingly not yet crystalized delusions) in context of recently starting Prozac for depressive and anxious sx (also likely to have been prodromal sx). At this time patient not appearing with manic presentation though will seek further dx clarity with ongoing observation. Of note patient with some BPD traits most prominent when she was in high-school (cutting, abandonment fears), though BPD does not account for current presentation.     Week of 10/9: patient with some improvements in thought disorder, no overt AH at this time, though still quite referential and paranoid at times. Plan to increase Abilify. Was started on Cogentin 1mg after getting Haldol PRN over weekend as there was some concern for EPS though none apparent on exam today. Will order STI testing as per pt request.  Abilify increased to 20mg.  Benadryl added for insomnia.  Week of 10/16: The patient is showing some improvement in symptoms.  Continue to have some paranoia, but getting better.  Tolerating medications well.  More visible on the unit.  Discussed with nursing, will discontinue elopement precautions, as patient is calmer, more involved in treatment, no longer by the door.  Patient with some insomnia, will change Abilify to am and start trazodone 25mg hs.    1. Admission status  9.39 (Emergency admission)    2. Significant lab findings  - UDS positive for THC    3. Psychotropic medication  - Aripiprazole 20 mg daily (psychosis)  	- Start 10/2, inc 10/6, inc 10/9, inc 10/11, inc 10/13 - move to am dosing 10/17  - Clonazepam 1 mg QHS (insomnia)  - Benadryl 50mg hs for insomnia  - Start trazodone 25mg hs on 10/17    Agitation PRNs: Haloperidol PO/IM, Lorazepam PO/IM, Diphenhydramine PO/IM    4. Medical conditions, other medication, consults  None    5. Psychosocial interventions  - Patient provided verbal consent to speak with TBD  - CO 1:1: Not required  - Restrictions/allowances: None

## 2023-10-17 NOTE — BH INPATIENT PSYCHIATRY PROGRESS NOTE - CURRENT MEDICATION
MEDICATIONS  (STANDING):  ARIPiprazole 20 milliGRAM(s) Oral daily  clonazePAM  Tablet 1 milliGRAM(s) Oral at bedtime  diphenhydrAMINE 50 milliGRAM(s) Oral at bedtime  traZODone 25 milliGRAM(s) Oral at bedtime    MEDICATIONS  (PRN):  diphenhydrAMINE 50 milliGRAM(s) Oral every 6 hours PRN Agitation  diphenhydrAMINE Injectable 50 milliGRAM(s) IntraMuscular once PRN rash/eps/agitation  fluticasone propionate 50 MICROgram(s)/spray Nasal Spray 1 Spray(s) Both Nostrils two times a day PRN nasal congestion  haloperidol    Injectable 5 milliGRAM(s) IntraMuscular Once PRN extreme agitation secondary to psychosis  ibuprofen  Tablet. 400 milliGRAM(s) Oral every 6 hours PRN Mild Pain (1 - 3), Moderate Pain (4 - 6)  LORazepam     Tablet 1 milliGRAM(s) Oral every 6 hours PRN anxiety  LORazepam   Injectable 2 milliGRAM(s) IntraMuscular Once PRN extreme anxiety  melatonin. 5 milliGRAM(s) Oral at bedtime PRN Insomnia  nicotine  Polacrilex Gum 2 milliGRAM(s) Oral every 2 hours PRN cravings

## 2023-10-18 LAB
K2, SPICE RESULT: NEGATIVE — SIGNIFICANT CHANGE UP
K2, SPICE RESULT: NEGATIVE — SIGNIFICANT CHANGE UP

## 2023-10-18 PROCEDURE — 99232 SBSQ HOSP IP/OBS MODERATE 35: CPT

## 2023-10-18 PROCEDURE — 90853 GROUP PSYCHOTHERAPY: CPT

## 2023-10-18 RX ORDER — ARIPIPRAZOLE 15 MG/1
20 TABLET ORAL AT BEDTIME
Refills: 0 | Status: DISCONTINUED | OUTPATIENT
Start: 2023-10-18 | End: 2023-10-20

## 2023-10-18 RX ORDER — CLONAZEPAM 1 MG
1 TABLET ORAL AT BEDTIME
Refills: 0 | Status: DISCONTINUED | OUTPATIENT
Start: 2023-10-18 | End: 2023-10-24

## 2023-10-18 RX ADMIN — Medication 25 MILLIGRAM(S): at 20:06

## 2023-10-18 RX ADMIN — Medication 50 MILLIGRAM(S): at 20:06

## 2023-10-18 RX ADMIN — Medication 400 MILLIGRAM(S): at 14:01

## 2023-10-18 RX ADMIN — ARIPIPRAZOLE 20 MILLIGRAM(S): 15 TABLET ORAL at 20:06

## 2023-10-18 RX ADMIN — Medication 1 MILLIGRAM(S): at 20:06

## 2023-10-18 NOTE — BH INPATIENT PSYCHIATRY PROGRESS NOTE - CURRENT MEDICATION
MEDICATIONS  (STANDING):  ARIPiprazole 20 milliGRAM(s) Oral at bedtime  clonazePAM  Tablet 1 milliGRAM(s) Oral at bedtime  diphenhydrAMINE 50 milliGRAM(s) Oral at bedtime  traZODone 25 milliGRAM(s) Oral at bedtime    MEDICATIONS  (PRN):  diphenhydrAMINE 50 milliGRAM(s) Oral every 6 hours PRN Agitation  diphenhydrAMINE Injectable 50 milliGRAM(s) IntraMuscular once PRN rash/eps/agitation  fluticasone propionate 50 MICROgram(s)/spray Nasal Spray 1 Spray(s) Both Nostrils two times a day PRN nasal congestion  haloperidol    Injectable 5 milliGRAM(s) IntraMuscular Once PRN extreme agitation secondary to psychosis  ibuprofen  Tablet. 400 milliGRAM(s) Oral every 6 hours PRN Mild Pain (1 - 3), Moderate Pain (4 - 6)  LORazepam     Tablet 1 milliGRAM(s) Oral every 6 hours PRN anxiety  LORazepam   Injectable 2 milliGRAM(s) IntraMuscular Once PRN extreme anxiety  melatonin. 5 milliGRAM(s) Oral at bedtime PRN Insomnia  nicotine  Polacrilex Gum 2 milliGRAM(s) Oral every 2 hours PRN cravings

## 2023-10-18 NOTE — BH INPATIENT PSYCHIATRY PROGRESS NOTE - NSBHASSESSSUMMFT_PSY_ALL_CORE
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 Mueller (a psychiatrist for [employer] union to which father belongs), prescribed Prozac 20mg, with some restlessness she noted after taking it vs feeling "sped up", no hx of sa , remote hx of NSSIB early in high-school but none recent, presented to the [facility] ED 2x in one week at the behest of her parents for 2 weeks of acute behavioral change, anxiety, disorganized/difficult to follow speech, poor sleep and passive SI.    On Admit to 1S patient recounts hx consistent with new onset psychotic illness with some decline in function x6mo likely prodromal and onset of acute positive sx (AH, IOR, though seemingly not yet crystalized delusions) in context of recently starting Prozac for depressive and anxious sx (also likely to have been prodromal sx). At this time patient not appearing with manic presentation though will seek further dx clarity with ongoing observation. Of note patient with some BPD traits most prominent when she was in high-school (cutting, abandonment fears), though BPD does not account for current presentation.     Week of 10/9: patient with some improvements in thought disorder, no overt AH at this time, though still quite referential and paranoid at times. Plan to increase Abilify. Was started on Cogentin 1mg after getting Haldol PRN over weekend as there was some concern for EPS though none apparent on exam today. Will order STI testing as per pt request.  Abilify increased to 20mg.  Benadryl added for insomnia.  Week of 10/16: The patient is showing some improvement in symptoms.  Continue to have some paranoia, but getting better.  Tolerating medications well.  More visible on the unit.  Discussed with nursing, will discontinue elopement precautions, as patient is calmer, more involved in treatment, no longer by the door.  Patient with some insomnia, will change Abilify to am and start trazodone 25mg hs.  Abilify in the day caused sedation, will move back to hs.    1. Admission status  9.39 (Emergency admission)    2. Significant lab findings  - UDS positive for THC    3. Psychotropic medication  - Aripiprazole 20 mg daily (psychosis)  	- Start 10/2, inc 10/6, inc 10/9, inc 10/11, inc 10/13 - move to am dosing 10/17, move back to hs dosing 10/18  - Clonazepam 1 mg QHS (insomnia)  - Benadryl 50mg hs for insomnia  - Start trazodone 25mg hs on 10/17    Agitation PRNs: Haloperidol PO/IM, Lorazepam PO/IM, Diphenhydramine PO/IM    4. Medical conditions, other medication, consults  None    5. Psychosocial interventions  - Patient provided verbal consent to speak with TBD  - CO 1:1: Not required  - Restrictions/allowances: None

## 2023-10-19 PROCEDURE — 99232 SBSQ HOSP IP/OBS MODERATE 35: CPT

## 2023-10-19 PROCEDURE — 90853 GROUP PSYCHOTHERAPY: CPT

## 2023-10-19 RX ORDER — ACETAMINOPHEN 500 MG
650 TABLET ORAL EVERY 6 HOURS
Refills: 0 | Status: DISCONTINUED | OUTPATIENT
Start: 2023-10-19 | End: 2023-11-03

## 2023-10-19 RX ADMIN — Medication 650 MILLIGRAM(S): at 11:22

## 2023-10-19 RX ADMIN — Medication 1 MILLIGRAM(S): at 14:36

## 2023-10-19 RX ADMIN — Medication 25 MILLIGRAM(S): at 20:27

## 2023-10-19 RX ADMIN — Medication 1 MILLIGRAM(S): at 20:27

## 2023-10-19 RX ADMIN — ARIPIPRAZOLE 20 MILLIGRAM(S): 15 TABLET ORAL at 20:27

## 2023-10-19 NOTE — CHART NOTE - NSCHARTNOTEFT_GEN_A_CORE
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  General : NAD  Eyes: No lid swelling, erythema, crusting, discharge, No occular erythema, discharge, pain o movement. EOMI, PERRLA.   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 Currently no physical signs to indicate infection. Pt ordered for artifical tears PRN. 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  General : NAD  Eyes: No lid swelling, erythema, crusting, discharge, No occular erythema, discharge, pain on movement. EOMI, PERRLA.   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 Currently no physical signs to indicate infection. Pt ordered for artifical tears PRN.

## 2023-10-19 NOTE — BH INPATIENT PSYCHIATRY PROGRESS NOTE - CURRENT MEDICATION
MEDICATIONS  (STANDING):  ARIPiprazole 20 milliGRAM(s) Oral at bedtime  clonazePAM  Tablet 1 milliGRAM(s) Oral at bedtime  diphenhydrAMINE 50 milliGRAM(s) Oral at bedtime  traZODone 25 milliGRAM(s) Oral at bedtime    MEDICATIONS  (PRN):  acetaminophen     Tablet .. 650 milliGRAM(s) Oral every 6 hours PRN Mild Pain (1 - 3)  artificial  tears Solution 1 Drop(s) Both EYES three times a day PRN dry eyes  diphenhydrAMINE 50 milliGRAM(s) Oral every 6 hours PRN Agitation  diphenhydrAMINE Injectable 50 milliGRAM(s) IntraMuscular once PRN rash/eps/agitation  fluticasone propionate 50 MICROgram(s)/spray Nasal Spray 1 Spray(s) Both Nostrils two times a day PRN nasal congestion  haloperidol    Injectable 5 milliGRAM(s) IntraMuscular Once PRN extreme agitation secondary to psychosis  ibuprofen  Tablet. 400 milliGRAM(s) Oral every 6 hours PRN Mild Pain (1 - 3), Moderate Pain (4 - 6)  LORazepam     Tablet 1 milliGRAM(s) Oral every 6 hours PRN anxiety  LORazepam   Injectable 2 milliGRAM(s) IntraMuscular Once PRN extreme anxiety  melatonin. 5 milliGRAM(s) Oral at bedtime PRN Insomnia  nicotine  Polacrilex Gum 2 milliGRAM(s) Oral every 2 hours PRN cravings

## 2023-10-19 NOTE — BH INPATIENT PSYCHIATRY PROGRESS NOTE - PRN MEDS
MEDICATIONS  (PRN):  acetaminophen     Tablet .. 650 milliGRAM(s) Oral every 6 hours PRN Mild Pain (1 - 3)  artificial  tears Solution 1 Drop(s) Both EYES three times a day PRN dry eyes  diphenhydrAMINE 50 milliGRAM(s) Oral every 6 hours PRN Agitation  diphenhydrAMINE Injectable 50 milliGRAM(s) IntraMuscular once PRN rash/eps/agitation  fluticasone propionate 50 MICROgram(s)/spray Nasal Spray 1 Spray(s) Both Nostrils two times a day PRN nasal congestion  haloperidol    Injectable 5 milliGRAM(s) IntraMuscular Once PRN extreme agitation secondary to psychosis  ibuprofen  Tablet. 400 milliGRAM(s) Oral every 6 hours PRN Mild Pain (1 - 3), Moderate Pain (4 - 6)  LORazepam     Tablet 1 milliGRAM(s) Oral every 6 hours PRN anxiety  LORazepam   Injectable 2 milliGRAM(s) IntraMuscular Once PRN extreme anxiety  melatonin. 5 milliGRAM(s) Oral at bedtime PRN Insomnia  nicotine  Polacrilex Gum 2 milliGRAM(s) Oral every 2 hours PRN cravings

## 2023-10-20 PROCEDURE — 99232 SBSQ HOSP IP/OBS MODERATE 35: CPT

## 2023-10-20 PROCEDURE — 90853 GROUP PSYCHOTHERAPY: CPT

## 2023-10-20 RX ORDER — RISPERIDONE 4 MG/1
2 TABLET ORAL AT BEDTIME
Refills: 0 | Status: DISCONTINUED | OUTPATIENT
Start: 2023-10-20 | End: 2023-10-25

## 2023-10-20 RX ORDER — ARIPIPRAZOLE 15 MG/1
10 TABLET ORAL AT BEDTIME
Refills: 0 | Status: DISCONTINUED | OUTPATIENT
Start: 2023-10-20 | End: 2023-10-21

## 2023-10-20 RX ADMIN — Medication 650 MILLIGRAM(S): at 09:39

## 2023-10-20 RX ADMIN — RISPERIDONE 2 MILLIGRAM(S): 4 TABLET ORAL at 21:28

## 2023-10-20 RX ADMIN — ARIPIPRAZOLE 10 MILLIGRAM(S): 15 TABLET ORAL at 21:28

## 2023-10-20 RX ADMIN — Medication 650 MILLIGRAM(S): at 19:04

## 2023-10-20 RX ADMIN — Medication 25 MILLIGRAM(S): at 21:29

## 2023-10-20 RX ADMIN — Medication 1 MILLIGRAM(S): at 21:29

## 2023-10-20 RX ADMIN — Medication 50 MILLIGRAM(S): at 21:29

## 2023-10-20 RX ADMIN — Medication 1 DROP(S): at 21:29

## 2023-10-20 NOTE — BH INPATIENT PSYCHIATRY PROGRESS NOTE - CURRENT MEDICATION
MEDICATIONS  (STANDING):  ARIPiprazole 10 milliGRAM(s) Oral at bedtime  clonazePAM  Tablet 1 milliGRAM(s) Oral at bedtime  diphenhydrAMINE 50 milliGRAM(s) Oral at bedtime  risperiDONE  Disintegrating Tablet 2 milliGRAM(s) Oral at bedtime  traZODone 25 milliGRAM(s) Oral at bedtime    MEDICATIONS  (PRN):  acetaminophen     Tablet .. 650 milliGRAM(s) Oral every 6 hours PRN Mild Pain (1 - 3)  artificial  tears Solution 1 Drop(s) Both EYES three times a day PRN dry eyes  diphenhydrAMINE 50 milliGRAM(s) Oral every 6 hours PRN Agitation  diphenhydrAMINE Injectable 50 milliGRAM(s) IntraMuscular once PRN rash/eps/agitation  fluticasone propionate 50 MICROgram(s)/spray Nasal Spray 1 Spray(s) Both Nostrils two times a day PRN nasal congestion  haloperidol    Injectable 5 milliGRAM(s) IntraMuscular Once PRN extreme agitation secondary to psychosis  ibuprofen  Tablet. 400 milliGRAM(s) Oral every 6 hours PRN Mild Pain (1 - 3), Moderate Pain (4 - 6)  LORazepam     Tablet 1 milliGRAM(s) Oral every 6 hours PRN anxiety  LORazepam   Injectable 2 milliGRAM(s) IntraMuscular Once PRN extreme anxiety  melatonin. 5 milliGRAM(s) Oral at bedtime PRN Insomnia  nicotine  Polacrilex Gum 2 milliGRAM(s) Oral every 2 hours PRN cravings

## 2023-10-20 NOTE — BH INPATIENT PSYCHIATRY PROGRESS NOTE - NSBHCHARTREVIEWLAB_PSY_A_CORE FT
see assessment/plan 

## 2023-10-20 NOTE — BH INPATIENT PSYCHIATRY PROGRESS NOTE - NSBHASSESSSUMMFT_PSY_ALL_CORE
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 Mueller (a psychiatrist for [employer] to which father belongs), prescribed Prozac 20mg, with some restlessness she noted after taking it vs feeling "sped up", no hx of sa , remote hx of NSSIB early in high-school but none recent, presented to the [facility] ED 2x in one week at the behest of her parents for 2 weeks of acute behavioral change, anxiety, disorganized/difficult to follow speech, poor sleep and passive SI.    On Admit to 1S patient recounts hx consistent with new onset psychotic illness with some decline in function x6mo likely prodromal and onset of acute positive sx (AH, IOR, though seemingly not yet crystalized delusions) in context of recently starting Prozac for depressive and anxious sx (also likely to have been prodromal sx). At this time patient not appearing with manic presentation though will seek further dx clarity with ongoing observation. Of note patient with some BPD traits most prominent when she was in high-school (cutting, abandonment fears), though BPD does not account for current presentation.     Week of 10/9: patient with some improvements in thought disorder, no overt AH at this time, though still quite referential and paranoid at times. Plan to increase Abilify. Was started on Cogentin 1mg after getting Haldol PRN over weekend as there was some concern for EPS though none apparent on exam today. Will order STI testing as per pt request.  Abilify increased to 20mg.  Benadryl added for insomnia.  Week of 10/16: The patient is showing some improvement in symptoms.  Continue to have some paranoia, but getting better.  Tolerating medications well.  More visible on the unit.  Discussed with nursing, will discontinue elopement precautions, as patient is calmer, more involved in treatment, no longer by the door.  Patient with some insomnia, will change Abilify to am and start trazodone 25mg hs.  Abilify in the day caused sedation, will move back to hs.    1. Admission status  9.39 (Emergency admission)    2. Significant lab findings  - UDS positive for THC    3. Psychotropic medication  - Risperidone m-tabs 2 mg QHS (psychosis)  	- Start 10/20  - Aripiprazole 10 mg QHS (psychosis)  	- Start 10/2, inc 10/6, inc 10/9, inc 10/11, inc 10/13, dec 10/20  	- Cross-titrating to risperidone due to poor efficacy  - Clonazepam 1 mg QHS (insomnia)  - Benadryl 50mg hs for insomnia  - Start trazodone 25mg hs on 10/17    Agitation PRNs: Haloperidol PO/IM, Lorazepam PO/IM, Diphenhydramine PO/IM    4. Medical conditions, other medication, consults  None    5. Psychosocial interventions  - Patient provided verbal consent to speak with TBD  - CO 1:1: Not required  - Restrictions/allowances: None

## 2023-10-21 PROCEDURE — 99232 SBSQ HOSP IP/OBS MODERATE 35: CPT

## 2023-10-21 RX ORDER — ARIPIPRAZOLE 15 MG/1
7.5 TABLET ORAL AT BEDTIME
Refills: 0 | Status: DISCONTINUED | OUTPATIENT
Start: 2023-10-21 | End: 2023-10-23

## 2023-10-21 RX ADMIN — ARIPIPRAZOLE 7.5 MILLIGRAM(S): 15 TABLET ORAL at 20:49

## 2023-10-21 RX ADMIN — Medication 2 MILLIGRAM(S): at 14:37

## 2023-10-21 RX ADMIN — Medication 1 MILLIGRAM(S): at 15:06

## 2023-10-21 RX ADMIN — Medication 650 MILLIGRAM(S): at 14:04

## 2023-10-21 RX ADMIN — RISPERIDONE 2 MILLIGRAM(S): 4 TABLET ORAL at 20:49

## 2023-10-21 RX ADMIN — Medication 1 MILLIGRAM(S): at 20:49

## 2023-10-21 RX ADMIN — Medication 5 MILLIGRAM(S): at 03:16

## 2023-10-21 RX ADMIN — Medication 650 MILLIGRAM(S): at 11:39

## 2023-10-21 RX ADMIN — Medication 50 MILLIGRAM(S): at 20:49

## 2023-10-21 RX ADMIN — Medication 25 MILLIGRAM(S): at 20:48

## 2023-10-21 NOTE — BH INPATIENT PSYCHIATRY PROGRESS NOTE - NSBHASSESSSUMMFT_PSY_ALL_CORE
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 Mueller (a psychiatrist for [employer] union to which father belongs), prescribed Prozac 20mg, with some restlessness she noted after taking it vs feeling "sped up", no hx of sa , remote hx of NSSIB early in high-school but none recent, presented to the [facility] ED 2x in one week at the behest of her parents for 2 weeks of acute behavioral change, anxiety, disorganized/difficult to follow speech, poor sleep and passive SI.    On Admit to 1S patient recounts hx consistent with new onset psychotic illness with some decline in function x6mo likely prodromal and onset of acute positive sx (AH, IOR, though seemingly not yet crystalized delusions) in context of recently starting Prozac for depressive and anxious sx (also likely to have been prodromal sx). At this time patient not appearing with manic presentation though will seek further dx clarity with ongoing observation. Of note patient with some BPD traits most prominent when she was in high-school (cutting, abandonment fears), though BPD does not account for current presentation.     Week of 10/9: patient with some improvements in thought disorder, no overt AH at this time, though still quite referential and paranoid at times. Plan to increase Abilify. Was started on Cogentin 1mg after getting Haldol PRN over weekend as there was some concern for EPS though none apparent on exam today. Will order STI testing as per pt request.  Abilify increased to 20mg.  Benadryl added for insomnia.  Week of 10/16: The patient is showing some improvement in symptoms.  Continue to have some paranoia, but getting better.  Tolerating medications well.  More visible on the unit.  Discussed with nursing, will discontinue elopement precautions, as patient is calmer, more involved in treatment, no longer by the door.  Patient with some insomnia, will change Abilify to am and start trazodone 25mg hs.  Abilify in the day caused sedation, will move back to hs.    10/21: oddly related, guarded, suspected delusions. will decrease Abilify to 7.5mg hs.     1. Admission status  9.39 (Emergency admission)    2. Significant lab findings  - UDS positive for THC    3. Psychotropic medication  - Risperidone m-tabs 2 mg QHS (psychosis)  	- Start 10/20  - Aripiprazole 10 mg QHS (psychosis)  	- Start 10/2, inc 10/6, inc 10/9, inc 10/11, inc 10/13, dec 10/20, dec 10/21  	- Cross-titrating to risperidone due to poor efficacy  - Clonazepam 1 mg QHS (insomnia)  - Benadryl 50mg hs for insomnia  - Start trazodone 25mg hs on 10/17    Agitation PRNs: Haloperidol PO/IM, Lorazepam PO/IM, Diphenhydramine PO/IM    4. Medical conditions, other medication, consults  None    5. Psychosocial interventions  - Patient provided verbal consent to speak with TBD  - CO 1:1: Not required  - Restrictions/allowances: None

## 2023-10-21 NOTE — BH INPATIENT PSYCHIATRY PROGRESS NOTE - CURRENT MEDICATION
MEDICATIONS  (STANDING):  ARIPiprazole 7.5 milliGRAM(s) Oral at bedtime  clonazePAM  Tablet 1 milliGRAM(s) Oral at bedtime  diphenhydrAMINE 50 milliGRAM(s) Oral at bedtime  risperiDONE  Disintegrating Tablet 2 milliGRAM(s) Oral at bedtime  traZODone 25 milliGRAM(s) Oral at bedtime    MEDICATIONS  (PRN):  acetaminophen     Tablet .. 650 milliGRAM(s) Oral every 6 hours PRN Mild Pain (1 - 3)  artificial  tears Solution 1 Drop(s) Both EYES three times a day PRN dry eyes  diphenhydrAMINE 50 milliGRAM(s) Oral every 6 hours PRN Agitation  diphenhydrAMINE Injectable 50 milliGRAM(s) IntraMuscular once PRN rash/eps/agitation  fluticasone propionate 50 MICROgram(s)/spray Nasal Spray 1 Spray(s) Both Nostrils two times a day PRN nasal congestion  haloperidol    Injectable 5 milliGRAM(s) IntraMuscular Once PRN extreme agitation secondary to psychosis  ibuprofen  Tablet. 400 milliGRAM(s) Oral every 6 hours PRN Mild Pain (1 - 3), Moderate Pain (4 - 6)  LORazepam     Tablet 1 milliGRAM(s) Oral every 6 hours PRN anxiety  LORazepam   Injectable 2 milliGRAM(s) IntraMuscular Once PRN extreme anxiety  melatonin. 5 milliGRAM(s) Oral at bedtime PRN Insomnia  nicotine  Polacrilex Gum 2 milliGRAM(s) Oral every 2 hours PRN cravings

## 2023-10-22 PROCEDURE — 99231 SBSQ HOSP IP/OBS SF/LOW 25: CPT

## 2023-10-22 RX ADMIN — RISPERIDONE 2 MILLIGRAM(S): 4 TABLET ORAL at 20:59

## 2023-10-22 RX ADMIN — Medication 1 MILLIGRAM(S): at 21:00

## 2023-10-22 RX ADMIN — ARIPIPRAZOLE 7.5 MILLIGRAM(S): 15 TABLET ORAL at 21:00

## 2023-10-22 RX ADMIN — Medication 25 MILLIGRAM(S): at 20:59

## 2023-10-22 RX ADMIN — Medication 5 MILLIGRAM(S): at 00:42

## 2023-10-22 NOTE — BH INPATIENT PSYCHIATRY PROGRESS NOTE - NSBHASSESSSUMMFT_PSY_ALL_CORE
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 Mueller (a psychiatrist for [employer] union to which father belongs), prescribed Prozac 20mg, with some restlessness she noted after taking it vs feeling "sped up", no hx of sa , remote hx of NSSIB early in high-school but none recent, presented to the [facility] ED 2x in one week at the behest of her parents for 2 weeks of acute behavioral change, anxiety, disorganized/difficult to follow speech, poor sleep and passive SI.    On Admit to 1S patient recounts hx consistent with new onset psychotic illness with some decline in function x6mo likely prodromal and onset of acute positive sx (AH, IOR, though seemingly not yet crystalized delusions) in context of recently starting Prozac for depressive and anxious sx (also likely to have been prodromal sx). At this time patient not appearing with manic presentation though will seek further dx clarity with ongoing observation. Of note patient with some BPD traits most prominent when she was in high-school (cutting, abandonment fears), though BPD does not account for current presentation.     Week of 10/9: patient with some improvements in thought disorder, no overt AH at this time, though still quite referential and paranoid at times. Plan to increase Abilify. Was started on Cogentin 1mg after getting Haldol PRN over weekend as there was some concern for EPS though none apparent on exam today. Will order STI testing as per pt request.  Abilify increased to 20mg.  Benadryl added for insomnia.  Week of 10/16: The patient is showing some improvement in symptoms.  Continue to have some paranoia, but getting better.  Tolerating medications well.  More visible on the unit.  Discussed with nursing, will discontinue elopement precautions, as patient is calmer, more involved in treatment, no longer by the door.  Patient with some insomnia, will change Abilify to am and start trazodone 25mg hs.  Abilify in the day caused sedation, will move back to hs.    10/21: oddly related, guarded, suspected delusions. will decrease Abilify to 7.5mg hs.   10/22: no overt delusions, reported feeling depressed, no SI/I/P, ordered for repeat BP    1. Admission status  9.39 (Emergency admission)    2. Significant lab findings  - UDS positive for THC    3. Psychotropic medication  - Risperidone m-tabs 2 mg QHS (psychosis)  	- Start 10/20  - Aripiprazole 10 mg QHS (psychosis)  	- Start 10/2, inc 10/6, inc 10/9, inc 10/11, inc 10/13, dec 10/20, dec 10/21  	- Cross-titrating to risperidone due to poor efficacy  - Clonazepam 1 mg QHS (insomnia)  - Benadryl 50mg hs for insomnia  - Start trazodone 25mg hs on 10/17    Agitation PRNs: Haloperidol PO/IM, Lorazepam PO/IM, Diphenhydramine PO/IM    4. Medical conditions, other medication, consults  None    5. Psychosocial interventions  - Patient provided verbal consent to speak with TBD  - CO 1:1: Not required  - Restrictions/allowances: None

## 2023-10-23 PROCEDURE — 90853 GROUP PSYCHOTHERAPY: CPT

## 2023-10-23 PROCEDURE — 99232 SBSQ HOSP IP/OBS MODERATE 35: CPT

## 2023-10-23 RX ORDER — ARIPIPRAZOLE 15 MG/1
5 TABLET ORAL AT BEDTIME
Refills: 0 | Status: COMPLETED | OUTPATIENT
Start: 2023-10-23 | End: 2023-10-24

## 2023-10-23 RX ADMIN — ARIPIPRAZOLE 5 MILLIGRAM(S): 15 TABLET ORAL at 21:08

## 2023-10-23 RX ADMIN — Medication 25 MILLIGRAM(S): at 21:08

## 2023-10-23 RX ADMIN — Medication 1 MILLIGRAM(S): at 21:08

## 2023-10-23 RX ADMIN — Medication 2 MILLIGRAM(S): at 20:21

## 2023-10-23 RX ADMIN — RISPERIDONE 2 MILLIGRAM(S): 4 TABLET ORAL at 21:08

## 2023-10-23 NOTE — BH INPATIENT PSYCHIATRY PROGRESS NOTE - CURRENT MEDICATION
MEDICATIONS  (STANDING):  ARIPiprazole 5 milliGRAM(s) Oral at bedtime  clonazePAM  Tablet 1 milliGRAM(s) Oral at bedtime  diphenhydrAMINE 50 milliGRAM(s) Oral at bedtime  risperiDONE  Disintegrating Tablet 2 milliGRAM(s) Oral at bedtime  traZODone 25 milliGRAM(s) Oral at bedtime    MEDICATIONS  (PRN):  acetaminophen     Tablet .. 650 milliGRAM(s) Oral every 6 hours PRN Mild Pain (1 - 3)  artificial  tears Solution 1 Drop(s) Both EYES three times a day PRN dry eyes  diphenhydrAMINE 50 milliGRAM(s) Oral every 6 hours PRN Agitation  diphenhydrAMINE Injectable 50 milliGRAM(s) IntraMuscular once PRN rash/eps/agitation  fluticasone propionate 50 MICROgram(s)/spray Nasal Spray 1 Spray(s) Both Nostrils two times a day PRN nasal congestion  haloperidol    Injectable 5 milliGRAM(s) IntraMuscular Once PRN extreme agitation secondary to psychosis  ibuprofen  Tablet. 400 milliGRAM(s) Oral every 6 hours PRN Mild Pain (1 - 3), Moderate Pain (4 - 6)  LORazepam     Tablet 1 milliGRAM(s) Oral every 6 hours PRN anxiety  LORazepam   Injectable 2 milliGRAM(s) IntraMuscular Once PRN extreme anxiety  melatonin. 5 milliGRAM(s) Oral at bedtime PRN Insomnia  nicotine  Polacrilex Gum 2 milliGRAM(s) Oral every 2 hours PRN cravings

## 2023-10-23 NOTE — DIETITIAN INITIAL EVALUATION ADULT - OTHER INFO
Pt is a 24 y/o female with Hx of Depression. No pertinent medical history. Pt was BIB EMS activated by police for arguing with parents, with symptoms of paranoia, disorganized behavior and confused. Pt seen for length of stay. Met Pt in her room. Reports good appetite/po intake at present. No GI distress noted. NKFA. Food preferences explored and implemented. UBW: 135 lbs.

## 2023-10-23 NOTE — BH INPATIENT PSYCHIATRY PROGRESS NOTE - NSBHASSESSSUMMFT_PSY_ALL_CORE
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 Mueller (a psychiatrist for [employer] union to which father belongs), prescribed Prozac 20mg, with some restlessness she noted after taking it vs feeling "sped up", no hx of sa , remote hx of NSSIB early in high-school but none recent, presented to the [facility] ED 2x in one week at the behest of her parents for 2 weeks of acute behavioral change, anxiety, disorganized/difficult to follow speech, poor sleep and passive SI.    On Admit to 1S patient recounts hx consistent with new onset psychotic illness with some decline in function x6mo likely prodromal and onset of acute positive sx (AH, IOR, though seemingly not yet crystalized delusions) in context of recently starting Prozac for depressive and anxious sx (also likely to have been prodromal sx). At this time patient not appearing with manic presentation though will seek further dx clarity with ongoing observation. Of note patient with some BPD traits most prominent when she was in high-school (cutting, abandonment fears), though BPD does not account for current presentation.     Week of 10/9: patient with some improvements in thought disorder, no overt AH at this time, though still quite referential and paranoid at times. Plan to increase Abilify. Was started on Cogentin 1mg after getting Haldol PRN over weekend as there was some concern for EPS though none apparent on exam today. Will order STI testing as per pt request.  Abilify increased to 20mg.  Benadryl added for insomnia.  Week of 10/16: The patient is showing some improvement in symptoms.  Continue to have some paranoia, but getting better.  Tolerating medications well.  More visible on the unit.  Discussed with nursing, will discontinue elopement precautions, as patient is calmer, more involved in treatment, no longer by the door.  Patient with some insomnia, will change Abilify to am and start trazodone 25mg hs.  Abilify in the day caused sedation, will move back to hs.  Week of 10/23: Psychotic symptoms are improving since switching to Risperdal, though she does not believe her experiences were caused by her state of mind. Continuing to cross-titrate Abilify to Risperdal.      1. Admission status  9.39 (Emergency admission)    2. Significant lab findings  - UDS positive for THC    3. Psychotropic medication  - Risperidone m-tabs 2 mg QHS (psychosis)  	- Start 10/20  - Aripiprazole 5 mg QHS (psychosis)  	- Start 10/2, inc 10/6, inc 10/9, inc 10/11, inc 10/13, dec 10/20, dec 10/21, dec 10/23  	- Cross-titrating to risperidone due to poor efficacy  - Clonazepam 1 mg QHS (insomnia)  - Benadryl 50mg hs for insomnia  - Start trazodone 25mg hs on 10/17    Agitation PRNs: Haloperidol PO/IM, Lorazepam PO/IM, Diphenhydramine PO/IM    4. Medical conditions, other medication, consults  None    5. Psychosocial interventions  - Patient provided verbal consent to speak with mother  - CO 1:1: Not required  - Restrictions/allowances: None

## 2023-10-24 PROCEDURE — 90853 GROUP PSYCHOTHERAPY: CPT

## 2023-10-24 PROCEDURE — 99233 SBSQ HOSP IP/OBS HIGH 50: CPT

## 2023-10-24 RX ORDER — CLONAZEPAM 1 MG
0.5 TABLET ORAL AT BEDTIME
Refills: 0 | Status: DISCONTINUED | OUTPATIENT
Start: 2023-10-24 | End: 2023-10-25

## 2023-10-24 RX ORDER — HYDROXYZINE HCL 10 MG
25 TABLET ORAL THREE TIMES A DAY
Refills: 0 | Status: DISCONTINUED | OUTPATIENT
Start: 2023-10-24 | End: 2023-11-03

## 2023-10-24 RX ADMIN — ARIPIPRAZOLE 5 MILLIGRAM(S): 15 TABLET ORAL at 20:53

## 2023-10-24 RX ADMIN — Medication 0.5 MILLIGRAM(S): at 20:53

## 2023-10-24 RX ADMIN — RISPERIDONE 2 MILLIGRAM(S): 4 TABLET ORAL at 20:53

## 2023-10-24 RX ADMIN — Medication 25 MILLIGRAM(S): at 20:53

## 2023-10-24 RX ADMIN — Medication 400 MILLIGRAM(S): at 13:17

## 2023-10-24 RX ADMIN — Medication 25 MILLIGRAM(S): at 16:38

## 2023-10-24 NOTE — BH INPATIENT PSYCHIATRY PROGRESS NOTE - CURRENT MEDICATION
MEDICATIONS  (STANDING):  ARIPiprazole 5 milliGRAM(s) Oral at bedtime  clonazePAM  Tablet 0.5 milliGRAM(s) Oral at bedtime  risperiDONE  Disintegrating Tablet 2 milliGRAM(s) Oral at bedtime  traZODone 25 milliGRAM(s) Oral at bedtime    MEDICATIONS  (PRN):  acetaminophen     Tablet .. 650 milliGRAM(s) Oral every 6 hours PRN Mild Pain (1 - 3)  artificial  tears Solution 1 Drop(s) Both EYES three times a day PRN dry eyes  diphenhydrAMINE 50 milliGRAM(s) Oral every 6 hours PRN Agitation  diphenhydrAMINE Injectable 50 milliGRAM(s) IntraMuscular once PRN rash/eps/agitation  fluticasone propionate 50 MICROgram(s)/spray Nasal Spray 1 Spray(s) Both Nostrils two times a day PRN nasal congestion  haloperidol    Injectable 5 milliGRAM(s) IntraMuscular Once PRN extreme agitation secondary to psychosis  ibuprofen  Tablet. 400 milliGRAM(s) Oral every 6 hours PRN Mild Pain (1 - 3), Moderate Pain (4 - 6)  LORazepam     Tablet 1 milliGRAM(s) Oral every 6 hours PRN anxiety  LORazepam   Injectable 2 milliGRAM(s) IntraMuscular Once PRN extreme anxiety  melatonin. 5 milliGRAM(s) Oral at bedtime PRN Insomnia  nicotine  Polacrilex Gum 2 milliGRAM(s) Oral every 2 hours PRN cravings

## 2023-10-24 NOTE — BH INPATIENT PSYCHIATRY PROGRESS NOTE - NSBHASSESSSUMMFT_PSY_ALL_CORE
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 Mueller (a psychiatrist for [employer] union to which father belongs), prescribed Prozac 20mg, with some restlessness she noted after taking it vs feeling "sped up", no hx of sa , remote hx of NSSIB early in high-school but none recent, presented to the [facility] ED 2x in one week at the behest of her parents for 2 weeks of acute behavioral change, anxiety, disorganized/difficult to follow speech, poor sleep and passive SI.    On Admit to 1S patient recounts hx consistent with new onset psychotic illness with some decline in function x6mo likely prodromal and onset of acute positive sx (AH, IOR, though seemingly not yet crystalized delusions) in context of recently starting Prozac for depressive and anxious sx (also likely to have been prodromal sx). At this time patient not appearing with manic presentation though will seek further dx clarity with ongoing observation. Of note patient with some BPD traits most prominent when she was in high-school (cutting, abandonment fears), though BPD does not account for current presentation.     Week of 10/9: patient with some improvements in thought disorder, no overt AH at this time, though still quite referential and paranoid at times. Plan to increase Abilify. Was started on Cogentin 1mg after getting Haldol PRN over weekend as there was some concern for EPS though none apparent on exam today. Will order STI testing as per pt request.  Abilify increased to 20mg.  Benadryl added for insomnia.  Week of 10/16: The patient is showing some improvement in symptoms.  Continue to have some paranoia, but getting better.  Tolerating medications well.  More visible on the unit.  Discussed with nursing, will discontinue elopement precautions, as patient is calmer, more involved in treatment, no longer by the door.  Patient with some insomnia, will change Abilify to am and start trazodone 25mg hs.  Abilify in the day caused sedation, will move back to hs.  Week of 10/23: Psychotic symptoms are improving since switching to Risperdal, though she does not believe her experiences were caused by her state of mind. Continuing to cross-titrate Abilify to Risperdal.      1. Admission status  9.39 (Emergency admission)    2. Significant lab findings  - UDS positive for THC    3. Psychotropic medication  - Risperidone m-tabs 2 mg QHS (psychosis)  	- Start 10/20  - Aripiprazole 5 mg QHS (psychosis)  	- Start 10/2, inc 10/6, inc 10/9, inc 10/11, inc 10/13, dec 10/20, dec 10/21, dec 10/23  	- Cross-titrating to risperidone due to poor efficacy  - Clonazepam 0.5 mg QHS (insomnia)	  	- Tapering to DC due to continued oversedation  - Trazodone 25mg hs on 10/17    Agitation PRNs: Haloperidol PO/IM, Lorazepam PO/IM, Diphenhydramine PO/IM    4. Medical conditions, other medication, consults  None    5. Psychosocial interventions  - Patient provided verbal consent to speak with mother  - CO 1:1: Not required  - Restrictions/allowances: None

## 2023-10-25 PROCEDURE — 99233 SBSQ HOSP IP/OBS HIGH 50: CPT

## 2023-10-25 PROCEDURE — 90853 GROUP PSYCHOTHERAPY: CPT

## 2023-10-25 RX ORDER — RISPERIDONE 4 MG/1
3 TABLET ORAL AT BEDTIME
Refills: 0 | Status: DISCONTINUED | OUTPATIENT
Start: 2023-10-25 | End: 2023-10-29

## 2023-10-25 RX ADMIN — Medication 25 MILLIGRAM(S): at 20:21

## 2023-10-25 RX ADMIN — RISPERIDONE 3 MILLIGRAM(S): 4 TABLET ORAL at 20:21

## 2023-10-25 RX ADMIN — Medication 2 MILLIGRAM(S): at 11:58

## 2023-10-25 RX ADMIN — Medication 0.5 MILLIGRAM(S): at 20:21

## 2023-10-25 NOTE — BH INPATIENT PSYCHIATRY PROGRESS NOTE - NSBHASSESSSUMMFT_PSY_ALL_CORE
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 Mueller (a psychiatrist for [employer] to which father belongs), prescribed Prozac 20mg, with some restlessness she noted after taking it vs feeling "sped up", no hx of sa , remote hx of NSSIB early in high-school but none recent, presented to the [facility] ED 2x in one week at the behest of her parents for 2 weeks of acute behavioral change, anxiety, disorganized/difficult to follow speech, poor sleep and passive SI.    On Admit to 1S patient recounts hx consistent with new onset psychotic illness with some decline in function x6mo likely prodromal and onset of acute positive sx (AH, IOR, though seemingly not yet crystalized delusions) in context of recently starting Prozac for depressive and anxious sx (also likely to have been prodromal sx). At this time patient not appearing with manic presentation though will seek further dx clarity with ongoing observation. Of note patient with some BPD traits most prominent when she was in high-school (cutting, abandonment fears), though BPD does not account for current presentation.     Week of 10/9: patient with some improvements in thought disorder, no overt AH at this time, though still quite referential and paranoid at times. Plan to increase Abilify. Was started on Cogentin 1mg after getting Haldol PRN over weekend as there was some concern for EPS though none apparent on exam today. Will order STI testing as per pt request.  Abilify increased to 20mg.  Benadryl added for insomnia.  Week of 10/16: The patient is showing some improvement in symptoms.  Continue to have some paranoia, but getting better.  Tolerating medications well.  More visible on the unit.  Discussed with nursing, will discontinue elopement precautions, as patient is calmer, more involved in treatment, no longer by the door.  Patient with some insomnia, will change Abilify to am and start trazodone 25mg hs.  Abilify in the day caused sedation, will move back to hs.  Week of 10/23: Psychotic symptoms are improving since switching to Risperdal, though she does not believe her experiences were caused by her state of mind. Continuing to cross-titrate Abilify to Risperdal.      1. Admission status  9.39 (Emergency admission)    2. Significant lab findings  - UDS positive for THC    3. Psychotropic medication  - Risperidone m-tabs 3 mg QHS (psychosis)  	- Start 10/20, inc 10/25  - Aripiprazole 5 mg QHS (psychosis)  	- Start 10/2, inc 10/6, inc 10/9, inc 10/11, inc 10/13, dec 10/20, dec 10/21, dec 10/23  	- Cross-titrating to risperidone due to poor efficacy  - Clonazepam 0.5 mg QHS (insomnia)	  	- Tapering to DC due to continued oversedation  - Trazodone 25mg hs on 10/17    Agitation PRNs: Haloperidol PO/IM, Lorazepam PO/IM, Diphenhydramine PO/IM    4. Medical conditions, other medication, consults  None    5. Psychosocial interventions  - Patient provided verbal consent to speak with mother  - CO 1:1: Not required  - Restrictions/allowances: None

## 2023-10-25 NOTE — BH INPATIENT PSYCHIATRY PROGRESS NOTE - CURRENT MEDICATION
MEDICATIONS  (STANDING):  clonazePAM  Tablet 0.5 milliGRAM(s) Oral at bedtime  risperiDONE  Disintegrating Tablet 3 milliGRAM(s) Oral at bedtime  traZODone 25 milliGRAM(s) Oral at bedtime    MEDICATIONS  (PRN):  acetaminophen     Tablet .. 650 milliGRAM(s) Oral every 6 hours PRN Mild Pain (1 - 3)  artificial  tears Solution 1 Drop(s) Both EYES three times a day PRN dry eyes  diphenhydrAMINE 50 milliGRAM(s) Oral every 6 hours PRN Agitation  diphenhydrAMINE Injectable 50 milliGRAM(s) IntraMuscular once PRN rash/eps/agitation  fluticasone propionate 50 MICROgram(s)/spray Nasal Spray 1 Spray(s) Both Nostrils two times a day PRN nasal congestion  haloperidol    Injectable 5 milliGRAM(s) IntraMuscular Once PRN extreme agitation secondary to psychosis  hydrOXYzine hydrochloride 25 milliGRAM(s) Oral three times a day PRN Anxiety  ibuprofen  Tablet. 400 milliGRAM(s) Oral every 6 hours PRN Mild Pain (1 - 3), Moderate Pain (4 - 6)  LORazepam     Tablet 1 milliGRAM(s) Oral every 6 hours PRN anxiety  LORazepam   Injectable 2 milliGRAM(s) IntraMuscular Once PRN extreme anxiety  melatonin. 5 milliGRAM(s) Oral at bedtime PRN Insomnia  nicotine  Polacrilex Gum 2 milliGRAM(s) Oral every 2 hours PRN cravings

## 2023-10-25 NOTE — BH INPATIENT PSYCHIATRY PROGRESS NOTE - PRN MEDS
MEDICATIONS  (PRN):  acetaminophen     Tablet .. 650 milliGRAM(s) Oral every 6 hours PRN Mild Pain (1 - 3)  artificial  tears Solution 1 Drop(s) Both EYES three times a day PRN dry eyes  diphenhydrAMINE 50 milliGRAM(s) Oral every 6 hours PRN Agitation  diphenhydrAMINE Injectable 50 milliGRAM(s) IntraMuscular once PRN rash/eps/agitation  fluticasone propionate 50 MICROgram(s)/spray Nasal Spray 1 Spray(s) Both Nostrils two times a day PRN nasal congestion  haloperidol    Injectable 5 milliGRAM(s) IntraMuscular Once PRN extreme agitation secondary to psychosis  hydrOXYzine hydrochloride 25 milliGRAM(s) Oral three times a day PRN Anxiety  ibuprofen  Tablet. 400 milliGRAM(s) Oral every 6 hours PRN Mild Pain (1 - 3), Moderate Pain (4 - 6)  LORazepam     Tablet 1 milliGRAM(s) Oral every 6 hours PRN anxiety  LORazepam   Injectable 2 milliGRAM(s) IntraMuscular Once PRN extreme anxiety  melatonin. 5 milliGRAM(s) Oral at bedtime PRN Insomnia  nicotine  Polacrilex Gum 2 milliGRAM(s) Oral every 2 hours PRN cravings

## 2023-10-26 PROCEDURE — 99232 SBSQ HOSP IP/OBS MODERATE 35: CPT

## 2023-10-26 PROCEDURE — 90853 GROUP PSYCHOTHERAPY: CPT

## 2023-10-26 RX ADMIN — Medication 650 MILLIGRAM(S): at 15:31

## 2023-10-26 RX ADMIN — Medication 650 MILLIGRAM(S): at 08:58

## 2023-10-26 RX ADMIN — Medication 1 MILLIGRAM(S): at 15:31

## 2023-10-26 RX ADMIN — RISPERIDONE 3 MILLIGRAM(S): 4 TABLET ORAL at 21:36

## 2023-10-26 RX ADMIN — Medication 25 MILLIGRAM(S): at 21:37

## 2023-10-26 NOTE — BH INPATIENT PSYCHIATRY PROGRESS NOTE - NSBHASSESSSUMMFT_PSY_ALL_CORE
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 Mueller (a psychiatrist for [employer] union to which father belongs), prescribed Prozac 20mg, with some restlessness she noted after taking it vs feeling "sped up", no hx of sa , remote hx of NSSIB early in high-school but none recent, presented to the [facility] ED 2x in one week at the behest of her parents for 2 weeks of acute behavioral change, anxiety, disorganized/difficult to follow speech, poor sleep and passive SI.    On Admit to 1S patient recounts hx consistent with new onset psychotic illness with some decline in function x6mo likely prodromal and onset of acute positive sx (AH, IOR, though seemingly not yet crystalized delusions) in context of recently starting Prozac for depressive and anxious sx (also likely to have been prodromal sx). At this time patient not appearing with manic presentation though will seek further dx clarity with ongoing observation. Of note patient with some BPD traits most prominent when she was in high-school (cutting, abandonment fears), though BPD does not account for current presentation.     Week of 10/9: patient with some improvements in thought disorder, no overt AH at this time, though still quite referential and paranoid at times. Plan to increase Abilify. Was started on Cogentin 1mg after getting Haldol PRN over weekend as there was some concern for EPS though none apparent on exam today. Will order STI testing as per pt request.  Abilify increased to 20mg.  Benadryl added for insomnia.  Week of 10/16: The patient is showing some improvement in symptoms.  Continue to have some paranoia, but getting better.  Tolerating medications well.  More visible on the unit.  Discussed with nursing, will discontinue elopement precautions, as patient is calmer, more involved in treatment, no longer by the door.  Patient with some insomnia, will change Abilify to am and start trazodone 25mg hs.  Abilify in the day caused sedation, will move back to hs.  Week of 10/23: Psychotic symptoms are improving since switching to Risperdal, though she does not believe her experiences were caused by her state of mind. Continuing to cross-titrate Abilify to Risperdal.      1. Admission status  9.39 (Emergency admission)    2. Significant lab findings  - UDS positive for THC    3. Psychotropic medication  - Risperidone m-tabs 3 mg QHS (psychosis)  	- Start 10/20, inc 10/25  - Trazodone 25mg hs on 10/17    Agitation PRNs: Haloperidol PO/IM, Lorazepam PO/IM, Diphenhydramine PO/IM    Discontinued Aripiprazole 5 mg QHS (psychosis)  	- Start 10/2, inc 10/6, inc 10/9, inc 10/11, inc 10/13, dec 10/20, dec 10/21, dec 10/23, DC 10/25  	- Cross-titrating to risperidone due to poor efficacy  Discontinued Clonazepam 0.5 mg QHS (insomnia)	  	- Tapering to DC due to continued oversedation    4. Medical conditions, other medication, consults  None    5. Psychosocial interventions  - Patient provided verbal consent to speak with mother  - CO 1:1: Not required  - Restrictions/allowances: None

## 2023-10-26 NOTE — BH INPATIENT PSYCHIATRY PROGRESS NOTE - CURRENT MEDICATION
MEDICATIONS  (STANDING):  risperiDONE  Disintegrating Tablet 3 milliGRAM(s) Oral at bedtime  traZODone 25 milliGRAM(s) Oral at bedtime    MEDICATIONS  (PRN):  acetaminophen     Tablet .. 650 milliGRAM(s) Oral every 6 hours PRN Mild Pain (1 - 3)  artificial  tears Solution 1 Drop(s) Both EYES three times a day PRN dry eyes  diphenhydrAMINE 50 milliGRAM(s) Oral every 6 hours PRN Agitation  diphenhydrAMINE Injectable 50 milliGRAM(s) IntraMuscular once PRN rash/eps/agitation  fluticasone propionate 50 MICROgram(s)/spray Nasal Spray 1 Spray(s) Both Nostrils two times a day PRN nasal congestion  haloperidol    Injectable 5 milliGRAM(s) IntraMuscular Once PRN extreme agitation secondary to psychosis  hydrOXYzine hydrochloride 25 milliGRAM(s) Oral three times a day PRN Anxiety  ibuprofen  Tablet. 400 milliGRAM(s) Oral every 6 hours PRN Mild Pain (1 - 3), Moderate Pain (4 - 6)  LORazepam     Tablet 1 milliGRAM(s) Oral every 6 hours PRN anxiety  LORazepam   Injectable 2 milliGRAM(s) IntraMuscular Once PRN extreme anxiety  melatonin. 5 milliGRAM(s) Oral at bedtime PRN Insomnia  nicotine  Polacrilex Gum 2 milliGRAM(s) Oral every 2 hours PRN cravings

## 2023-10-27 PROCEDURE — 99232 SBSQ HOSP IP/OBS MODERATE 35: CPT

## 2023-10-27 PROCEDURE — 90853 GROUP PSYCHOTHERAPY: CPT

## 2023-10-27 RX ADMIN — Medication 25 MILLIGRAM(S): at 20:37

## 2023-10-27 RX ADMIN — Medication 650 MILLIGRAM(S): at 18:14

## 2023-10-27 RX ADMIN — RISPERIDONE 3 MILLIGRAM(S): 4 TABLET ORAL at 20:37

## 2023-10-27 RX ADMIN — Medication 2 MILLIGRAM(S): at 13:33

## 2023-10-27 NOTE — BH TREATMENT PLAN - NSTXIMPULSGOAL_PSY_ALL_CORE
Will be able to describe/demonstrate alternative ways of managing his/her emotional state on the unit

## 2023-10-27 NOTE — BH INPATIENT PSYCHIATRY PROGRESS NOTE - NSBHASSESSSUMMFT_PSY_ALL_CORE
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 Mueller (a psychiatrist for [employer] union to which father belongs), prescribed Prozac 20mg, with some restlessness she noted after taking it vs feeling "sped up", no hx of sa , remote hx of NSSIB early in high-school but none recent, presented to the [facility] ED 2x in one week at the behest of her parents for 2 weeks of acute behavioral change, anxiety, disorganized/difficult to follow speech, poor sleep and passive SI.    On Admit to 1S patient recounts hx consistent with new onset psychotic illness with some decline in function x6mo likely prodromal and onset of acute positive sx (AH, IOR, though seemingly not yet crystalized delusions) in context of recently starting Prozac for depressive and anxious sx (also likely to have been prodromal sx). At this time patient not appearing with manic presentation though will seek further dx clarity with ongoing observation. Of note patient with some BPD traits most prominent when she was in high-school (cutting, abandonment fears), though BPD does not account for current presentation.     Week of 10/9: patient with some improvements in thought disorder, no overt AH at this time, though still quite referential and paranoid at times. Plan to increase Abilify. Was started on Cogentin 1mg after getting Haldol PRN over weekend as there was some concern for EPS though none apparent on exam today. Will order STI testing as per pt request.  Abilify increased to 20mg.  Benadryl added for insomnia.  Week of 10/16: The patient is showing some improvement in symptoms.  Continue to have some paranoia, but getting better.  Tolerating medications well.  More visible on the unit.  Discussed with nursing, will discontinue elopement precautions, as patient is calmer, more involved in treatment, no longer by the door.  Patient with some insomnia, will change Abilify to am and start trazodone 25mg hs.  Abilify in the day caused sedation, will move back to hs.  Week of 10/23: Psychotic symptoms are improving since switching to Risperdal, though she does not believe her experiences were caused by her state of mind. Continuing to cross-titrate Abilify to Risperdal.      1. Admission status  9.39 (Emergency admission)    2. Significant lab findings  - UDS positive for THC    3. Psychotropic medication  - Risperidone m-tabs 3 mg QHS (psychosis)  	- Start 10/20, inc 10/25  - Trazodone 25mg hs on 10/17    Agitation PRNs: Haloperidol PO/IM, Lorazepam PO/IM, Diphenhydramine PO/IM    Discontinued Aripiprazole 5 mg QHS (psychosis)  	- Start 10/2, inc 10/6, inc 10/9, inc 10/11, inc 10/13, dec 10/20, dec 10/21, dec 10/23, DC 10/25  	- Cross-titrating to risperidone due to poor efficacy  Discontinued Clonazepam 0.5 mg QHS (insomnia)	  	- Tapering to DC due to continued oversedation    4. Medical conditions, other medication, consults  None    5. Psychosocial interventions  - Patient provided verbal consent to speak with mother  - CO 1:1: Not required  - Restrictions/allowances: None

## 2023-10-27 NOTE — BH TREATMENT PLAN - NSPTSTATEDGOAL_PSY_ALL_CORE
"Get better and get back to school"
I want to honestly return back to school.

## 2023-10-27 NOTE — BH TREATMENT PLAN - NSTXIMPULSINTERMD_PSY_ALL_CORE
I/G/M therapy + medication

## 2023-10-27 NOTE — BH TREATMENT PLAN - NSTXPATIENTPARTICIPATE_PSY_ALL_CORE
Patient participated in identification of needs/problems/goals for treatment
Patient participated in identification of needs/problems/goals for treatment/Patient participated in defining interventions/Patient participated in development of after care plan

## 2023-10-27 NOTE — BH TREATMENT PLAN - NSTXCAREGIVERPARTICIPATE_PSY_P_CORE
Family/Caregiver participated in identification of needs/problems/goals for treatment
Family/Caregiver participated in identification of needs/problems/goals for treatment/Family/Caregiver participated in defining interventions/Family/Caregiver participated in development of after care plan

## 2023-10-27 NOTE — BH TREATMENT PLAN - NSTXDCOPLKINTERSW_PSY_ALL_CORE
SW will provide pt with support, discuss aftercare plans, make appropriate referrals for mental health and discuss discharge readiness with team.

## 2023-10-27 NOTE — BH TREATMENT PLAN - NSTXPLANTHERAPYSESSIONSFT_PSY_ALL_CORE
10-20-23  Type of therapy: Dialectical behavior therapy, Coping skills, Creative arts therapy, Mindfulness, Music therapy, Spirituality  Type of session: Individual  Level of patient participation: Engaged  Duration of participation: 20 minutes  Therapy conducted by: Psych rehab  Therapy Summary: Writer met with patient for an individual session in order to review progress towards psychiatric rehabilitation goals. Patient was verbal and cooperative during session. Patient is engaged in unit activities. Patient was not a behavioral management issue during past 7 days.     Patient has attended approximately 95 percent of psychiatric rehabilitation groups over the past seven days. Patient has demonstrated limited improvement in mood. Patient continues to report feelings of paranoia. Patient reports "not liking some staff members", and reports feeling like "they are talking about her and her past". Patient continues to reports she is not "crazy" and she is sure her neighbors and friends talk about her. Writer explored coping skills with patient. Patient reports coping skills such as listening to music and art. Writer encouraged patient to continue to explore, utilize, and strengthen effective and healthy coping skills. Patient was receptive. Patient reports medicaion compliance over the past 7 days. Patient has been visible in the unit and has been appropriate with peers and staff.  
  10-20-23  Type of therapy: Dialectical behavior therapy, Coping skills, Creative arts therapy, Mindfulness, Music therapy, Spirituality  Type of session: Individual  Level of patient participation: Engaged  Duration of participation: 20 minutes  Therapy conducted by: Psych rehab  Therapy Summary: Writer met with patient for an individual session in order to review progress towards psychiatric rehabilitation goals. Patient was verbal and cooperative during session. Patient is engaged in unit activities. Patient was not a behavioral management issue during past 7 days.     Patient has attended approximately 95 percent of psychiatric rehabilitation groups over the past seven days. Patient has demonstrated limited improvement in mood. Patient continues to report feelings of paranoia. Patient reports "not liking some staff members", and reports feeling like "they are talking about her and her past". Patient continues to reports she is not "crazy" and she is sure her neighbors and friends talk about her. Writer explored coping skills with patient. Patient reports coping skills such as listening to music and art. Writer encouraged patient to continue to explore, utilize, and strengthen effective and healthy coping skills. Patient was receptive. Patient reports medicaion compliance over the past 7 days. Patient has been visible in the unit and has been appropriate with peers and staff.

## 2023-10-28 PROCEDURE — 99231 SBSQ HOSP IP/OBS SF/LOW 25: CPT

## 2023-10-28 RX ADMIN — Medication 25 MILLIGRAM(S): at 20:58

## 2023-10-28 RX ADMIN — Medication 650 MILLIGRAM(S): at 17:10

## 2023-10-28 RX ADMIN — RISPERIDONE 3 MILLIGRAM(S): 4 TABLET ORAL at 20:58

## 2023-10-28 RX ADMIN — Medication 650 MILLIGRAM(S): at 16:41

## 2023-10-28 NOTE — BH INPATIENT PSYCHIATRY PROGRESS NOTE - NSBHASSESSSUMMFT_PSY_ALL_CORE
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 Mueller (a psychiatrist for [employer] union to which father belongs), prescribed Prozac 20mg, with some restlessness she noted after taking it vs feeling "sped up", no hx of sa , remote hx of NSSIB early in high-school but none recent, presented to the [facility] ED 2x in one week at the behest of her parents for 2 weeks of acute behavioral change, anxiety, disorganized/difficult to follow speech, poor sleep and passive SI.    On Admit to 1S patient recounts hx consistent with new onset psychotic illness with some decline in function x6mo likely prodromal and onset of acute positive sx (AH, IOR, though seemingly not yet crystalized delusions) in context of recently starting Prozac for depressive and anxious sx (also likely to have been prodromal sx). At this time patient not appearing with manic presentation though will seek further dx clarity with ongoing observation. Of note patient with some BPD traits most prominent when she was in high-school (cutting, abandonment fears), though BPD does not account for current presentation.     Week of 10/9: patient with some improvements in thought disorder, no overt AH at this time, though still quite referential and paranoid at times. Plan to increase Abilify. Was started on Cogentin 1mg after getting Haldol PRN over weekend as there was some concern for EPS though none apparent on exam today. Will order STI testing as per pt request.  Abilify increased to 20mg.  Benadryl added for insomnia.  Week of 10/16: The patient is showing some improvement in symptoms.  Continue to have some paranoia, but getting better.  Tolerating medications well.  More visible on the unit.  Discussed with nursing, will discontinue elopement precautions, as patient is calmer, more involved in treatment, no longer by the door.  Patient with some insomnia, will change Abilify to am and start trazodone 25mg hs.  Abilify in the day caused sedation, will move back to hs.  Week of 10/23: Psychotic symptoms are improving since switching to Risperdal, though she does not believe her experiences were caused by her state of mind. Continuing to cross-titrate Abilify to Risperdal.    10/28  Continued slow clinical improvement on risperidone trial  1. Admission status  9.39 (Emergency admission)    2. Significant lab findings  - UDS positive for THC    3. Psychotropic medication  - Risperidone m-tabs 3 mg QHS (psychosis)  	- Start 10/20, inc 10/25  - Trazodone 25mg hs on 10/17    Agitation PRNs: Haloperidol PO/IM, Lorazepam PO/IM, Diphenhydramine PO/IM    Discontinued Aripiprazole 5 mg QHS (psychosis)  	- Start 10/2, inc 10/6, inc 10/9, inc 10/11, inc 10/13, dec 10/20, dec 10/21, dec 10/23, DC 10/25  	- Cross-titrating to risperidone due to poor efficacy  Discontinued Clonazepam 0.5 mg QHS (insomnia)	  	- Tapering to DC due to continued oversedation    4. Medical conditions, other medication, consults  None    5. Psychosocial interventions  - Patient provided verbal consent to speak with mother  - CO 1:1: Not required  - Restrictions/allowances: None

## 2023-10-29 LAB
APPEARANCE UR: CLEAR — SIGNIFICANT CHANGE UP
APPEARANCE UR: CLEAR — SIGNIFICANT CHANGE UP
BILIRUB UR-MCNC: NEGATIVE — SIGNIFICANT CHANGE UP
BILIRUB UR-MCNC: NEGATIVE — SIGNIFICANT CHANGE UP
COLOR SPEC: YELLOW — SIGNIFICANT CHANGE UP
COLOR SPEC: YELLOW — SIGNIFICANT CHANGE UP
DIFF PNL FLD: NEGATIVE — SIGNIFICANT CHANGE UP
DIFF PNL FLD: NEGATIVE — SIGNIFICANT CHANGE UP
GLUCOSE UR QL: NEGATIVE MG/DL — SIGNIFICANT CHANGE UP
GLUCOSE UR QL: NEGATIVE MG/DL — SIGNIFICANT CHANGE UP
KETONES UR-MCNC: NEGATIVE MG/DL — SIGNIFICANT CHANGE UP
KETONES UR-MCNC: NEGATIVE MG/DL — SIGNIFICANT CHANGE UP
LEUKOCYTE ESTERASE UR-ACNC: NEGATIVE — SIGNIFICANT CHANGE UP
LEUKOCYTE ESTERASE UR-ACNC: NEGATIVE — SIGNIFICANT CHANGE UP
NITRITE UR-MCNC: NEGATIVE — SIGNIFICANT CHANGE UP
NITRITE UR-MCNC: NEGATIVE — SIGNIFICANT CHANGE UP
PH UR: 6 — SIGNIFICANT CHANGE UP (ref 5–8)
PH UR: 6 — SIGNIFICANT CHANGE UP (ref 5–8)
PROT UR-MCNC: NEGATIVE MG/DL — SIGNIFICANT CHANGE UP
PROT UR-MCNC: NEGATIVE MG/DL — SIGNIFICANT CHANGE UP
SP GR SPEC: 1.02 — SIGNIFICANT CHANGE UP (ref 1–1.03)
SP GR SPEC: 1.02 — SIGNIFICANT CHANGE UP (ref 1–1.03)
UROBILINOGEN FLD QL: 0.2 MG/DL — SIGNIFICANT CHANGE UP (ref 0.2–1)
UROBILINOGEN FLD QL: 0.2 MG/DL — SIGNIFICANT CHANGE UP (ref 0.2–1)

## 2023-10-29 RX ORDER — RISPERIDONE 4 MG/1
4 TABLET ORAL AT BEDTIME
Refills: 0 | Status: DISCONTINUED | OUTPATIENT
Start: 2023-10-29 | End: 2023-11-03

## 2023-10-29 RX ORDER — TRAZODONE HCL 50 MG
25 TABLET ORAL AT BEDTIME
Refills: 0 | Status: DISCONTINUED | OUTPATIENT
Start: 2023-10-29 | End: 2023-11-03

## 2023-10-29 RX ADMIN — Medication 2 MILLIGRAM(S): at 20:22

## 2023-10-29 RX ADMIN — RISPERIDONE 4 MILLIGRAM(S): 4 TABLET ORAL at 20:18

## 2023-10-29 RX ADMIN — Medication 2 MILLIGRAM(S): at 09:37

## 2023-10-29 NOTE — BH CHART NOTE - NSEVENTNOTEFT_PSY_ALL_CORE
Interval History:  BRUNA called to evaluate patient for vaginal discharge. Patient reports presenting grey and fouls smelling vaginal discharge for the last 3-4 days. Patient reports vaginal discharge has increased   Pt denies chest pain, SOB, or edema. Denies headache, visual changes, confusion, or n/v.       T(C): 36.4 (10-29-23 @ 08:13), Max: 36.6 (10-28-23 @ 20:16)  HR: 18 (10-29-23 @ 08:13) (18 - 18)  BP: --  RR: --  SpO2: --    Physical Exam:  Gen: Patient sitting on hospital bed, NAD   HEENT: NC/AT,  EOMI.    Resp: CTA b/l. No wheezes, ronchi, or crackles.   CV: Radial pulses 2+ b/l, RRR, no murmurs.   Abd: soft, NTND, no guarding or rigidity. NABS.    Ext: ROM intact, no clubbing, edema, or cyanosis.   Neuro: awake, alert, grossly oriented.     Assessment:  BRUNA called for [incident]. Pt clinically stable with exam otherwise unremarkable.     Plan:  1. no further medical intervention necessary at this time.   2. will continue to monitor routinely.   3. d/w RN staff    Interval History:  BRUNA called to evaluate patient for vaginal discharge. Patient reports presenting grey and fouls smelling vaginal discharge for the last 3-4 days. Patient reports vaginal discharge has increased since yesterday. Patient denies any dysuria, vesical tenesmus, or increased urinary frequency   Pt denies chest pain, SOB, or edema. Denies headache, visual changes, confusion, or n/v.       T(C): 36.4 (10-29-23 @ 08:13), Max: 36.6 (10-28-23 @ 20:16)  HR: 18 (10-29-23 @ 08:13) (18 - 18)  BP: --  RR: --  SpO2: --    Physical Exam:  Gen: Patient sitting on hospital bed, NAD   HEENT: NC/AT,  EOMI.    Resp: CTA b/l. No wheezes, ronchi, or crackles.   CV: Radial pulses 2+ b/l, RRR, no murmurs.   Abd: soft, NTND, no guarding or rigidity. NABS.    Ext: ROM intact, no clubbing, edema, or cyanosis.   Neuro: awake, alert, grossly oriented.     Assessment:  BRUNA called for [incident]. Pt clinically stable with exam otherwise unremarkable.     Plan:  1. no further medical intervention necessary at this time.   2. will continue to monitor routinely.   3. d/w RN staff    Interval History:  BRUNA called to evaluate patient for vaginal discharge. Patient reports presenting grey and fouls smelling vaginal discharge for the last 3-4 days. Patient reports vaginal discharge has increased since yesterday. Patient denies any dysuria, vesical tenesmus, or increased urinary frequency. She denies any fever or chills but endorses some lower abdominal pain but is vague while providing details. Patient states "now that you mention maybe I have an abdominal pain", but is unable to provide further description on the pain including onset or intensity. She reports taking Tylenol which has helped. Patient  Pt denies chest pain, SOB, or edema. Denies headache, visual changes, confusion, or n/v.       T(C): 36.4 (10-29-23 @ 08:13), Max: 36.6 (10-28-23 @ 20:16)  HR: 18 (10-29-23 @ 08:13) (18 - 18)  BP: --  RR: --  SpO2: --    Physical Exam:  Gen: Patient sitting on hospital bed, NAD   HEENT: NC/AT,  EOMI.    Resp: CTA b/l. No wheezes, ronchi, or crackles.   CV: Radial pulses 2+ b/l, RRR, no murmurs.   Abd: soft, NTND, no guarding or rigidity. NABS.    Ext: ROM intact, no clubbing, edema, or cyanosis.   Neuro: awake, alert, grossly oriented.   GYN: unable to assess.     Assessment:  BRUNA called for vaginal discharge which started 4 days ago but has worsened since yesterday. Patient reports grey and foul smelling vaginal discharge. Denies other urinary sxs besides a vague lower abdominal pain. Concerning for bacterial vaginosis. Pt clinically stable with exam otherwise unremarkable. Offered to patient going to the ED for further gynecological exam but refused and states preference to be assessed in the AM. Ordered UA and culture for now.     Plan:  1. Patient refused to go to the ED and no acute sxs or signs that require emergent treatment at this moment.   2. Ordered UA and culture. Primary team to follow.   3. d/w RN staff

## 2023-10-29 NOTE — BH INPATIENT PSYCHIATRY PROGRESS NOTE - NSBHASSESSSUMMFT_PSY_ALL_CORE
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 Mueller (a psychiatrist for [employer] to which father belongs), prescribed Prozac 20mg, with some restlessness she noted after taking it vs feeling "sped up", no hx of sa , remote hx of NSSIB early in high-school but none recent, presented to the [facility] ED 2x in one week at the behest of her parents for 2 weeks of acute behavioral change, anxiety, disorganized/difficult to follow speech, poor sleep and passive SI.    On Admit to 1S patient recounts hx consistent with new onset psychotic illness with some decline in function x6mo likely prodromal and onset of acute positive sx (AH, IOR, though seemingly not yet crystalized delusions) in context of recently starting Prozac for depressive and anxious sx (also likely to have been prodromal sx). At this time patient not appearing with manic presentation though will seek further dx clarity with ongoing observation. Of note patient with some BPD traits most prominent when she was in high-school (cutting, abandonment fears), though BPD does not account for current presentation.     Week of 10/9: patient with some improvements in thought disorder, no overt AH at this time, though still quite referential and paranoid at times. Plan to increase Abilify. Was started on Cogentin 1mg after getting Haldol PRN over weekend as there was some concern for EPS though none apparent on exam today. Will order STI testing as per pt request.  Abilify increased to 20mg.  Benadryl added for insomnia.  Week of 10/16: The patient is showing some improvement in symptoms.  Continue to have some paranoia, but getting better.  Tolerating medications well.  More visible on the unit.  Discussed with nursing, will discontinue elopement precautions, as patient is calmer, more involved in treatment, no longer by the door.  Patient with some insomnia, will change Abilify to am and start trazodone 25mg hs.  Abilify in the day caused sedation, will move back to hs.  Week of 10/23: Psychotic symptoms are improving since switching to Risperdal, though she does not believe her experiences were caused by her state of mind. Continuing to cross-titrate Abilify to Risperdal.    10/29  Continued slow clinical improvement on risperidone trial  asking to bee on 1 med  Discussed increasing risperidone to 4 mg and changing trazodone to PRN  1. Admission status  9.39 (Emergency admission)    2. Significant lab findings  - UDS positive for THC    3. Psychotropic medication  - Risperidone m-tabs 4 mg QHS (psychosis)  	- Start 10/20, inc 10/25, 10/29  - Trazodone 25mg hs on 1017/ changed to PRN 10/29    Agitation PRNs: Haloperidol PO/IM, Lorazepam PO/IM, Diphenhydramine PO/IM    Discontinued Aripiprazole 5 mg QHS (psychosis)  	- Start 10/2, inc 10/6, inc 10/9, inc 10/11, inc 10/13, dec 10/20, dec 10/21, dec 10/23, DC 10/25  	- Cross-titrating to risperidone due to poor efficacy  Discontinued Clonazepam 0.5 mg QHS (insomnia)	  	- Tapering to DC due to continued oversedation    4. Medical conditions, other medication, consults  None    5. Psychosocial interventions  - Patient provided verbal consent to speak with mother  - CO 1:1: Not required  - Restrictions/allowances: None

## 2023-10-30 PROCEDURE — 99223 1ST HOSP IP/OBS HIGH 75: CPT

## 2023-10-30 PROCEDURE — 99232 SBSQ HOSP IP/OBS MODERATE 35: CPT

## 2023-10-30 RX ORDER — FLUCONAZOLE 150 MG/1
150 TABLET ORAL ONCE
Refills: 0 | Status: COMPLETED | OUTPATIENT
Start: 2023-10-30 | End: 2023-10-30

## 2023-10-30 RX ORDER — METRONIDAZOLE 500 MG
500 TABLET ORAL
Refills: 0 | Status: DISCONTINUED | OUTPATIENT
Start: 2023-10-30 | End: 2023-11-03

## 2023-10-30 RX ADMIN — Medication 2 MILLIGRAM(S): at 20:33

## 2023-10-30 RX ADMIN — Medication 500 MILLIGRAM(S): at 21:09

## 2023-10-30 RX ADMIN — Medication 2 MILLIGRAM(S): at 15:54

## 2023-10-30 RX ADMIN — RISPERIDONE 4 MILLIGRAM(S): 4 TABLET ORAL at 20:32

## 2023-10-30 RX ADMIN — FLUCONAZOLE 150 MILLIGRAM(S): 150 TABLET ORAL at 15:53

## 2023-10-30 NOTE — CONSULT NOTE ADULT - ASSESSMENT
23F with psychosis admitted to [facility] for management. Med c/s for vaginal dc. DDx includes BV, trichomonas, v. less likely vaginal candidiasis.    #BV v. Trich  -Empirically tx w/ flagyl 500 BID x7d. Start after urine studies are sent.  -Will also empirically tx w/ fluconazole 200 PO x1 to cover for incompletely tx yeast infection.  -HIV neg. GC urine study was negative.  -Send trichomonas and chlamydia urine amplication test.  -If no improvement - will reassess and exam as discuss w/ pt.    #Psychosis/depression  -As per primary team.    Pls call w/ any questions. Discussed w/ Dr Johnston.

## 2023-10-30 NOTE — PHARMACOTHERAPY INTERVENTION NOTE - COMMENTS
Patient ordered for fluconazole 200 mG x1 dose for vaginal candida. Recommended changing dose to 150 mG x1 dose as recommended for vaginal yeast infection. Provider agreed to change, and order was changed with authorization from provider to 150 mG.

## 2023-10-30 NOTE — BH INPATIENT PSYCHIATRY PROGRESS NOTE - CURRENT MEDICATION
MEDICATIONS  (STANDING):  fluconAZOLE   Tablet 150 milliGRAM(s) Oral once  metroNIDAZOLE    Tablet 500 milliGRAM(s) Oral two times a day  risperiDONE  Disintegrating Tablet 4 milliGRAM(s) Oral at bedtime    MEDICATIONS  (PRN):  acetaminophen     Tablet .. 650 milliGRAM(s) Oral every 6 hours PRN Mild Pain (1 - 3)  artificial  tears Solution 1 Drop(s) Both EYES three times a day PRN dry eyes  diphenhydrAMINE 50 milliGRAM(s) Oral every 6 hours PRN Agitation  diphenhydrAMINE Injectable 50 milliGRAM(s) IntraMuscular once PRN rash/eps/agitation  fluticasone propionate 50 MICROgram(s)/spray Nasal Spray 1 Spray(s) Both Nostrils two times a day PRN nasal congestion  haloperidol    Injectable 5 milliGRAM(s) IntraMuscular Once PRN extreme agitation secondary to psychosis  hydrOXYzine hydrochloride 25 milliGRAM(s) Oral three times a day PRN Anxiety  ibuprofen  Tablet. 400 milliGRAM(s) Oral every 6 hours PRN Mild Pain (1 - 3), Moderate Pain (4 - 6)  LORazepam     Tablet 1 milliGRAM(s) Oral every 6 hours PRN anxiety  LORazepam   Injectable 2 milliGRAM(s) IntraMuscular Once PRN extreme anxiety  melatonin. 5 milliGRAM(s) Oral at bedtime PRN Insomnia  nicotine  Polacrilex Gum 2 milliGRAM(s) Oral every 2 hours PRN cravings  traZODone 25 milliGRAM(s) Oral at bedtime PRN insomnia

## 2023-10-30 NOTE — BH INPATIENT PSYCHIATRY PROGRESS NOTE - NSBHASSESSSUMMFT_PSY_ALL_CORE
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 Mueller (a psychiatrist for [employer] to which father belongs), prescribed Prozac 20mg, with some restlessness she noted after taking it vs feeling "sped up", no hx of sa , remote hx of NSSIB early in high-school but none recent, presented to the [facility] ED 2x in one week at the behest of her parents for 2 weeks of acute behavioral change, anxiety, disorganized/difficult to follow speech, poor sleep and passive SI.    On Admit to 1S patient recounts hx consistent with new onset psychotic illness with some decline in function x6mo likely prodromal and onset of acute positive sx (AH, IOR, though seemingly not yet crystalized delusions) in context of recently starting Prozac for depressive and anxious sx (also likely to have been prodromal sx). At this time patient not appearing with manic presentation though will seek further dx clarity with ongoing observation. Of note patient with some BPD traits most prominent when she was in high-school (cutting, abandonment fears), though BPD does not account for current presentation.     Week of 10/9: patient with some improvements in thought disorder, no overt AH at this time, though still quite referential and paranoid at times. Plan to increase Abilify. Was started on Cogentin 1mg after getting Haldol PRN over weekend as there was some concern for EPS though none apparent on exam today. Will order STI testing as per pt request.  Abilify increased to 20mg.  Benadryl added for insomnia.  Week of 10/16: The patient is showing some improvement in symptoms.  Continue to have some paranoia, but getting better.  Tolerating medications well.  More visible on the unit.  Discussed with nursing, will discontinue elopement precautions, as patient is calmer, more involved in treatment, no longer by the door.  Patient with some insomnia, will change Abilify to am and start trazodone 25mg hs.  Abilify in the day caused sedation, will move back to hs.  Week of 10/23: Psychotic symptoms are improving since switching to Risperdal, though she does not believe her experiences were caused by her state of mind. Continuing to cross-titrate Abilify to Risperdal.    1. Admission status  9.39 (Emergency admission)    2. Significant lab findings  - UDS positive for THC    3. Psychotropic medication  - Risperidone m-tabs 4 mg QHS (psychosis)  	- Start 10/20, inc 10/25, 10/29  - Trazodone 25mg hs on 1017/ changed to PRN 10/29    Agitation PRNs: Haloperidol PO/IM, Lorazepam PO/IM, Diphenhydramine PO/IM    Discontinued Aripiprazole 5 mg QHS (psychosis)  	- Start 10/2, inc 10/6, inc 10/9, inc 10/11, inc 10/13, dec 10/20, dec 10/21, dec 10/23, DC 10/25  	- Cross-titrating to risperidone due to poor efficacy  Discontinued Clonazepam 0.5 mg QHS (insomnia)	  	- Tapering to DC due to continued oversedation    4. Medical conditions, other medication, consults  A) Vaginal discharge  - Metronidazole and fluconazole per hospitalist    5. Psychosocial interventions  - Patient provided verbal consent to speak with mother  - CO 1:1: Not required  - Restrictions/allowances: None

## 2023-10-30 NOTE — CONSULT NOTE ADULT - SUBJECTIVE AND OBJECTIVE BOX
HPI:   23F with no significant PMH who presents to [facility] for tx of depression/psychosis. Medicine was c/s for vagnial DC. Pt states her sx started about 1-2 weeks ago. While at home - she used some homeopathic remedy and also monistat cream. She reported sx improving, but she never finished her course of monistat. She reports symptoms getting worse since Saturday, which prompting this evaluation. Pt denies any fevers, chills, burning w/ urination. She reports intermittent condom use and last unprotected sex was about 5-6 weeks ago. Pt denied this happening previously. Her discharge was characterized as grayish and foul smelling. She does not report any other genital lesions. No prior STIs.    PAST MEDICAL & SURGICAL HISTORY:  Flexural eczema      No significant past surgical history          Review of Systems:   CONSTITUTIONAL: No fever, weight loss, or fatigue  EYES: No eye pain, visual disturbances, or discharge  ENMT:  No difficulty hearing, tinnitus, vertigo; No sinus or throat pain  NECK: No pain or stiffness  RESPIRATORY: No cough, wheezing, chills or hemoptysis; No shortness of breath  CARDIOVASCULAR: No chest pain, palpitations, dizziness, or leg swelling  GASTROINTESTINAL: No abdominal or epigastric pain. No nausea, vomiting, or hematemesis; No diarrhea or constipation. No melena or hematochezia.  GENITOURINARY: No dysuria, frequency, hematuria, or incontinence  NEUROLOGICAL: No headaches, memory loss, loss of strength, numbness, or tremors  SKIN: No itching, burning, rashes, or lesions   LYMPH NODES: No enlarged glands  ENDOCRINE: No heat or cold intolerance; No hair loss  MUSCULOSKELETAL: No joint pain or swelling; No muscle, back, or extremity pain  HEME/LYMPH: No easy bruising, or bleeding gums  ALLERY AND IMMUNOLOGIC: No hives or eczema    Allergies    No Known Allergies    Intolerances        Social History:   Reviewed notes.  +drug use, specifics unknown.  Currently a student.    FAMILY HISTORY:  Mental health issues.      MEDICATIONS  (STANDING):  risperiDONE  Disintegrating Tablet 4 milliGRAM(s) Oral at bedtime    MEDICATIONS  (PRN):  acetaminophen     Tablet .. 650 milliGRAM(s) Oral every 6 hours PRN Mild Pain (1 - 3)  artificial  tears Solution 1 Drop(s) Both EYES three times a day PRN dry eyes  diphenhydrAMINE 50 milliGRAM(s) Oral every 6 hours PRN Agitation  diphenhydrAMINE Injectable 50 milliGRAM(s) IntraMuscular once PRN rash/eps/agitation  fluticasone propionate 50 MICROgram(s)/spray Nasal Spray 1 Spray(s) Both Nostrils two times a day PRN nasal congestion  haloperidol    Injectable 5 milliGRAM(s) IntraMuscular Once PRN extreme agitation secondary to psychosis  hydrOXYzine hydrochloride 25 milliGRAM(s) Oral three times a day PRN Anxiety  ibuprofen  Tablet. 400 milliGRAM(s) Oral every 6 hours PRN Mild Pain (1 - 3), Moderate Pain (4 - 6)  LORazepam     Tablet 1 milliGRAM(s) Oral every 6 hours PRN anxiety  LORazepam   Injectable 2 milliGRAM(s) IntraMuscular Once PRN extreme anxiety  melatonin. 5 milliGRAM(s) Oral at bedtime PRN Insomnia  nicotine  Polacrilex Gum 2 milliGRAM(s) Oral every 2 hours PRN cravings  traZODone 25 milliGRAM(s) Oral at bedtime PRN insomnia      Vital Signs Last 24 Hrs  T(C): 36.6 (30 Oct 2023 07:57), Max: 36.6 (30 Oct 2023 07:57)  T(F): 97.8 (30 Oct 2023 07:57), Max: 97.8 (30 Oct 2023 07:57)  HR: 20 (30 Oct 2023 07:57) (20 - 20)  BP: 115/75 (30 Oct 2023 07:57) (115/75 - 115/75)  BP(mean): --  RR: 20 (30 Oct 2023 07:57) (20 - 20)  SpO2: --      CAPILLARY BLOOD GLUCOSE      PHYSICAL EXAM:  GENERAL: NAD, well-groomed  HEAD:  Atraumatic, Normocephalic  EYES: EOMI, conjunctiva and sclera clear  NEUROLOGY: CN II-XII intact. Speech fluent/face symmetric.  SKIN: No rashes or lesions  : Exam deferred per pt preference.    LABS:  Reviewed.    Urinalysis Basic - ( 29 Oct 2023 22:33 )    Color: Yellow / Appearance: Clear / S.022 / pH: x  Gluc: x / Ketone: Negative mg/dL  / Bili: Negative / Urobili: 0.2 mg/dL   Blood: x / Protein: Negative mg/dL / Nitrite: Negative   Leuk Esterase: Negative / RBC: x / WBC x   Sq Epi: x / Non Sq Epi: x / Bacteria: x        EKG(personally reviewed): NSr. No acute ST/T changes. QTc 458.    RADIOLOGY & ADDITIONAL TESTS:  None.

## 2023-10-30 NOTE — BH INPATIENT PSYCHIATRY PROGRESS NOTE - PRN MEDS
MEDICATIONS  (PRN):  acetaminophen     Tablet .. 650 milliGRAM(s) Oral every 6 hours PRN Mild Pain (1 - 3)  artificial  tears Solution 1 Drop(s) Both EYES three times a day PRN dry eyes  diphenhydrAMINE 50 milliGRAM(s) Oral every 6 hours PRN Agitation  diphenhydrAMINE Injectable 50 milliGRAM(s) IntraMuscular once PRN rash/eps/agitation  fluticasone propionate 50 MICROgram(s)/spray Nasal Spray 1 Spray(s) Both Nostrils two times a day PRN nasal congestion  haloperidol    Injectable 5 milliGRAM(s) IntraMuscular Once PRN extreme agitation secondary to psychosis  hydrOXYzine hydrochloride 25 milliGRAM(s) Oral three times a day PRN Anxiety  ibuprofen  Tablet. 400 milliGRAM(s) Oral every 6 hours PRN Mild Pain (1 - 3), Moderate Pain (4 - 6)  LORazepam     Tablet 1 milliGRAM(s) Oral every 6 hours PRN anxiety  LORazepam   Injectable 2 milliGRAM(s) IntraMuscular Once PRN extreme anxiety  melatonin. 5 milliGRAM(s) Oral at bedtime PRN Insomnia  nicotine  Polacrilex Gum 2 milliGRAM(s) Oral every 2 hours PRN cravings  traZODone 25 milliGRAM(s) Oral at bedtime PRN insomnia

## 2023-10-31 LAB
C TRACH RRNA SPEC QL NAA+PROBE: SIGNIFICANT CHANGE UP
C TRACH RRNA SPEC QL NAA+PROBE: SIGNIFICANT CHANGE UP
CULTURE RESULTS: SIGNIFICANT CHANGE UP
CULTURE RESULTS: SIGNIFICANT CHANGE UP
N GONORRHOEA RRNA SPEC QL NAA+PROBE: SIGNIFICANT CHANGE UP
N GONORRHOEA RRNA SPEC QL NAA+PROBE: SIGNIFICANT CHANGE UP
SPECIMEN SOURCE: SIGNIFICANT CHANGE UP
T VAGINALIS RRNA SPEC QL NAA+PROBE: SIGNIFICANT CHANGE UP
T VAGINALIS RRNA SPEC QL NAA+PROBE: SIGNIFICANT CHANGE UP

## 2023-10-31 PROCEDURE — 90853 GROUP PSYCHOTHERAPY: CPT

## 2023-10-31 PROCEDURE — 99232 SBSQ HOSP IP/OBS MODERATE 35: CPT

## 2023-10-31 RX ADMIN — Medication 650 MILLIGRAM(S): at 10:59

## 2023-10-31 RX ADMIN — Medication 650 MILLIGRAM(S): at 12:35

## 2023-10-31 RX ADMIN — Medication 2 MILLIGRAM(S): at 09:03

## 2023-10-31 RX ADMIN — Medication 400 MILLIGRAM(S): at 15:30

## 2023-10-31 RX ADMIN — RISPERIDONE 4 MILLIGRAM(S): 4 TABLET ORAL at 20:35

## 2023-10-31 RX ADMIN — Medication 500 MILLIGRAM(S): at 20:35

## 2023-10-31 RX ADMIN — Medication 400 MILLIGRAM(S): at 16:13

## 2023-10-31 RX ADMIN — Medication 500 MILLIGRAM(S): at 08:53

## 2023-10-31 RX ADMIN — Medication 5 MILLIGRAM(S): at 21:20

## 2023-10-31 NOTE — BH INPATIENT PSYCHIATRY PROGRESS NOTE - CURRENT MEDICATION
MEDICATIONS  (STANDING):  metroNIDAZOLE    Tablet 500 milliGRAM(s) Oral two times a day  risperiDONE  Disintegrating Tablet 4 milliGRAM(s) Oral at bedtime    MEDICATIONS  (PRN):  acetaminophen     Tablet .. 650 milliGRAM(s) Oral every 6 hours PRN Mild Pain (1 - 3)  artificial  tears Solution 1 Drop(s) Both EYES three times a day PRN dry eyes  diphenhydrAMINE 50 milliGRAM(s) Oral every 6 hours PRN Agitation  diphenhydrAMINE Injectable 50 milliGRAM(s) IntraMuscular once PRN rash/eps/agitation  fluticasone propionate 50 MICROgram(s)/spray Nasal Spray 1 Spray(s) Both Nostrils two times a day PRN nasal congestion  haloperidol    Injectable 5 milliGRAM(s) IntraMuscular Once PRN extreme agitation secondary to psychosis  hydrOXYzine hydrochloride 25 milliGRAM(s) Oral three times a day PRN Anxiety  ibuprofen  Tablet. 400 milliGRAM(s) Oral every 6 hours PRN Mild Pain (1 - 3), Moderate Pain (4 - 6)  LORazepam     Tablet 1 milliGRAM(s) Oral every 6 hours PRN anxiety  LORazepam   Injectable 2 milliGRAM(s) IntraMuscular Once PRN extreme anxiety  melatonin. 5 milliGRAM(s) Oral at bedtime PRN Insomnia  nicotine  Polacrilex Gum 2 milliGRAM(s) Oral every 2 hours PRN cravings  traZODone 25 milliGRAM(s) Oral at bedtime PRN insomnia

## 2023-10-31 NOTE — BH PSYCHOLOGY - GROUP THERAPY NOTE - NSPSYCHOLGRPDBTINT_PSY_A_CORE FT
taught distress tolerance skill 
taught mindfulness skill 
taught distress tolerance skill 
taught emotion regulation skill 
taught distress tolerance skill 
taught emotion regulation skill 
taught distress tolerance skill 
taught emotion regulation skill 
taught emotion regulation skill

## 2023-10-31 NOTE — BH INPATIENT PSYCHIATRY PROGRESS NOTE - NSBHASSESSSUMMFT_PSY_ALL_CORE
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 Mueller (a psychiatrist for [employer] to which father belongs), prescribed Prozac 20mg, with some restlessness she noted after taking it vs feeling "sped up", no hx of sa , remote hx of NSSIB early in high-school but none recent, presented to the [facility] ED 2x in one week at the behest of her parents for 2 weeks of acute behavioral change, anxiety, disorganized/difficult to follow speech, poor sleep and passive SI.    On Admit to 1S patient recounts hx consistent with new onset psychotic illness with some decline in function x6mo likely prodromal and onset of acute positive sx (AH, IOR, though seemingly not yet crystalized delusions) in context of recently starting Prozac for depressive and anxious sx (also likely to have been prodromal sx). At this time patient not appearing with manic presentation though will seek further dx clarity with ongoing observation. Of note patient with some BPD traits most prominent when she was in high-school (cutting, abandonment fears), though BPD does not account for current presentation.     Week of 10/9: patient with some improvements in thought disorder, no overt AH at this time, though still quite referential and paranoid at times. Plan to increase Abilify. Was started on Cogentin 1mg after getting Haldol PRN over weekend as there was some concern for EPS though none apparent on exam today. Will order STI testing as per pt request.  Abilify increased to 20mg.  Benadryl added for insomnia.  Week of 10/16: The patient is showing some improvement in symptoms.  Continue to have some paranoia, but getting better.  Tolerating medications well.  More visible on the unit.  Discussed with nursing, will discontinue elopement precautions, as patient is calmer, more involved in treatment, no longer by the door.  Patient with some insomnia, will change Abilify to am and start trazodone 25mg hs.  Abilify in the day caused sedation, will move back to hs.  Week of 10/23: Psychotic symptoms are improving since switching to Risperdal, though she does not believe her experiences were caused by her state of mind. Continuing to cross-titrate Abilify to Risperdal.  Week of 10/30: Patient is demonstrating significant improvement in psychosis. She complains of some dizziness/lightheadedness; we will continue to monitor.     1. Admission status  9.39 (Emergency admission)    2. Significant lab findings  - UDS positive for THC    3. Psychotropic medication  - Risperidone m-tabs 4 mg QHS (psychosis)  	- Start 10/20, inc 10/25, 10/29  - Trazodone 25mg hs on 1017/ changed to PRN 10/29    Agitation PRNs: Haloperidol PO/IM, Lorazepam PO/IM, Diphenhydramine PO/IM    Discontinued Aripiprazole 5 mg QHS (psychosis)  	- Start 10/2, inc 10/6, inc 10/9, inc 10/11, inc 10/13, dec 10/20, dec 10/21, dec 10/23, DC 10/25  	- Cross-titrating to risperidone due to poor efficacy  Discontinued Clonazepam 0.5 mg QHS (insomnia)	  	- Tapering to DC due to continued oversedation    4. Medical conditions, other medication, consults  A) Vaginal discharge  - Metronidazole and fluconazole per hospitalist    5. Psychosocial interventions  - Patient provided verbal consent to speak with mother  - CO 1:1: Not required  - Restrictions/allowances: None

## 2023-11-01 PROCEDURE — 90853 GROUP PSYCHOTHERAPY: CPT

## 2023-11-01 PROCEDURE — 99232 SBSQ HOSP IP/OBS MODERATE 35: CPT

## 2023-11-01 RX ADMIN — Medication 500 MILLIGRAM(S): at 20:36

## 2023-11-01 RX ADMIN — RISPERIDONE 4 MILLIGRAM(S): 4 TABLET ORAL at 20:36

## 2023-11-01 RX ADMIN — Medication 2 MILLIGRAM(S): at 10:18

## 2023-11-01 RX ADMIN — Medication 2 MILLIGRAM(S): at 15:12

## 2023-11-01 RX ADMIN — Medication 500 MILLIGRAM(S): at 10:18

## 2023-11-01 NOTE — BH INPATIENT PSYCHIATRY PROGRESS NOTE - NSBHMSEKNOWHOW_PSY_ALL_CORE
Educational attainment/Vocabulary

## 2023-11-01 NOTE — BH INPATIENT PSYCHIATRY PROGRESS NOTE - NSBHMSEAFFQUAL_PSY_A_CORE
Euthymic
Anxious
Euthymic
Anxious
Euthymic
Anxious
Euthymic
Anxious
Anxious
Euthymic
Euthymic
Anxious
Euthymic
Anxious

## 2023-11-01 NOTE — BH INPATIENT PSYCHIATRY PROGRESS NOTE - NSBHMSEMOOD_PSY_A_CORE
Anxious
Normal
Anxious
Normal
Normal
Angry
Anxious
Anxious
Normal
Angry
Normal
Anxious

## 2023-11-01 NOTE — BH DISCHARGE NOTE NURSING/SOCIAL WORK/PSYCH REHAB - NSDCPRGOAL_PSY_ALL_CORE
Writer During the current hospitalization, patient has been addressing psychiatric rehabilitation goals pertaining to identifying coping skills that assist with patient's needs. Patient has demonstrated progress towards psychiatric rehabilitation goals during the current hospitalization. Patient exhibited progress through participating in individual and group therapy and developing additional coping skills to assist with managing negative emotions such as taking walks, listening to music, and TIPP skills. Writer encouraged patient to continue to strengthen and practice effective skills. Patient was receptive. Patient attended approximately 90 percent of psychiatric rehabilitation groups. Patient met goal of identifying coping skills by reporting these skills have helped her during current hospitalization. Patient reports she will continue to practice coping skills. Patient reports overall improvement in mood. Patient reports feeling "happy" to go home. Patient reports she is looking forward to seeing her mother and spending time with her today after discharged. Patient completed safety plan with psych rehab therapist. Patient was compliant with medications during current hospitalization. Patient was visible on the unit and was appropriate with peers and staff. Patient was provided with a Press Ganey survey prior to discharge.

## 2023-11-01 NOTE — BH PSYCHOLOGY - GROUP THERAPY NOTE - NSPSYCHOLGRPDBTEMOT_PSY_A_CORE FT
na
Accumulating Positives: Long Term 
na
Accumulating Positives: Short Term 
Opposite Action 
Accumulating Positives: Long Term 
Accumulating Positives: Short Term 
PLEASE 
na

## 2023-11-01 NOTE — BH DISCHARGE NOTE NURSING/SOCIAL WORK/PSYCH REHAB - NSDCPRRECOMMEND_PSY_ALL_CORE
Psychiatric rehabilitation staff recommends patient will benefit from attending [facility] for medication management, support, and psychotherapy

## 2023-11-01 NOTE — BH DISCHARGE NOTE NURSING/SOCIAL WORK/PSYCH REHAB - NSDCVIVACCINE_GEN_ALL_CORE_FT
influenza, injectable, quadrivalent, preservative free; 14-Oct-2023 15:10; Mila Rosenberg (RN); [employer]; [id number] (Exp. Date: 30-Jun-2024); IntraMuscular; Deltoid Left.; 0.5 milliLiter(s); VIS (VIS Published: 06-Aug-2021, VIS Presented: 14-Oct-2023);

## 2023-11-01 NOTE — BH DISCHARGE NOTE NURSING/SOCIAL WORK/PSYCH REHAB - NSBHDCADDR1FT_A_CORE
Ambulatory Care [facility]- 2nd floor  [address]  [facility] [address]

## 2023-11-01 NOTE — BH PSYCHOLOGY - GROUP THERAPY NOTE - NSPSYCHOLGRPDBTPT_PSY_A_CORE FT
DBT Group is a group in which patients learn skills to better manage their emotions and behaviors. Group begins with a mindfulness practice so that patients have an opportunity to practice observing their internal and external experiences.  Following the mindfulness exercise the group learns new skills in a didactic format. Group today focused on the “emotion regulation” module.  Specifically, patients learned the skill of Accumulating Positive Emotions in the Long Term by identifying values and translating those into goals and manageable action steps. Patients shared values that are important to them and how these translate into goals, as well as how they’ve begun to implement these. 
DBT skills generalization group is a group in which patients review the skill taught the day before, and patients have the opportunity to troubleshoot the skill, engage in more practice, and share their experience using the skill. Today’s skills review group focused on the skill of Accumulating Positive Emotions in the Long Term by identifying values and translating those into goals and manageable action steps. Patients shared values that are important to them and how these translate into goals, as well as how they’ve begun to implement these. 
DBT Group is a group in which patients learn skills to better manage their emotions and behaviors. Group begins with a mindfulness practice so that patients have an opportunity to practice observing their internal and external experiences.  Following the mindfulness exercise the group learns new skills in a didactic format. Pts were taught the concept of “wise mind,” which is about identifying different states of mind (emotional vs. reasonable mind) and finding ways to tap into wise mind which is a blend of the two, and the state of mind that is consistent with wise decision making and long term goals and values. 
DBT Group is a group in which patients learn skills to better manage their emotions and behaviors. Group begins with a mindfulness practice so that patients have an opportunity to practice observing their internal and external experiences. Following the mindfulness exercise the group learns new skills in a didactic format. Group today focused on the “distress tolerance” module, which are skills to help patients manage their crisis urges. Specifically, patients learned “TIPP” skills, which are skills to use when patients are highly distressed (at least 70/100) and are unable to think effectively to use other skills. These skills involve “Tipping” body chemistry through using temperature, intense exercise, paced breathing and progressive muscle relaxation. Each skill was reviewed and patients practiced progressive muscle relaxation and paced breathing in the session. Patients were encouraged to practice these skills while on the unit.
DBT Group is a group in which patients learn skills to better manage their emotions and behaviors. Group begins with a mindfulness practice so that patients have an opportunity to practice observing their internal and external experiences. Following the mindfulness exercise the group learns new skills in a didactic format. Group today focused on the “distress tolerance” module. Specifically, patients learned the “IMPROVE” skill which involves using mental techniques to lower distress. These techniques include using imagery, making meaning, using prayer, focusing on one thing at a time, taking a vacation, and using encouragement. Patients practiced some of these skills in session as they were guided through a relaxation and grounding exercise. 
DBT Group is a group in which patients learn skills to better manage their emotions and behaviors. Group begins with a mindfulness practice so that patients have an opportunity to practice observing their internal and external experiences.  Following the mindfulness exercise the group learns new skills in a didactic format. Group today focused on the “distress tolerance” module.  Specifically, patients learned the skills of “STOP,” which teaches patients to pause and think before acting on emotions, and “pros and cons,” which is a way to objectively look at impulsive and destructive behaviors and see the short term and long term consequences of them. Patients were guided through an example of pros and cons provided by the writer as well as scenarios where the STOP skill is relevant, and were encouraged to complete their own pros and cons and STOP skills for homework. 
DBT skills generalization group is a group in which patients review the skill taught the day before, and patients have the opportunity to troubleshoot the skill, engage in more practice, and share their experience using the skill. Today’s skills review group focused on the skill of Accumulating Positive Emotions in the Long Term by identifying values and translating those into goals and manageable action steps. Patients shared values that are important to them and how these translate into goals, as well as how they’ve begun to implement these. 
DBT Group is a group in which patients learn skills to better manage their emotions and behaviors. Group begins with a mindfulness practice so that patients have an opportunity to practice observing their internal and external experiences. Following the mindfulness exercise the group learns new skills in a didactic format. Group today focused on the “emotion regulation” module. Specifically, patients learned “accumulating positives,” which focused on ways to build up positive experiences in the short term to balance out negative experiences in their lives. Patients were given a pleasant activities list for ideas of positive activities they can incorporate into their everyday lives. Patients were asked to commit to pleasant activities they would engage in on the unit today to improve their moods. 
DBT Group is a group in which patients learn skills to better manage their emotions and behaviors. Group begins with a mindfulness practice so that patients have an opportunity to practice observing their internal and external experiences.  Following the mindfulness exercise the group learns new skills in a didactic format. Group today focused on the “emotion regulation” module.  Specifically, patients learned the skill of Accumulating Positive Emotions in the Long Term by identifying values and translating those into goals and manageable action steps. Patients shared values that are important to them and how these translate into goals, as well as how they’ve begun to implement these. 
DBT Group is a group in which patients learn skills to better manage their emotions and behaviors. Group begins with a mindfulness practice so that patients have an opportunity to practice observing their internal and external experiences.  Following the mindfulness exercise the group learns new skills in a didactic format. Group today focused on the “emotion regulation” module.  Specifically, patients learned the skills of “PLEASE,” or taking care of the mind by taking care of the body. This skill involves maintaining consistent treatment for physical illness, balanced eating, avoidance of mood-altering substances, balanced sleep, and exercise.  provided examples and suggestions of ways to improve these areas, and patients were encouraged to engage in discussion of ways they can prioritize and implement this skill. 
DBT skills generalization group is a group in which patients review the skill taught the day before, and patients have the opportunity to troubleshoot the skill, engage in more practice, and share their experience using the skill. Today’s skills review group focused on the skills of “radical acceptance” and "willingness" which include accepting painful situations in their lives they cannot change through turning the mind and practicing engaging in reality effectively. Patients were asked to determine difficult situations in their lives they needed to work on accepting, and provided examples of ways to practice this while in the hospital. 
DBT Group is a group in which patients learn skills to better manage their emotions and behaviors. Group begins with a mindfulness practice so that patients have an opportunity to practice observing their internal and external experiences.  Following the mindfulness exercise the group learns new skills in a didactic format. Group today focused on the “distress tolerance” module.  Specifically, patients learned the skills of “STOP,” which teaches patients to pause and think before acting on emotions, and “pros and cons,” which is a way to objectively look at impulsive and destructive behaviors and see the short term and long term consequences of them. Patients were guided through an example of pros and cons provided by the writer as well as scenarios where the STOP skill is relevant, and were encouraged to complete their own pros and cons and STOP skills for homework. 
DBT Group is a group in which patients learn skills to better manage their emotions and behaviors. Group begins with a mindfulness practice so that patients have an opportunity to practice observing their internal and external experiences.  Following the mindfulness exercise the group learns new skills in a didactic format. Group today focused on the “emotion regulation” module.  Specifically, patients learned the skills of “check the facts,” and “opposite action.” “Check the facts” is about being more realistic about interpretations and assumptions and challenging them appropriately to think more rationally, and “opposite action” involves acting opposite to problematic urges that come with emotions.  provided an example of checking the facts, and patients were encouraged to think about how these skills, and were assigned homework to practice. 
DBT Group is a group in which patients learn skills to better manage their emotions and behaviors. Group begins with a mindfulness practice so that patients have an opportunity to practice observing their internal and external experiences.  Following the mindfulness exercise the group learns new skills in a didactic format. Group today focused on the “distress tolerance” module, which are skills to help patients manage their crisis urges.  Specifically, pts learned the skill of “radical acceptance,” which includes accepting painful situations in their lives they cannot change through turning the mind and practicing willingness. Patients were asked to determine difficult situations in their lives they needed to work on accepting, and provided examples of ways to practice this while in the hospital. 
DBT Group is a group in which patients learn skills to better manage their emotions and behaviors. Group begins with a mindfulness practice so that patients have an opportunity to practice observing their internal and external experiences. Following the mindfulness exercise the group learns new skills in a didactic format. Group today focused on the “emotion regulation” module. Specifically, patients learned “accumulating positives,” which focused on ways to build up positive experiences in the short term to balance out negative experiences in their lives. Patients were given a pleasant activities list for ideas of positive activities they can incorporate into their everyday lives. Patients were asked to commit to pleasant activities they would engage in on the unit today to improve their moods. 
DBT skills generalization group is a group in which patients review the skill taught the day before, and patients have the opportunity to troubleshoot the skill, engage in more practice, and share their experience using the skill. Today’s skills review group focused on the skill IMPROVE the Moment, which is a distress tolerance skill that helps individuals tolerate intense emotions without acting on ineffective urges. The skill includes Imagery, finding Meaning in difficulty, Relaxation and self-Encouragement, among other components.  encouraged patients to share examples of how they tried using the skill, and leader as well as fellow participants provided helpful feedback and support. 
DBT Group is a group in which patients learn skills to better manage their emotions and behaviors. Group begins with a mindfulness practice so that patients have an opportunity to practice observing their internal and external experiences.  Following the mindfulness exercise the group learns new skills in a didactic format. Group today focused on the “distress tolerance” module, which are skills to help patients manage their crisis urges.  Specifically, pts learned the skill of “radical acceptance,” which includes accepting painful situations in their lives they cannot change through turning the mind and practicing willingness. Patients were asked to determine difficult situations in their lives they needed to work on accepting, and provided examples of ways to practice this while in the hospital.

## 2023-11-01 NOTE — BH PSYCHOLOGY - GROUP THERAPY NOTE - PROVIDER ATTESTATION
I was present with the psychology trainee during the critical/key portions of the visit. I agree with the findings and plan as documented in the psychology trainee note unless noted below.

## 2023-11-01 NOTE — BH PSYCHOLOGY - GROUP THERAPY NOTE - NSPSYCHOLGRPDBTTOL_PSY_A_CORE FT
IMPROVE 
na
Pros & Cons / STOP 
na
IMPROVE 
na
na
TIPP
na
Willingness 
Willingness 
Radical Acceptance 
na
Pros & Cons / STOP 
na

## 2023-11-01 NOTE — BH INPATIENT PSYCHIATRY PROGRESS NOTE - NSBHMSETHTCONTENT_PSY_A_CORE
Delusions/Ideas of reference
Unremarkable
Delusions/Ideas of reference
Unremarkable
Unremarkable
Ideas of reference/Other
Other
Other
Delusions/Ideas of reference
Delusions/Ideas of reference
Other
Ideas of reference/Other
Delusions/Ideas of reference
Delusions/Ideas of reference

## 2023-11-01 NOTE — BH DISCHARGE NOTE NURSING/SOCIAL WORK/PSYCH REHAB - DISCHARGE INSTRUCTIONS AFTERCARE APPOINTMENTS
In order to check the location, date, or time of your aftercare appointment, please refer to your Discharge Instructions Document given to you upon leaving the hospital.  If you have lost the instructions please call [phone]

## 2023-11-01 NOTE — BH PSYCHOLOGY - GROUP THERAPY NOTE - NSPSYCHOLGRPDBTGOAL_PSY_A_CORE
reduce mood and affective lability/improve ability to indentify feelings/improve ability to communicate feelings/reduce vulnerability to emotional dysregualation

## 2023-11-01 NOTE — BH INPATIENT PSYCHIATRY PROGRESS NOTE - NSBHASSESSSUMMFT_PSY_ALL_CORE
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 Mueller (a psychiatrist for [employer] to which father belongs), prescribed Prozac 20mg, with some restlessness she noted after taking it vs feeling "sped up", no hx of sa , remote hx of NSSIB early in high-school but none recent, presented to the [facility] ED 2x in one week at the behest of her parents for 2 weeks of acute behavioral change, anxiety, disorganized/difficult to follow speech, poor sleep and passive SI.    On Admit to 1S patient recounts hx consistent with new onset psychotic illness with some decline in function x6mo likely prodromal and onset of acute positive sx (AH, IOR, though seemingly not yet crystalized delusions) in context of recently starting Prozac for depressive and anxious sx (also likely to have been prodromal sx). At this time patient not appearing with manic presentation though will seek further dx clarity with ongoing observation. Of note patient with some BPD traits most prominent when she was in high-school (cutting, abandonment fears), though BPD does not account for current presentation.     Week of 10/9: patient with some improvements in thought disorder, no overt AH at this time, though still quite referential and paranoid at times. Plan to increase Abilify. Was started on Cogentin 1mg after getting Haldol PRN over weekend as there was some concern for EPS though none apparent on exam today. Will order STI testing as per pt request.  Abilify increased to 20mg.  Benadryl added for insomnia.  Week of 10/16: The patient is showing some improvement in symptoms.  Continue to have some paranoia, but getting better.  Tolerating medications well.  More visible on the unit.  Discussed with nursing, will discontinue elopement precautions, as patient is calmer, more involved in treatment, no longer by the door.  Patient with some insomnia, will change Abilify to am and start trazodone 25mg hs.  Abilify in the day caused sedation, will move back to hs.  Week of 10/23: Psychotic symptoms are improving since switching to Risperdal, though she does not believe her experiences were caused by her state of mind. Continuing to cross-titrate Abilify to Risperdal.  Week of 10/30: Patient is demonstrating significant improvement in psychosis. She complains of some dizziness/lightheadedness; we will continue to monitor.   11/1: dizziness decreased. affect brighter.     1. Admission status  9.39 (Emergency admission)    2. Significant lab findings  - UDS positive for THC    3. Psychotropic medication  - Risperidone m-tabs 4 mg QHS (psychosis)  	- Start 10/20, inc 10/25, 10/29  - Trazodone 25mg hs on 1017/ changed to PRN 10/29    Agitation PRNs: Haloperidol PO/IM, Lorazepam PO/IM, Diphenhydramine PO/IM    Discontinued Aripiprazole 5 mg QHS (psychosis)  	- Start 10/2, inc 10/6, inc 10/9, inc 10/11, inc 10/13, dec 10/20, dec 10/21, dec 10/23, DC 10/25  	- Cross-titrating to risperidone due to poor efficacy  Discontinued Clonazepam 0.5 mg QHS (insomnia)	  	- Tapering to DC due to continued oversedation    4. Medical conditions, other medication, consults  A) Vaginal discharge  - Metronidazole and fluconazole per hospitalist    5. Psychosocial interventions  - Patient provided verbal consent to speak with mother  - CO 1:1: Not required  - Restrictions/allowances: None

## 2023-11-01 NOTE — BH PSYCHOLOGY - GROUP THERAPY NOTE - NSPSYCHOLGRPDBTINT_PSY_A_CORE
other...
other...
review emotion regulation homework
other...
other...
reviewed distress tolerance, skills homework
other...
reviewed distress tolerance, skills homework
other...
review emotion regulation homework
other...

## 2023-11-01 NOTE — BH PSYCHOLOGY - GROUP THERAPY NOTE - NSPSYCHOLGRPDBTPT_PSY_A_CORE
stable mood and affect in group/patient showing good behavior control/other...
labile mood and affect in group/other...
stable mood and affect in group/patient showing good behavior control/other...
Patient unable to identify mood states/Patient unable to participate due to clinical symptoms/other...
stable mood and affect in group/patient showing good behavior control/other...
stable mood and affect in group/patient showing good behavior control/other...
Patient unable to identify mood states/Patient unable to participate due to clinical symptoms/other...
stable mood and affect in group/patient showing good behavior control/other...

## 2023-11-01 NOTE — BH INPATIENT PSYCHIATRY PROGRESS NOTE - NSBHMSEBEHAV_PSY_A_CORE
superficially cooperative, guarded, suspicious/Cooperative

## 2023-11-01 NOTE — PROGRESS NOTE ADULT - ASSESSMENT
23F with psychosis admitted to [facility] for management. Med c/s for vaginal dc. DDx includes BV, trichomonas, v. less likely vaginal candidiasis.    #BV v. Trich  -Urine studies negative- UA neg. GC/chlam/Trich studies neg. Will cont empiric tx as below given improvement.  -S/p fluconazole 150 PO x1. Cont flagyl 500 BID x7d total.  -OP follow up w/ PCP/OB GYN for routine f/u.    #Psychosis/depression  -As per primary team.    Pls call w/ any questions. Will sign off.

## 2023-11-01 NOTE — PROGRESS NOTE ADULT - SUBJECTIVE AND OBJECTIVE BOX
CC/Reason for Consult: Vaginal discharge    SUBJECTIVE / OVERNIGHT EVENTS:  Pt seen and evaluated thsi AM. No o/n events. Feels well. Reported some dizziness, which she attributes to SSRI. Tolerating abx. States her Dc/symptoms have improved. No complaints.    MEDICATIONS  (STANDING):  metroNIDAZOLE    Tablet 500 milliGRAM(s) Oral two times a day  risperiDONE  Disintegrating Tablet 4 milliGRAM(s) Oral at bedtime    MEDICATIONS  (PRN):  acetaminophen     Tablet .. 650 milliGRAM(s) Oral every 6 hours PRN Mild Pain (1 - 3)  artificial  tears Solution 1 Drop(s) Both EYES three times a day PRN dry eyes  diphenhydrAMINE 50 milliGRAM(s) Oral every 6 hours PRN Agitation  diphenhydrAMINE Injectable 50 milliGRAM(s) IntraMuscular once PRN rash/eps/agitation  fluticasone propionate 50 MICROgram(s)/spray Nasal Spray 1 Spray(s) Both Nostrils two times a day PRN nasal congestion  haloperidol    Injectable 5 milliGRAM(s) IntraMuscular Once PRN extreme agitation secondary to psychosis  hydrOXYzine hydrochloride 25 milliGRAM(s) Oral three times a day PRN Anxiety  ibuprofen  Tablet. 400 milliGRAM(s) Oral every 6 hours PRN Mild Pain (1 - 3), Moderate Pain (4 - 6)  LORazepam     Tablet 1 milliGRAM(s) Oral every 6 hours PRN anxiety  LORazepam   Injectable 2 milliGRAM(s) IntraMuscular Once PRN extreme anxiety  melatonin. 5 milliGRAM(s) Oral at bedtime PRN Insomnia  nicotine  Polacrilex Gum 2 milliGRAM(s) Oral every 2 hours PRN cravings  traZODone 25 milliGRAM(s) Oral at bedtime PRN insomnia      Vital Signs Last 24 Hrs  T(C): 36.8 (01 Nov 2023 07:44), Max: 36.8 (31 Oct 2023 20:07)  T(F): 98.3 (01 Nov 2023 07:44), Max: 98.3 (31 Oct 2023 20:07)  HR: 78 (01 Nov 2023 07:44) (78 - 78)  BP: 97/63 (01 Nov 2023 07:44) (97/63 - 97/63)      CAPILLARY BLOOD GLUCOSE            PHYSICAL EXAM:  GENERAL: NAD, well-developed  HEAD:  Atraumatic, Normocephalic  EYES: EOMI, conjunctiva and sclera clear  NECK: Supple, No JVD  PSYCH: AAOx3  NEUROLOGY: non-focal      LABS:                    RADIOLOGY & ADDITIONAL TESTS:    Imaging Personally Reviewed:    Consultant(s) Notes Reviewed:      Care Discussed with Consultants/Other Providers:

## 2023-11-01 NOTE — BH DISCHARGE NOTE NURSING/SOCIAL WORK/PSYCH REHAB - NSDCADDINFO1FT_PSY_ALL_CORE
Please arrive to you appointment at 10:30AM. Please bring your ID and Insurance Card. The following days of your partial program will be from 9:00AM to 3:00PM.

## 2023-11-01 NOTE — BH DISCHARGE NOTE NURSING/SOCIAL WORK/PSYCH REHAB - PATIENT PORTAL LINK FT
You can access the FollowMyHealth Patient Portal offered by [facility] by registering at the following website: [email or web address]. By joining [employer]’s FollowMyHealth portal, you will also be able to view your health information using other applications (apps) compatible with our system.

## 2023-11-01 NOTE — BH PSYCHOLOGY - GROUP THERAPY NOTE - NSPSYCHOLGRPDBTPROB_PSY_A_CORE
psychotic episodes

## 2023-11-02 PROCEDURE — 99232 SBSQ HOSP IP/OBS MODERATE 35: CPT

## 2023-11-02 PROCEDURE — 90853 GROUP PSYCHOTHERAPY: CPT

## 2023-11-02 RX ORDER — RISPERIDONE 4 MG/1
1 TABLET ORAL
Qty: 14 | Refills: 0
Start: 2023-11-02 | End: 2023-11-15

## 2023-11-02 RX ORDER — METRONIDAZOLE 500 MG
1 TABLET ORAL
Qty: 6 | Refills: 0
Start: 2023-11-02 | End: 2023-11-04

## 2023-11-02 RX ADMIN — RISPERIDONE 4 MILLIGRAM(S): 4 TABLET ORAL at 20:51

## 2023-11-02 RX ADMIN — Medication 500 MILLIGRAM(S): at 20:51

## 2023-11-02 RX ADMIN — Medication 500 MILLIGRAM(S): at 08:40

## 2023-11-02 RX ADMIN — Medication 2 MILLIGRAM(S): at 08:47

## 2023-11-02 NOTE — BH PSYCHOLOGY - GROUP THERAPY NOTE - NSPSYCHOLGRPBILLING_PSY_A_CORE
[id number] - Group Psychotherapy
[id number] - Group Psychotherapy
[id number] - Group Psychotherapy
[id number] - Group Psychotherapy
[id number] - Group Psychotherapy
[id number] - Group Psychotherapy
[id number] - Group Psychotherapy
[id number] - Group Psychotherapy
[id number] - Group Psychotherapy
[id number] - Group Psychotherapy
[id number] - Group Psychotherapy
[id number] - Group Psychotherapy
[id number] - Group Psychotherapy
[id number] - Group Psychotherapy
[id number] - Group Psychotherapy
[id number] - Group Psychotherapy
[id number] - Group Psychotherapy
[id number] - Group Psychotherapy
[id number] - Group Psychotherapy

## 2023-11-02 NOTE — BH PSYCHOLOGY - GROUP THERAPY NOTE - NSBHPTASSESSDT_PSY_A_CORE
17-Oct-2023 11:15
20-Oct-2023 11:15
27-Oct-2023 11:15
06-Oct-2023 11:15
10-Oct-2023 11:15
26-Oct-2023 11:00
30-Oct-2023 11:15
24-Oct-2023 11:15
18-Oct-2023 09:15
16-Oct-2023 11:15
09-Oct-2023 11:15
12-Oct-2023 15:15
23-Oct-2023 11:15
02-Nov-2023 15:15
19-Oct-2023 11:00
01-Nov-2023 09:15
11-Oct-2023 09:15
25-Oct-2023 09:15
31-Oct-2023 11:15

## 2023-11-02 NOTE — BH PSYCHOLOGY - GROUP THERAPY NOTE - TOKEN PULL-DIAGNOSIS
Primary Diagnosis:  Psychosis [F29]        Problem Dx:   
Primary Diagnosis:    Problem Dx:

## 2023-11-02 NOTE — BH INPATIENT PSYCHIATRY PROGRESS NOTE - NSTXDISORGDATEEST_PSY_ALL_CORE
06-Oct-2023
02-Nov-2023
06-Oct-2023

## 2023-11-02 NOTE — BH INPATIENT PSYCHIATRY PROGRESS NOTE - NSTXIMPULSDATEEST_PSY_ALL_CORE
06-Oct-2023
02-Nov-2023
06-Oct-2023

## 2023-11-02 NOTE — BH INPATIENT PSYCHIATRY PROGRESS NOTE - MSE UNSTRUCTURED FT
On exam, patient is fairly groomed with fair hygiene. Appropriate eye contact, cooperative with interview.  Speech:  normal productivity, low volume, rate, fluency.    Normal gait/station.   Motor: no PMA/PMR evident.   Mood is "fine, a little better"   Affect: full, mood congruent.   Thought process: patient mostly linear, goal directed on assessment.   Thought content; Considerable and varied IOR,  denies passive SI, denies SI/HI/VI, denies thoughts to self-injure.   Perceptual; Denies any AH/VH here on the unit at this time but had reported +AH (hearing a friend through the vents), no other sensory disturbances elicited.    Cognition: AAOx3  Attention/concentration: distractible   Insight: limited  Judgement: fair.   Impulse control:  intact at this time. But tenuous over weekend. 
On exam today the patient is generally cooperative with fair eye contact.    Speech is clear and of normal rate.    Thought process: with no evidence of a disorder of thought process.    Thought content: with no evidence of psychotic beliefs.  Perception: Denies hallucinations.    Mood: Describes as "getting better".  Affect: constricted.    Patient denies active suicidal ideation, intent and plan.    Patient denies active aggressive/homicidal ideation, intent or plan.   Patient is Alert and oriented in all spheres. Patient is cognitively grossly intact.   Insight and judgment are limited. Impulse control is intact at this time.    
The patient appears stated age, with fair hygiene, and is dressed appropriately.  She was calm and cooperative with the interview.  She maintained appropriate eye contact.  No restlessness or slowing of movements observed.  Gait is steady.  The patient’s speech was fluent, normal in tone, rate, and volume.  The patient’s mood is “getting better.”  Her affect is constricted, stable, appropriate.  The patient’s thoughts are goal directed.  She has paranoid delusions, denies hallucinations.  She does not have any suicidal or homicidal ideation, intent, or plan.  Insight is partial.  Judgment is improving.  Impulse control has been better on the unit.
Appearance: Dressed appropriately.  Behavior: Cooperative.    Motor: No abnormal movements, no psychomotor slowing or activation.  Speech: Regular rate.  Mood: "Good."  Affect: Pleasant.   Thought Process: Linear.  Associations: Fair.  Thought Content: Denies AVH.  Denies SIIP or HIIP.  Insight: Limited.  Judgment: Fair on interview.  Attention: Fair.  Language: Fluent.  Gait: Intact.   
On exam, patient is poorly groomed with fair hygiene. Appropriate eye contact, cooperative with interview.  Speech:  normal productivity, volume, rate, fluency.    Normal gait/station.   Motor: no PMA/PMR evident.   Mood is "anxious"   Affect: blunted, mood congruent.   Thought process: patient mostly linear, goal directed on assessment.   Thought content; discharge focused, Considerable and varied IOR, not yet crystalized into stable delusions, +passive SI, denies SI/HI/VI, denies thoughts to self-injure.   Perceptual; +AH (hearing a friend through the vents), no other sensory disturbances elicited.    Cognition: AAOx3  Attention/concentration: distractible   Insight: limited  Judgement: fair.   Impulse control:  intact at this time.
The patient appears stated age, with fair hygiene, and is dressed appropriately.  She was calm and cooperative with the interview.  She maintained appropriate eye contact.  No restlessness or slowing of movements observed.  Gait is steady.  The patient’s speech was fluent, normal in tone, rate, and volume.  The patient’s mood is “tired.”  Her affect is constricted, stable, appropriate.  The patient’s thoughts are goal directed.  She has paranoid delusions, but improving, denies hallucinations.  She does not have any suicidal or homicidal ideation, intent, or plan.  Insight is partial.  Judgment is improving.  Impulse control has been better on the unit.
On exam today the patient is generally cooperative with fair eye contact.    Speech is clear and of normal rate.    Thought process: with no evidence of a disorder of thought process.    Thought content: with no evidence of psychotic beliefs.  Perception: Denies hallucinations.    Mood: Describes as "getting better".  Affect: constricted.    Patient denies active suicidal ideation, intent and plan.    Patient denies active aggressive/homicidal ideation, intent or plan.   Patient is Alert and oriented in all spheres. Patient is cognitively grossly intact.   Insight and judgment are limited. Impulse control is intact at this time.    
The patient appears stated age, with fair hygiene, and is dressed appropriately.  She was calm and cooperative with the interview.  She maintained appropriate eye contact.  No restlessness or slowing of movements observed.  Gait is steady.  The patient’s speech was fluent, normal in tone, rate, and volume.  The patient’s mood is “okay.”  Her affect is constricted, stable, appropriate.  The patient’s thoughts are goal directed.  She has some paranoid delusions, but improving, denies hallucinations.  She does not have any suicidal or homicidal ideation, intent, or plan.  Insight is partial.  Judgment is improving.  Impulse control has been better on the unit.
On exam, patient is poorly groomed with fair hygiene. Appropriate eye contact, cooperative with interview.  Speech:  normal productivity, low volume, rate, fluency.    Normal gait/station.   Motor: no PMA/PMR evident.   Mood is "okay"   Affect: full, mood congruent.   Thought process: patient mostly linear, goal directed on assessment.   Thought content; discharge focused, Considerable and varied IOR, not yet crystalized into stable delusions,  denies passive SI, denies SI/HI/VI, denies thoughts to self-injure.   Perceptual; Denies any AH/VH here on the unit at this time but had reported +AH (hearing a friend through the vents), no other sensory disturbances elicited.    Cognition: AAOx3  Attention/concentration: distractible   Insight: limited  Judgement: fair.   Impulse control:  intact at this time.

## 2023-11-02 NOTE — BH INPATIENT PSYCHIATRY PROGRESS NOTE - NSTXDCOPLKGOAL_PSY_ALL_CORE
Will agree to consider an appropriate level of outpatient care

## 2023-11-02 NOTE — BH INPATIENT PSYCHIATRY PROGRESS NOTE - MSE OPTIONS
Structured MSE
Unstructured MSE
Structured MSE
Unstructured MSE
Structured MSE
Unstructured MSE
Structured MSE
Unstructured MSE
Structured MSE
Structured MSE
Unstructured MSE
Unstructured MSE
Structured MSE
Unstructured MSE
Structured MSE

## 2023-11-02 NOTE — BH INPATIENT PSYCHIATRY PROGRESS NOTE - NSBHASSESSSUMMFT_PSY_ALL_CORE
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 Mueller (a psychiatrist for [employer] to which father belongs), prescribed Prozac 20mg, with some restlessness she noted after taking it vs feeling "sped up", no hx of sa , remote hx of NSSIB early in high-school but none recent, presented to the [facility] ED 2x in one week at the behest of her parents for 2 weeks of acute behavioral change, anxiety, disorganized/difficult to follow speech, poor sleep and passive SI.    Attenuated presentation, no overt signs of positive sx of psychosis.  Future oriented.  Discharge tomorrow.    1. Admission status  9.39 (Emergency admission)    2. Significant lab findings  - UDS positive for THC    3. Psychotropic medication  - Risperidone m-tabs 4 mg QHS (psychosis)  	- Start 10/20, inc 10/25, 10/29  - Trazodone 25mg hs on 1017/ changed to PRN 10/29    Agitation PRNs: Haloperidol PO/IM, Lorazepam PO/IM, Diphenhydramine PO/IM    Discontinued Aripiprazole 5 mg QHS (psychosis)  	- Start 10/2, inc 10/6, inc 10/9, inc 10/11, inc 10/13, dec 10/20, dec 10/21, dec 10/23, DC 10/25  	- Cross-titrating to risperidone due to poor efficacy  Discontinued Clonazepam 0.5 mg QHS (insomnia)	  	- Tapering to DC due to continued oversedation    4. Medical conditions, other medication, consults  A) Vaginal discharge  - Metronidazole and fluconazole per hospitalist    5. Psychosocial interventions  - Patient provided verbal consent to speak with mother  - CO 1:1: Not required  - Restrictions/allowances: None

## 2023-11-02 NOTE — BH INPATIENT PSYCHIATRY PROGRESS NOTE - NSBHFUPINTERVALHXFT_PSY_A_CORE
Chart reviewed, including vital signs, medication administration record, lab results, and nursing notes.   Case discussed with nursing, psychology, psych rehab, and social work in team meeting.   - Per staff patient appears less paranoid    Patient is seen in the group room under no acute distress and amenable to interview. Patient acknowledges that she could have been paranoid before, but states as she is doing better. She continues to receive a lot of judgment from her family regarding her potential diagnosis. She continues to insist that someone was playing her voice inside of an air vent, but states that she “doesn’t care anymore“ about this. She acknowledges experiencing a headache yesterday and today. She believes that she has ADHD and a learning disability and requests an evaluation for this. She reports stable sleep and appetite. Denies SI/HI/AVH.
Chart reviewed. discussed case with nursing.   Pt this AM had been agitated and irritable, stating her notebook was stolen and she had to leave the hospital immediately. She started kicking, banging in her room and required Benadryl mg, Haldol 5mg and Ativan 2mg IM AT 1106AM. She continued to behave aggressively and was therefore placed in 5pt restraints.     pt remained in restraints for 45 mins and ultimately returned to her room and went to sleep.     
Patient seen for follow up of psychosis, chart reviewed, and case discussed with treatment team.  No events reported overnight.  The patient has been showing improvement in symptoms.  She feels paranoia is getting better, denies feeling others are talking about her today.  The patient states mood is okay, denies any SI.  Behavior has been well controlled.  The patient has been more visible on the unit, attending groups.  Encouraged her to continue to do so.  The patient reports eating well but she did not sleep well again last night.  Discussed moving Abilify to am dosing, and starting low dose trazodone to which she was in agreement.
Patient seen for follow up of psychosis, chart reviewed, and case discussed with treatment team.  No events reported overnight.  The patient states she has been feeling better overall.  She continues to have some paranoia, but feels it is getting better.  She feels targeted by staff at times, and continues to have somewhat low frustration tolerance from activity on the unit.  She states her mood is better, denies SI.  The patient has been more visible on the unit, attending groups.  Encouraged her to continue to do so.  The patient reports eating well and sleeping better.  She has been compliant with medications, tolerating them well.
Pt reports that she has been sleeping and eating well.  Has been reading and going to groups.  Denies hearing any voices, denies visual hallucinations, denies SIIP or HIIP.  States she is looking forward to going home to spend more time with family.  Plans on finding part time work until she returns to school in January.
Chart reviewed, including vital signs, medication administration record, lab results, and nursing notes.   Case discussed with nursing, psychology, psych rehab, and social work in team meeting.   - No acute events    Patient is seen in the bedroom under no acute distress and amenable to interview. The patient reports that she feels drowsy and dizzy today. She reports having consumed adequate water and that she slept well last night. I encouraged her to monitor the symptoms and that we would consider reducing the dose of Risperdal if it continued. Prior to coming to the hospital, she was feeling “hyper“ and acknowledges experiencing psychosis. She agrees to have a family meeting prior to discharge. She reports stable sleep and appetite. Denies SI/HI/AVH.
Chart reviewed, including vital signs, medication administration record, lab results, and nursing notes.   Case discussed with nursing, psychology, psych rehab, and social work in team meeting.   - Staff report the patient has been extremely upset on the phone. Received PRN atarax for anxiety.     Patient is seen in the day room under no acute distress and amenable to interview. Patient acknowledges that yesterday she had a “meltdown“ while on a phone call. She felt anxious about taking time off school, because her mother hopes that she can complete the semester. She feels worried that if she returns to school, she may have another meltdown and feel overwhelmed. She agrees to increase her dose of Risperdal because it has helped her. She also requests an emotional support animal. She describes her mood as “better“. Patient reports stable sleep and appetite. Denies SI/HI/AVH. Reports no medication side effects. We discussed the plan to increase the dose of Risperdal.
Chart reviewed, including vital signs, medication administration record, lab results, and nursing notes.   Case discussed with nursing, psychology, psych rehab, and social work in team meeting.   - No events reported overnight.      This morning, patient noted to be tearful in her room.  She is upset about her diagnosis and what it means for her future.  Education and support provided.  The patient remains discharge focused but understood the need for continued care for stabilization.  She continues to have paranoid delusions.  She states she did not sleep well last night, open to trying Benadryl tonight.  She denies side effects to Abilify, feels like it has been helping, agreeable to increasing dose.  She denies any SI.  Encouraged her to participate in groups.
Chart reviewed, including vital signs, medication administration record, lab results, and nursing notes.   Case discussed with nursing, psychology, psych rehab, and social work in team meeting.   - 1x PRN for anxiety/paranoia yesterday    Patient is seen in the group room under no acute distress and amenable to interview. Patient states that she has been able to calm herself today. She has been using an ice pack against her face to regulate her emotions. She acknowledged feeling stressed yesterday regarding school, but because she hopes to return. However, she acknowledges that her mental health is the priority now. She has been reading a book. She acknowledges the importance of medication compliance and feels that it is Risperdal is helping with her thoughts. She also reports having consumed an excess of alcohol prior to this hospitalization and agrees to limit her consumption in the future. She denies any feelings of paranoia or confusion currently. Patient reports stable sleep and appetite. Denies SI/HI/AVH. Reports no medication side effects.
Patient seen for follow up of psychosis  Chart reviewed and case discussed with nursing staff   Patient reports significant improvement and feels close to her "old self"  Denies SE
Chart reviewed, including vital signs, medication administration record, lab results, and nursing notes.   Case discussed with nursing, psychology, psych rehab, and social work in team meeting.   - No acute events overnight    Patient is seen in the group room under no acute distress and amenable to interview. Patient is discharge-focused today, and she also states that she does not want to take medication. She continues to insist that she was being targeted by someone and insists that she has not been hallucinating. When she tells her family about this, they “start praying that I’m not crazy“. She also mentions that she is being investigated by her school, because she was rude to the housing office when she was not assigned a single room. She reports decreased sleep and decreased appetite. Reports no medication side effects. Denies SI/HI/AVH. We discussed a plan to increase the dose of Abilify today.
Chart reviewed. discussed case with nursing. No prns were required overnight or this morning and patient has been in good behavioral control with no aggression/incidents.   This morning patient is seen in the day room. She reports that she felt that someone had taken her notebook yesterday and she felt frustrated the staff wasn't listening to her and she became extremely agitated. She acknowledges that her behavior was inappropriate and is not sure why she behaved like that.   She accepted the abilify this AM but reports that she felt her vision to be a bit blurry this morning and her shoulders were stiff. No evidence of cogwheeling at this time but patient is accepting of a dose of cogentin and accepting of a QHS dose.   No ah/vh here on the unit and no si/i/p. 
f/up psychosis, vitals --160/70--asymptomatic today, RN to repeat,  Reported that she felt dizzy right after meds last night and patient encouraged to increase fluids. Patient reported sleeping well and with good appetite. Reported that she is here for tx of depression sx and that she had experience a "traumatic event" prior to admission which made her disorganized, Reported thoughts are clearer now. 
f/up psychosis, care discussed w/ tx team, VSS. no issues overnight, no prns.  Patient reported feeling less depressed and less anxious. denied feeling hopeless or helpless. Patient reported sleeping well and with good appetite. Patient reported that her thoughts are more organized than on admission. Reported some dizziness yesterday, reported some improvement in it today.  
Chart reviewed, including vital signs, medication administration record, lab results, and nursing notes.   Case discussed with nursing, psychology, psych rehab, and social work in team meeting.   - The patient was seen in the fontenot, growing increasingly agitated and attempting to elope from the unit. She refused to move from the door, and PES were called, who assisted in initiating restraints and administering PRN medication.    Patient is seen in the group room under no acute distress and amenable to interview. The patient continues to be discharge-focused. She acknowledges that it is possible she has schizophrenia, given that she has a family history of the illness. However, she denies that she was experiencing hallucinations or delusions, and that she was being persecuted by someone, possibly her school’s housing board.  However, she is preoccupied with being discharged and returning to school. She feels that her parents have betrayed her by not disclosing to her she would be brought to the hospital. She insists that she can receive mental health care outside the hospital. Reports decreased sleep and decreased appetite. Reports no medication side effects. Denies SI/HI/AVH.
Chart reviewed, including vital signs, medication administration record, lab results, and nursing notes.   Case discussed with nursing, psychology, psych rehab, and social work in team meeting.   - No acute events overnight    Patient is seen in the group room under no acute distress and amenable to interview. The patient acknowledges that starting last year, she experienced symptoms of paranoia, believing that strangers and her friends have been targeting her. She exhibits delusions of reference, believing that strangers pointing at a phone  in the store was a criticism of her. Her symptoms worsened about two weeks ago, when she smoked a substance provided to her by students at her college. She was told this was cannabis but reports that it felt different. She subsequently experienced auditory hallucinations of an accusatory voice coming from the vent. She does not believe that this was due to her state of mind, but rather someone was targeting her using a recording. She also reports poor academic performance recently, due to excessive procrastination.  She denies that Abilify has made any difference in her symptoms so far, though she reports mild blurry vision since starting it. Patient reports stable sleep and appetite. Denies SI/HI/AVH. Reports no medication side effects.  
Chart reviewed. Case discussed in team. No acute events overnight, patient adherent with meds, tolerating well. Had incident over the weekend related to concerns she had that her notebook was taken, during which she became extremely agitated and ultimately required restraints. No further instances of agitation since that episode. Patient denies side effects, denies stiffness or akathisia. Patient reports she feels she is thinking a bit clearer and denies any AH since admit. Still with some paranoia re signing of consents and now requests STI testing. Denies SI/HI. 
Patient seen for follow up of psychosis  Chart reviewed and case discussed with nursing staff   Patient reports significant improvement and feels better"  Denies SE
Chart reviewed, including vital signs, medication administration record, lab results, and nursing notes.   Case discussed with nursing, psychology, psych rehab, and social work in team meeting.   - Continued reports of disorganization and mild paranoia    Patient is seen in the group room under no acute distress and amenable to interview. The patient states that she was upset at her parents because they brought her food but no other belongings. She also feels guilty that her mother brought her food when her siblings don’t have enough to eat. The patient states that Risperdal helps with her focus, but she continues to complain of side effects. She reports feeling “foggy“. Report stable sleep and appetite. Denies SI/HI/AVH. We discussed the plan to reduce the dose of Klonopin and discontinue Benadryl.
Patient seen for follow up of psychosis, chart reviewed, and case discussed with treatment team.  No events reported overnight.  The patient states she didn't like the way taking Abilify during the day made her feel.  She felt sedated and "out of it."  Requesting to return to .  She was able to sleep much better last night with trazodone.  The patient continues to show slow improvement in symptoms.  She feels paranoia is getting better.  When asked directly about past delusions, she still questions them, but was able to reality test and question them to some degree.  She denies any current paranoia on the unit.  The patient states mood is okay, denies any SI.  Behavior has been well controlled.  The patient has been more visible on the unit, attending groups.  Encouraged her to continue to do so.  The patient reports eating well.
Chart reviewed, including vital signs, medication administration record, lab results, and nursing notes.   Case discussed with nursing, psychology, psych rehab, and social work in team meeting.   - 1x PRN for anxiety yesterday    Patient is seen in the group room under no acute distress and amenable to interview. The patient expresses her paranoia against numerous staff members, including a  who “triggered her trauma“ by asking her about three words to describe herself. She felt that the words that she came up with were “all negative“. She also expresses her complaint that other staff were “passive aggressive“ against her. She continues to insist that she is “not crazy” and that she can provide evidence that other people heard screaming coming from an air vent. She requests a therapy dog. We discussed a plan to switch her antipsychotic from Abilify to Risperdal. Patient reports stable sleep and appetite. Denies SI/HI/AVH. Reports no medication side effects.
Chart reviewed, including vital signs, medication administration record, lab results, and nursing notes.   Case discussed with nursing, psychology, psych rehab, and social work in team meeting.   - Reported symptoms of depression    Patient is seen in the group room under no acute distress and amenable to interview. Patient states that she is “up and down“ today. She is responding well to the new medication. She currently denies any symptoms of psychosis including delusions, paranoia, hallucinations, or ideas of reference. However, she continues to insist that she was hearing voices coming from a vent and that her roommates heard it as well. She also believes that she was being tracked through the [employer] algorithm but denies that anyone specific is tracking her. She acknowledges visits by her family, and states that her mother feels she is much improved. We discussed the plan to decrease the dose of Abilify. Patient reports stable sleep and appetite. Denies SI/HI/AVH. Reports no medication side effects.
f/up psychosis, vitals --BP low end --100/60, asymptomatic, Patient reported interrupted sleep due to the checks and with fair appetite. Patient reported improved motivation, energy level same. denied feeling hopeless or helpless. When questioned patient about devices at home, patient denied that computers and phone are tapped. Patient reported that the content that pops up on her phone are based on her interests. 
Chart reviewed, including vital signs, medication administration record, lab results, and nursing notes.   Case discussed with nursing, psychology, psych rehab, and social work in team meeting.   - Patient complaining of vaginal discharge    Patient is seen in the group room under no acute distress and amenable to interview. The patient reports feeling “triggered” by another patient who has scars on her arms. This is because she used to engage in self-injurious behavior by cutting herself as well as inappropriate sexual behavior. She reports a five-day history of vaginal discharge, which date she describes as gray colored with a smell. She agrees to see a hospitalist to address this. She reports no medication side effects. Reports stable sleep and appetite. Denies SI/HI/AVH.
Chart reviewed, including vital signs, medication administration record, lab results, and nursing notes.   Case discussed with nursing, psychology, psych rehab, and social work in team meeting.   - Staff report the patient expressing paranoia and referential thoughts in the evening    Patient is seen in the group room under no acute distress and amenable to interview. Patient states that she felt dysregulated last night because other patients were talking about [facility]. This made her wonder if she has completed her income taxes this year. She finds that such “triggering topics“ cause her to feel anxious, but she can leave the conversation when they occur. She acknowledges that she still feels a little paranoid. She requests a Pap smear, because she missed an appointment while in the hospital. She complains of some mild blurry vision, but otherwise denies medication side effects. Reports stable sleep and appetite. Denies SI/HI/AVH.

## 2023-11-02 NOTE — BH INPATIENT PSYCHIATRY PROGRESS NOTE - NSTXPROBIMPULS_PSY_ALL_CORE
IMPULSIVITY/AGITATION

## 2023-11-02 NOTE — BH INPATIENT PSYCHIATRY PROGRESS NOTE - NSTXCOPEINTERMD_PSY_ALL_CORE
I/G/M therapy

## 2023-11-02 NOTE — BH INPATIENT PSYCHIATRY PROGRESS NOTE - NSTXPROBPSYCHO_PSY_ALL_CORE
PSYCHOTIC SYMPTOMS

## 2023-11-02 NOTE — BH INPATIENT PSYCHIATRY PROGRESS NOTE - NSTXPSYCHOINTERMD_PSY_ALL_CORE
Abilify trial
Psychoeducation  Psychopharmacology
Abilify trial
Psychoeducation  Psychopharmacology
Psychoeducation  Psychopharmacology
Abilify trial
Psychoeducation  Psychopharmacology
Abilify trial
Psychoeducation  Psychopharmacology
Abilify trial
Psychoeducation  Psychopharmacology
Abilify trial

## 2023-11-02 NOTE — BH INPATIENT PSYCHIATRY PROGRESS NOTE - NSTXDCOPLKPROGRES_PSY_ALL_CORE
Met - goal discontinued
Worsening
No Change
Worsening
No Change
Worsening
No Change
Worsening
No Change
Worsening
No Change
Worsening
No Change
Worsening
Worsening
No Change

## 2023-11-02 NOTE — BH INPATIENT PSYCHIATRY PROGRESS NOTE - NSTXIMPULSINTERMD_PSY_ALL_CORE
I/G/M therapy + medication

## 2023-11-02 NOTE — BH PSYCHOLOGY - GROUP THERAPY NOTE - NSBHPSYCHOLPARTICIP_PSY_A_CORE
Fully engaged
Partially engaged
Fully engaged
Fully engaged
Partially engaged
Fully engaged
Partially engaged
Partially engaged
Fully engaged
Partially engaged
Fully engaged
Partially engaged
Fully engaged

## 2023-11-02 NOTE — BH INPATIENT PSYCHIATRY PROGRESS NOTE - NSTXDISORGINTERMD_PSY_ALL_CORE
Abilify trial

## 2023-11-02 NOTE — BH PSYCHOLOGY - GROUP THERAPY NOTE - NSBHPSYCHOLGRPTYPE_PSY_A_CORE
DBT Life Skills
Cognitive Behavioral Coping Skills
DBT Life Skills
Cognitive Behavioral Coping Skills
DBT Life Skills

## 2023-11-02 NOTE — BH INPATIENT PSYCHIATRY PROGRESS NOTE - NSCGISEVERILLNESS_PSY_ALL_CORE
5 = Markedly ill - intrusive symptoms that distinctly impair social/occupational function or cause intrusive levels of distress
4 = Moderately ill – overt symptoms causing noticeable, but modest, functional impairment or distress; symptom level may warrant medication
3 = Mildly ill – clearly established symptoms with minimal, if any, distress or difficulty in social and occupational function
6 = Severely ill - disruptive pathology, behavior and function are frequently influenced by symptoms, may require assistance from others
4 = Moderately ill – overt symptoms causing noticeable, but modest, functional impairment or distress; symptom level may warrant medication
4 = Moderately ill – overt symptoms causing noticeable, but modest, functional impairment or distress; symptom level may warrant medication
6 = Severely ill - disruptive pathology, behavior and function are frequently influenced by symptoms, may require assistance from others
5 = Markedly ill - intrusive symptoms that distinctly impair social/occupational function or cause intrusive levels of distress
3 = Mildly ill – clearly established symptoms with minimal, if any, distress or difficulty in social and occupational function
5 = Markedly ill - intrusive symptoms that distinctly impair social/occupational function or cause intrusive levels of distress
3 = Mildly ill – clearly established symptoms with minimal, if any, distress or difficulty in social and occupational function
3 = Mildly ill – clearly established symptoms with minimal, if any, distress or difficulty in social and occupational function
5 = Markedly ill - intrusive symptoms that distinctly impair social/occupational function or cause intrusive levels of distress
4 = Moderately ill – overt symptoms causing noticeable, but modest, functional impairment or distress; symptom level may warrant medication
3 = Mildly ill – clearly established symptoms with minimal, if any, distress or difficulty in social and occupational function
4 = Moderately ill – overt symptoms causing noticeable, but modest, functional impairment or distress; symptom level may warrant medication
5 = Markedly ill - intrusive symptoms that distinctly impair social/occupational function or cause intrusive levels of distress

## 2023-11-02 NOTE — BH INPATIENT PSYCHIATRY PROGRESS NOTE - NSBHATTESTTYPEVISIT_PSY_A_CORE
Attending Only
EPHRAIM without on-site Attending supervision
EPHRAIM without on-site Attending supervision
Attending Only
EPHRAIM without on-site Attending supervision
Attending Only

## 2023-11-02 NOTE — BH INPATIENT PSYCHIATRY PROGRESS NOTE - NSDCCRITERIA_PSY_ALL_CORE
CGI <= 2

## 2023-11-02 NOTE — BH PSYCHOLOGY - GROUP THERAPY NOTE - NSBHPSYCHOLRESPONSE_PSY_A_CORE
Accepted support
Coping skills acquired/Accepted support
Accepted support
Coping skills acquired/Insight displayed/Accepted support
Accepted support
Accepted support

## 2023-11-02 NOTE — BH INPATIENT PSYCHIATRY PROGRESS NOTE - NSBHFUPINTERVALCCFT_PSY_A_CORE
"I feel good". 
Psychosis
I'm not sure what happened yesterday.
"I feel good". 
"I'm thinking better"
"I am definitely feeling better ". 
"I feel good". 
"I'm upset about my diagnosis"
Psychosis
"Everyone thinks I'm crazy"
reported feeling "good, much better". 
"Everyone thinks I'm crazy"
Psychosis
"I feel good". 
sleeping
Im feeling a little better, Pt seen for follow up for psychosis
Psychosis
"I am feeling better but would rather be on just one medication ". 
Im feeling a little better, Pt seen for follow up for psychosis
Seen for follow up of psychosis.
reported feeling "depressed". 
Psychosis
"I feel good". 
"I feel good". 
"I'm thinking better"

## 2023-11-02 NOTE — BH PSYCHOLOGY - GROUP THERAPY NOTE - NSBHPSYCHOLASSESSPROV_PSY_A_CORE
Licensed Psychologist and Psychology Trainee
Licensed Psychologist
Licensed Psychologist and Psychology Trainee
Licensed Psychologist and Psychology Trainee
Licensed Psychologist
Licensed Psychologist and Psychology Trainee
Licensed Psychologist
Licensed Psychologist and Psychology Trainee
Licensed Psychologist
Licensed Psychologist and Psychology Trainee
Licensed Psychologist
Licensed Psychologist and Psychology Trainee
Licensed Psychologist
Licensed Psychologist
Licensed Psychologist and Psychology Trainee

## 2023-11-02 NOTE — BH INPATIENT PSYCHIATRY PROGRESS NOTE - NSCGIIMPROVESX_PSY_ALL_CORE
4 = No change - symptoms remain essentially unchanged
5 = Minimally worse - slightly worse but may not be clinically meaningful; may represent very little change in basic clinical status or functional capacity
3 = Minimally improved - slightly better with little or no clinically meaningful reduction of symptoms.  Represents very little change in basic clinical status, level of care, or functional capacity.
5 = Minimally worse - slightly worse but may not be clinically meaningful; may represent very little change in basic clinical status or functional capacity
5 = Minimally worse - slightly worse but may not be clinically meaningful; may represent very little change in basic clinical status or functional capacity
3 = Minimally improved - slightly better with little or no clinically meaningful reduction of symptoms.  Represents very little change in basic clinical status, level of care, or functional capacity.
2 = Much improved - notably better with signficant reduction of symptoms; increase in the level of functioning but some symptoms remain
3 = Minimally improved - slightly better with little or no clinically meaningful reduction of symptoms.  Represents very little change in basic clinical status, level of care, or functional capacity.
3 = Minimally improved - slightly better with little or no clinically meaningful reduction of symptoms.  Represents very little change in basic clinical status, level of care, or functional capacity.
5 = Minimally worse - slightly worse but may not be clinically meaningful; may represent very little change in basic clinical status or functional capacity
3 = Minimally improved - slightly better with little or no clinically meaningful reduction of symptoms.  Represents very little change in basic clinical status, level of care, or functional capacity.
4 = No change - symptoms remain essentially unchanged
3 = Minimally improved - slightly better with little or no clinically meaningful reduction of symptoms.  Represents very little change in basic clinical status, level of care, or functional capacity.
5 = Minimally worse - slightly worse but may not be clinically meaningful; may represent very little change in basic clinical status or functional capacity
3 = Minimally improved - slightly better with little or no clinically meaningful reduction of symptoms.  Represents very little change in basic clinical status, level of care, or functional capacity.

## 2023-11-02 NOTE — BH INPATIENT PSYCHIATRY PROGRESS NOTE - NSBHANTIPSYCHOTIC_PSY_ALL_CORE
Yes...

## 2023-11-02 NOTE — BH INPATIENT PSYCHIATRY PROGRESS NOTE - NSTXDCOPLKDATEEST_PSY_ALL_CORE
09-Oct-2023
16-Oct-2023
23-Oct-2023
16-Oct-2023
16-Oct-2023
23-Oct-2023
16-Oct-2023
23-Oct-2023
23-Oct-2023
09-Oct-2023
23-Oct-2023
16-Oct-2023
09-Oct-2023
23-Oct-2023
09-Oct-2023
16-Oct-2023
23-Oct-2023
16-Oct-2023
09-Oct-2023
23-Oct-2023

## 2023-11-02 NOTE — BH INPATIENT PSYCHIATRY PROGRESS NOTE - NSBHFUPMEDSE_PSY_A_CORE
None known
None known
Yes
None known
Yes
None known

## 2023-11-02 NOTE — BH PSYCHOLOGY - GROUP THERAPY NOTE - NSBHPSYCHOLGRPDUR_PSY_A_CORE
45 minutes

## 2023-11-02 NOTE — BH INPATIENT PSYCHIATRY PROGRESS NOTE - NSTXPSYCHODATEEST_PSY_ALL_CORE
06-Oct-2023
02-Nov-2023
06-Oct-2023

## 2023-11-02 NOTE — BH INPATIENT PSYCHIATRY PROGRESS NOTE - NSBHCONSULTIPREASON_PSY_A_CORE
danger to self; mental illness expected to respond to inpatient care
other reason
danger to self; mental illness expected to respond to inpatient care

## 2023-11-02 NOTE — BH INPATIENT PSYCHIATRY PROGRESS NOTE - NSTXDCOPLKDATETRGT_PSY_ALL_CORE
30-Oct-2023
23-Oct-2023
30-Oct-2023
23-Oct-2023
30-Oct-2023
23-Oct-2023
23-Oct-2023
30-Oct-2023
16-Oct-2023
30-Oct-2023
16-Oct-2023
23-Oct-2023
30-Oct-2023
16-Oct-2023
23-Oct-2023
23-Oct-2023
16-Oct-2023
30-Oct-2023
16-Oct-2023

## 2023-11-02 NOTE — BH INPATIENT PSYCHIATRY PROGRESS NOTE - NSBHMETABOLIC_PSY_ALL_CORE_FT
BMI: BMI (kg/m2): 21.3 (10-05-23 @ 23:14)  HbA1c: A1C with Estimated Average Glucose Result: 5.0 % (10-10-23 @ 08:35)    Glucose:   BP: 115/75 (10-30-23 @ 07:57) (115/75 - 115/75)  Lipid Panel: Date/Time: 10-10-23 @ 08:35  Cholesterol, Serum: 176  Direct LDL: --  HDL Cholesterol, Serum: 65  Total Cholesterol/HDL Ration Measurement: --  Triglycerides, Serum: 56  
BMI: BMI (kg/m2): 21.3 (10-05-23 @ 23:14)  HbA1c: A1C with Estimated Average Glucose Result: 5.0 % (10-10-23 @ 08:35)    Glucose:   BP: 97/60 (10-18-23 @ 06:38) (97/60 - 111/72)  Lipid Panel: Date/Time: 10-10-23 @ 08:35  Cholesterol, Serum: 176  Direct LDL: --  HDL Cholesterol, Serum: 65  Total Cholesterol/HDL Ration Measurement: --  Triglycerides, Serum: 56  
BMI: BMI (kg/m2): 21.3 (10-05-23 @ 23:14)  HbA1c: A1C with Estimated Average Glucose Result: 5.0 % (10-10-23 @ 08:35)    Glucose:   BP: 97/60 (10-18-23 @ 06:38) (97/60 - 97/60)  Lipid Panel: Date/Time: 10-10-23 @ 08:35  Cholesterol, Serum: 176  Direct LDL: --  HDL Cholesterol, Serum: 65  Total Cholesterol/HDL Ration Measurement: --  Triglycerides, Serum: 56  
BMI: BMI (kg/m2): 21.3 (10-05-23 @ 23:14)  HbA1c:   Glucose:   BP: 103/69 (10-06-23 @ 07:41) (103/69 - 136/94)  Lipid Panel: 
BMI: BMI (kg/m2): 21.3 (10-05-23 @ 23:14)  HbA1c: A1C with Estimated Average Glucose Result: 5.0 % (10-10-23 @ 08:35)    Glucose:   BP: 107/68 (10-23-23 @ 06:47) (107/68 - 160/70)  Lipid Panel: Date/Time: 10-10-23 @ 08:35  Cholesterol, Serum: 176  Direct LDL: --  HDL Cholesterol, Serum: 65  Total Cholesterol/HDL Ration Measurement: --  Triglycerides, Serum: 56  
BMI: BMI (kg/m2): 21.3 (10-05-23 @ 23:14)  HbA1c: A1C with Estimated Average Glucose Result: 5.0 % (10-10-23 @ 08:35)    Glucose:   BP: 103/71 (10-09-23 @ 07:49) (103/71 - 110/72)  Lipid Panel: Date/Time: 10-10-23 @ 08:35  Cholesterol, Serum: 176  Direct LDL: --  HDL Cholesterol, Serum: 65  Total Cholesterol/HDL Ration Measurement: --  Triglycerides, Serum: 56  
BMI: BMI (kg/m2): 21.3 (10-05-23 @ 23:14)  HbA1c: A1C with Estimated Average Glucose Result: 5.0 % (10-10-23 @ 08:35)    Glucose:   BP: 109/60 (10-27-23 @ 08:25) (109/60 - 109/60)  Lipid Panel: Date/Time: 10-10-23 @ 08:35  Cholesterol, Serum: 176  Direct LDL: --  HDL Cholesterol, Serum: 65  Total Cholesterol/HDL Ration Measurement: --  Triglycerides, Serum: 56  
BMI: BMI (kg/m2): 21.3 (10-05-23 @ 23:14)  HbA1c: A1C with Estimated Average Glucose Result: 5.0 % (10-10-23 @ 08:35)    Glucose:   BP: 111/72 (10-17-23 @ 06:38) (111/72 - 112/69)  Lipid Panel: Date/Time: 10-10-23 @ 08:35  Cholesterol, Serum: 176  Direct LDL: --  HDL Cholesterol, Serum: 65  Total Cholesterol/HDL Ration Measurement: --  Triglycerides, Serum: 56  
BMI: BMI (kg/m2): 21.3 (10-05-23 @ 23:14)  HbA1c: A1C with Estimated Average Glucose Result: 5.0 % (10-10-23 @ 08:35)    Glucose:   BP: 100/61 (10-21-23 @ 08:04) (100/61 - 100/61)  Lipid Panel: Date/Time: 10-10-23 @ 08:35  Cholesterol, Serum: 176  Direct LDL: --  HDL Cholesterol, Serum: 65  Total Cholesterol/HDL Ration Measurement: --  Triglycerides, Serum: 56  
BMI: BMI (kg/m2): 21.3 (10-05-23 @ 23:14)  HbA1c: A1C with Estimated Average Glucose Result: 5.0 % (10-10-23 @ 08:35)    Glucose:   BP: 107/68 (10-23-23 @ 06:47) (100/61 - 160/70)  Lipid Panel: Date/Time: 10-10-23 @ 08:35  Cholesterol, Serum: 176  Direct LDL: --  HDL Cholesterol, Serum: 65  Total Cholesterol/HDL Ration Measurement: --  Triglycerides, Serum: 56  
BMI: BMI (kg/m2): 21.3 (10-05-23 @ 23:14)  HbA1c: A1C with Estimated Average Glucose Result: 5.0 % (10-10-23 @ 08:35)    Glucose:   BP: 109/60 (10-27-23 @ 08:25) (109/60 - 109/60)  Lipid Panel: Date/Time: 10-10-23 @ 08:35  Cholesterol, Serum: 176  Direct LDL: --  HDL Cholesterol, Serum: 65  Total Cholesterol/HDL Ration Measurement: --  Triglycerides, Serum: 56  
BMI: BMI (kg/m2): 21.3 (10-05-23 @ 23:14)  HbA1c:   Glucose:   BP: 110/72 (10-08-23 @ 08:11) (103/69 - 136/94)  Lipid Panel: 
BMI: BMI (kg/m2): 21.3 (10-05-23 @ 23:14)  HbA1c: A1C with Estimated Average Glucose Result: 5.0 % (10-10-23 @ 08:35)    Glucose:   BP: 103/71 (10-09-23 @ 07:49) (103/71 - 103/71)  Lipid Panel: Date/Time: 10-10-23 @ 08:35  Cholesterol, Serum: 176  Direct LDL: --  HDL Cholesterol, Serum: 65  Total Cholesterol/HDL Ration Measurement: --  Triglycerides, Serum: 56  
BMI: BMI (kg/m2): 21.3 (10-05-23 @ 23:14)  HbA1c: A1C with Estimated Average Glucose Result: 5.0 % (10-10-23 @ 08:35)    Glucose:   BP: 110/67 (11-02-23 @ 07:56) (97/63 - 115/66)  Lipid Panel: Date/Time: 10-10-23 @ 08:35  Cholesterol, Serum: 176  Direct LDL: --  HDL Cholesterol, Serum: 65  Total Cholesterol/HDL Ration Measurement: --  Triglycerides, Serum: 56  
BMI: BMI (kg/m2): 21.3 (10-05-23 @ 23:14)  HbA1c: A1C with Estimated Average Glucose Result: 5.0 % (10-10-23 @ 08:35)    Glucose:   BP: --  Lipid Panel: Date/Time: 10-10-23 @ 08:35  Cholesterol, Serum: 176  Direct LDL: --  HDL Cholesterol, Serum: 65  Total Cholesterol/HDL Ration Measurement: --  Triglycerides, Serum: 56  
BMI: BMI (kg/m2): 21.3 (10-05-23 @ 23:14)  HbA1c: A1C with Estimated Average Glucose Result: 5.0 % (10-10-23 @ 08:35)    Glucose:   BP: 112/69 (10-15-23 @ 06:45) (107/69 - 112/69)  Lipid Panel: Date/Time: 10-10-23 @ 08:35  Cholesterol, Serum: 176  Direct LDL: --  HDL Cholesterol, Serum: 65  Total Cholesterol/HDL Ration Measurement: --  Triglycerides, Serum: 56  
BMI: BMI (kg/m2): 21.3 (10-05-23 @ 23:14)  HbA1c: A1C with Estimated Average Glucose Result: 5.0 % (10-10-23 @ 08:35)    Glucose:   BP: 109/60 (10-27-23 @ 08:25) (109/60 - 109/60)  Lipid Panel: Date/Time: 10-10-23 @ 08:35  Cholesterol, Serum: 176  Direct LDL: --  HDL Cholesterol, Serum: 65  Total Cholesterol/HDL Ration Measurement: --  Triglycerides, Serum: 56  
BMI: BMI (kg/m2): 21.3 (10-05-23 @ 23:14)  HbA1c: A1C with Estimated Average Glucose Result: 5.0 % (10-10-23 @ 08:35)    Glucose:   BP: 107/68 (10-23-23 @ 06:47) (107/68 - 116/67)  Lipid Panel: Date/Time: 10-10-23 @ 08:35  Cholesterol, Serum: 176  Direct LDL: --  HDL Cholesterol, Serum: 65  Total Cholesterol/HDL Ration Measurement: --  Triglycerides, Serum: 56  
BMI: BMI (kg/m2): 21.3 (10-05-23 @ 23:14)  HbA1c: A1C with Estimated Average Glucose Result: 5.0 % (10-10-23 @ 08:35)    Glucose:   BP: 118/78 (10-12-23 @ 06:19) (118/78 - 118/78)  Lipid Panel: Date/Time: 10-10-23 @ 08:35  Cholesterol, Serum: 176  Direct LDL: --  HDL Cholesterol, Serum: 65  Total Cholesterol/HDL Ration Measurement: --  Triglycerides, Serum: 56  
BMI: BMI (kg/m2): 21.3 (10-05-23 @ 23:14)  HbA1c:   Glucose:   BP: 103/71 (10-09-23 @ 07:49) (103/71 - 110/72)  Lipid Panel: 
BMI: BMI (kg/m2): 21.3 (10-05-23 @ 23:14)  HbA1c: A1C with Estimated Average Glucose Result: 5.0 % (10-10-23 @ 08:35)    Glucose:   BP: 97/60 (10-18-23 @ 06:38) (97/60 - 111/72)  Lipid Panel: Date/Time: 10-10-23 @ 08:35  Cholesterol, Serum: 176  Direct LDL: --  HDL Cholesterol, Serum: 65  Total Cholesterol/HDL Ration Measurement: --  Triglycerides, Serum: 56  
BMI: BMI (kg/m2): 21.3 (10-05-23 @ 23:14)  HbA1c: A1C with Estimated Average Glucose Result: 5.0 % (10-10-23 @ 08:35)    Glucose:   BP: 115/66 (10-31-23 @ 07:48) (115/66 - 115/75)  Lipid Panel: Date/Time: 10-10-23 @ 08:35  Cholesterol, Serum: 176  Direct LDL: --  HDL Cholesterol, Serum: 65  Total Cholesterol/HDL Ration Measurement: --  Triglycerides, Serum: 56  
BMI: BMI (kg/m2): 21.3 (10-05-23 @ 23:14)  HbA1c: A1C with Estimated Average Glucose Result: 5.0 % (10-10-23 @ 08:35)    Glucose:   BP: 160/70 (10-22-23 @ 06:42) (100/61 - 160/70)  Lipid Panel: Date/Time: 10-10-23 @ 08:35  Cholesterol, Serum: 176  Direct LDL: --  HDL Cholesterol, Serum: 65  Total Cholesterol/HDL Ration Measurement: --  Triglycerides, Serum: 56  
BMI: BMI (kg/m2): 21.3 (10-05-23 @ 23:14)  HbA1c: A1C with Estimated Average Glucose Result: 5.0 % (10-10-23 @ 08:35)    Glucose:   BP: 118/78 (10-12-23 @ 06:19) (118/78 - 118/78)  Lipid Panel: Date/Time: 10-10-23 @ 08:35  Cholesterol, Serum: 176  Direct LDL: --  HDL Cholesterol, Serum: 65  Total Cholesterol/HDL Ration Measurement: --  Triglycerides, Serum: 56  
BMI: BMI (kg/m2): 21.3 (10-05-23 @ 23:14)  HbA1c: A1C with Estimated Average Glucose Result: 5.0 % (10-10-23 @ 08:35)    Glucose:   BP: 97/63 (11-01-23 @ 07:44) (97/63 - 115/75)  Lipid Panel: Date/Time: 10-10-23 @ 08:35  Cholesterol, Serum: 176  Direct LDL: --  HDL Cholesterol, Serum: 65  Total Cholesterol/HDL Ration Measurement: --  Triglycerides, Serum: 56

## 2023-11-02 NOTE — BH INPATIENT PSYCHIATRY PROGRESS NOTE - NSTXDISORGGOAL_PSY_ALL_CORE
Will identify 2 coping skills that assist in organizing

## 2023-11-02 NOTE — BH INPATIENT PSYCHIATRY PROGRESS NOTE - NSBHATTESTBILLING_PSY_A_CORE
73268-Omtfszenye OBS or IP - moderate complexity OR 35-49 mins
66514-Govmoragcf OBS or IP - moderate complexity OR 35-49 mins
84431-Lnchuxziad OBS or IP - moderate complexity OR 35-49 mins
31149-Nehieevqid OBS or IP - low complexity OR 25-34 mins
88024-Jxlnmcusox OBS or IP - moderate complexity OR 35-49 mins
27072-Luaxvrglno OBS or IP - moderate complexity OR 35-49 mins
71774-Dpozxehmvw OBS or IP - moderate complexity OR 35-49 mins
30055-Jzyjdlcvnk OBS or IP - moderate complexity OR 35-49 mins
45957-Pqfnjpeljl OBS or IP - moderate complexity OR 35-49 mins
18901-Yewgkojpyz OBS or IP - low complexity OR 25-34 mins
70100-Vhvtvgbkka OBS or IP - moderate complexity OR 35-49 mins
26398-Foczrjxzdz OBS or IP - moderate complexity OR 35-49 mins
20420-Aiqcswxtcl OBS or IP - moderate complexity OR 35-49 mins
44610-Cbtsfexore OBS or IP - moderate complexity OR 35-49 mins
17361-Eyvquentuo OBS or IP - moderate complexity OR 35-49 mins
91988-Gcurauuqkc OBS or IP - moderate complexity OR 35-49 mins
86457-Pjeceyykgl OBS or IP - moderate complexity OR 35-49 mins
09787-Juubkgbzyf OBS or IP - high complexity OR 50-79 mins
85245-Rjcldzynuw OBS or IP - moderate complexity OR 35-49 mins
49662-Aajvrmvefx OBS or IP - moderate complexity OR 35-49 mins
95428-Ddxthgfkfn OBS or IP - moderate complexity OR 35-49 mins
44714-Gdwmqaefbp OBS or IP - moderate complexity OR 35-49 mins
47406-Vmnttzjjtw OBS or IP - moderate complexity OR 35-49 mins
39263-Gmrpfbdjbx OBS or IP - high complexity OR 50-79 mins
13017-Ouapduptxa OBS or IP - moderate complexity OR 35-49 mins

## 2023-11-02 NOTE — BH INPATIENT PSYCHIATRY PROGRESS NOTE - NSBHCHARTREVIEWVS_PSY_A_CORE FT
Vital Signs Last 24 Hrs  T(C): 36.1 (11-02-23 @ 07:56), Max: 36.7 (11-01-23 @ 20:40)  T(F): 96.9 (11-02-23 @ 07:56), Max: 98.1 (11-01-23 @ 20:40)  HR: 81 (11-02-23 @ 07:56) (81 - 81)  BP: 110/67 (11-02-23 @ 07:56) (110/67 - 110/67)  BP(mean): --  RR: 14 (11-02-23 @ 07:56) (14 - 14)  SpO2: --    
Vital Signs Last 24 Hrs  T(C): 36.6 (10-10-23 @ 06:39), Max: 36.6 (10-09-23 @ 20:38)  T(F): 97.9 (10-10-23 @ 06:39), Max: 97.9 (10-09-23 @ 20:38)  HR: --  BP: --  BP(mean): --  RR: --  SpO2: --    Orthostatic VS  10-10-23 @ 06:39  Lying BP: --/-- HR: --  Sitting BP: 104/67 HR: 69  Standing BP: 107/70 HR: 67  Site: --  Mode: --  
Vital Signs Last 24 Hrs  T(C): 36.8 (11-01-23 @ 07:44), Max: 36.8 (10-31-23 @ 20:07)  T(F): 98.3 (11-01-23 @ 07:44), Max: 98.3 (10-31-23 @ 20:07)  HR: 78 (11-01-23 @ 07:44) (78 - 78)  BP: 97/63 (11-01-23 @ 07:44) (97/63 - 97/63)  BP(mean): --  RR: --  SpO2: --    
Vital Signs Last 24 Hrs  T(C): 36.2 (10-25-23 @ 06:16), Max: 36.6 (10-24-23 @ 20:13)  T(F): 97.1 (10-25-23 @ 06:16), Max: 97.9 (10-24-23 @ 20:13)  HR: --  BP: --  BP(mean): --  RR: 18 (10-25-23 @ 06:16) (18 - 18)  SpO2: --    Orthostatic VS  10-25-23 @ 06:16  Lying BP: --/-- HR: --  Sitting BP: 92/62 HR: 88  Standing BP: --/-- HR: --  Site: --  Mode: --  
Vital Signs Last 24 Hrs  T(C): 36.7 (10-27-23 @ 08:25), Max: 36.7 (10-27-23 @ 08:25)  T(F): 98.1 (10-27-23 @ 08:25), Max: 98.1 (10-27-23 @ 08:25)  HR: 86 (10-27-23 @ 08:25) (86 - 86)  BP: 109/60 (10-27-23 @ 08:25) (109/60 - 109/60)  BP(mean): --  RR: 16 (10-27-23 @ 08:25) (16 - 16)  SpO2: --    Orthostatic VS  10-26-23 @ 06:18  Lying BP: --/-- HR: --  Sitting BP: 104/78 HR: 70  Standing BP: --/-- HR: --  Site: --  Mode: --  
Vital Signs Last 24 Hrs  T(C): 36.2 (10-22-23 @ 06:42), Max: 36.9 (10-21-23 @ 20:50)  T(F): 97.1 (10-22-23 @ 06:42), Max: 98.5 (10-21-23 @ 20:50)  HR: 88 (10-22-23 @ 06:42) (88 - 88)  BP: 160/70 (10-22-23 @ 06:42) (160/70 - 160/70)  BP(mean): --  RR: --  SpO2: --    
Vital Signs Last 24 Hrs  T(C): 36.8 (10-28-23 @ 08:27), Max: 36.8 (10-28-23 @ 08:27)  T(F): 98.3 (10-28-23 @ 08:27), Max: 98.3 (10-28-23 @ 08:27)  HR: --  BP: --  BP(mean): --  RR: 16 (10-28-23 @ 08:27) (16 - 16)  SpO2: --    Orthostatic VS  10-28-23 @ 08:27  Lying BP: --/-- HR: --  Sitting BP: 106/60 HR: 75  Standing BP: --/-- HR: --  Site: --  Mode: --  
Vital Signs Last 24 Hrs  T(C): 36.2 (10-12-23 @ 06:19), Max: 36.8 (10-11-23 @ 20:40)  T(F): 97.2 (10-12-23 @ 06:19), Max: 98.3 (10-11-23 @ 20:40)  HR: --  BP: 118/78 (10-12-23 @ 06:19) (118/78 - 118/78)  BP(mean): 80 (10-12-23 @ 06:19) (80 - 80)  RR: 17 (10-12-23 @ 06:19) (17 - 17)  SpO2: --    Orthostatic VS  10-11-23 @ 06:43  Lying BP: --/-- HR: --  Sitting BP: 105/70 HR: 70  Standing BP: --/-- HR: --  Site: --  Mode: --  
Vital Signs Last 24 Hrs  T(C): 36.7 (10-31-23 @ 07:48), Max: 36.7 (10-31-23 @ 07:48)  T(F): 98 (10-31-23 @ 07:48), Max: 98 (10-31-23 @ 07:48)  HR: 90 (10-31-23 @ 07:48) (90 - 90)  BP: 115/66 (10-31-23 @ 07:48) (115/66 - 115/66)  BP(mean): --  RR: --  SpO2: --    
Vital Signs Last 24 Hrs  T(C): 36.4 (10-08-23 @ 08:11), Max: 36.4 (10-07-23 @ 20:47)  T(F): 97.5 (10-08-23 @ 08:11), Max: 97.5 (10-07-23 @ 20:47)  HR: --  BP: 110/72 (10-08-23 @ 08:11) (110/72 - 110/72)  BP(mean): 69 (10-08-23 @ 08:11) (69 - 69)  RR: 16 (10-08-23 @ 08:11) (16 - 16)  SpO2: --    Orthostatic VS  10-07-23 @ 06:43  Lying BP: --/-- HR: --  Sitting BP: 107/66 HR: 69  Standing BP: --/-- HR: --  Site: --  Mode: --  
Vital Signs Last 24 Hrs  T(C): 36.4 (10-29-23 @ 08:13), Max: 36.6 (10-28-23 @ 20:16)  T(F): 97.6 (10-29-23 @ 08:13), Max: 97.9 (10-28-23 @ 20:16)  HR: 18 (10-29-23 @ 08:13) (18 - 18)  BP: --  BP(mean): --  RR: --  SpO2: --    Orthostatic VS  10-29-23 @ 08:13  Lying BP: --/-- HR: --  Sitting BP: 119/75 HR: 93  Standing BP: --/-- HR: --  Site: --  Mode: --  Orthostatic VS  10-28-23 @ 08:27  Lying BP: --/-- HR: --  Sitting BP: 106/60 HR: 75  Standing BP: --/-- HR: --  Site: --  Mode: --  
Vital Signs Last 24 Hrs  T(C): 36.7 (10-11-23 @ 06:43), Max: 36.7 (10-11-23 @ 06:43)  T(F): 98.1 (10-11-23 @ 06:43), Max: 98.1 (10-11-23 @ 06:43)  HR: --  BP: --  BP(mean): --  RR: 17 (10-11-23 @ 06:43) (17 - 17)  SpO2: --    Orthostatic VS  10-11-23 @ 06:43  Lying BP: --/-- HR: --  Sitting BP: 105/70 HR: 70  Standing BP: --/-- HR: --  Site: --  Mode: --  Orthostatic VS  10-10-23 @ 06:39  Lying BP: --/-- HR: --  Sitting BP: 104/67 HR: 69  Standing BP: 107/70 HR: 67  Site: --  Mode: --  
Vital Signs Last 24 Hrs  T(C): 36.4 (10-20-23 @ 06:46), Max: 36.5 (10-19-23 @ 20:42)  T(F): 97.6 (10-20-23 @ 06:46), Max: 97.7 (10-19-23 @ 20:42)  HR: --  BP: --  BP(mean): --  RR: 17 (10-20-23 @ 06:46) (17 - 17)  SpO2: --    Orthostatic VS  10-20-23 @ 06:46  Lying BP: --/-- HR: --  Sitting BP: 103/60 HR: 75  Standing BP: --/-- HR: --  Site: --  Mode: --  Orthostatic VS  10-19-23 @ 06:38  Lying BP: --/-- HR: --  Sitting BP: 103/56 HR: 62  Standing BP: --/-- HR: --  Site: --  Mode: --  
Vital Signs Last 24 Hrs  T(C): --  T(F): --  HR: --  BP: --  BP(mean): --  RR: --  SpO2: --    
Vital Signs Last 24 Hrs  T(C): 36.6 (10-16-23 @ 06:43), Max: 36.6 (10-16-23 @ 06:43)  T(F): 97.8 (10-16-23 @ 06:43), Max: 97.8 (10-16-23 @ 06:43)  HR: --  BP: --  BP(mean): --  RR: 19 (10-16-23 @ 06:43) (19 - 19)  SpO2: --    Orthostatic VS  10-16-23 @ 06:43  Lying BP: --/-- HR: --  Sitting BP: 97/64 HR: 65  Standing BP: --/-- HR: --  Site: --  Mode: --  
Vital Signs Last 24 Hrs  T(C): 36.4 (10-18-23 @ 06:38), Max: 36.4 (10-18-23 @ 06:38)  T(F): 97.5 (10-18-23 @ 06:38), Max: 97.5 (10-18-23 @ 06:38)  HR: 68 (10-18-23 @ 06:38) (68 - 68)  BP: 97/60 (10-18-23 @ 06:38) (97/60 - 97/60)  BP(mean): --  RR: 19 (10-18-23 @ 06:38) (19 - 19)  SpO2: --    
Vital Signs Last 24 Hrs  T(C): 36.3 (10-07-23 @ 06:43), Max: 36.8 (10-06-23 @ 20:19)  T(F): 97.4 (10-07-23 @ 06:43), Max: 98.2 (10-06-23 @ 20:19)  HR: --  BP: --  BP(mean): --  RR: 16 (10-07-23 @ 06:43) (16 - 16)  SpO2: --    Orthostatic VS  10-07-23 @ 06:43  Lying BP: --/-- HR: --  Sitting BP: 107/66 HR: 69  Standing BP: --/-- HR: --  Site: --  Mode: --  Orthostatic VS  10-05-23 @ 23:14  Lying BP: --/-- HR: --  Sitting BP: 101/67 HR: 79  Standing BP: 111/67 HR: 99  Site: --  Mode: --  
Vital Signs Last 24 Hrs  T(C): 36.3 (10-17-23 @ 06:38), Max: 36.3 (10-17-23 @ 06:38)  T(F): 97.4 (10-17-23 @ 06:38), Max: 97.4 (10-17-23 @ 06:38)  HR: 78 (10-17-23 @ 06:38) (78 - 78)  BP: 111/72 (10-17-23 @ 06:38) (111/72 - 111/72)  BP(mean): --  RR: --  SpO2: --    Orthostatic VS  10-16-23 @ 06:43  Lying BP: --/-- HR: --  Sitting BP: 97/64 HR: 65  Standing BP: --/-- HR: --  Site: --  Mode: --  
Vital Signs Last 24 Hrs  T(C): 36.7 (10-09-23 @ 07:49), Max: 36.7 (10-09-23 @ 07:49)  T(F): 98 (10-09-23 @ 07:49), Max: 98 (10-09-23 @ 07:49)  HR: 64 (10-09-23 @ 07:49) (64 - 64)  BP: 103/71 (10-09-23 @ 07:49) (103/71 - 103/71)  BP(mean): --  RR: 18 (10-09-23 @ 07:49) (18 - 18)  SpO2: --    
Vital Signs Last 24 Hrs  T(C): 36.2 (10-23-23 @ 20:43), Max: 36.2 (10-23-23 @ 20:43)  T(F): 97.1 (10-23-23 @ 20:43), Max: 97.1 (10-23-23 @ 20:43)  HR: --  BP: --  BP(mean): --  RR: --  SpO2: --    
Vital Signs Last 24 Hrs  T(C): 36.6 (10-21-23 @ 08:04), Max: 36.7 (10-20-23 @ 20:24)  T(F): 97.8 (10-21-23 @ 08:04), Max: 98 (10-20-23 @ 20:24)  HR: 76 (10-21-23 @ 08:04) (76 - 76)  BP: 100/61 (10-21-23 @ 08:04) (100/61 - 100/61)  BP(mean): --  RR: 16 (10-21-23 @ 08:04) (16 - 16)  SpO2: --    Orthostatic VS  10-20-23 @ 06:46  Lying BP: --/-- HR: --  Sitting BP: 103/60 HR: 75  Standing BP: --/-- HR: --  Site: --  Mode: --  
Vital Signs Last 24 Hrs  T(C): 36.7 (10-23-23 @ 06:47), Max: 36.7 (10-23-23 @ 06:47)  T(F): 98.1 (10-23-23 @ 06:47), Max: 98.1 (10-23-23 @ 06:47)  HR: 88 (10-23-23 @ 06:47) (88 - 100)  BP: 107/68 (10-23-23 @ 06:47) (107/68 - 116/67)  BP(mean): --  RR: 19 (10-23-23 @ 06:47) (19 - 19)  SpO2: --    
Vital Signs Last 24 Hrs  T(C): 36.6 (10-19-23 @ 06:38), Max: 36.6 (10-19-23 @ 06:38)  T(F): 97.8 (10-19-23 @ 06:38), Max: 97.8 (10-19-23 @ 06:38)  HR: --  BP: --  BP(mean): --  RR: 17 (10-19-23 @ 06:38) (17 - 17)  SpO2: --    Orthostatic VS  10-19-23 @ 06:38  Lying BP: --/-- HR: --  Sitting BP: 103/56 HR: 62  Standing BP: --/-- HR: --  Site: --  Mode: --  
Vital Signs Last 24 Hrs  T(C): 36.6 (10-30-23 @ 07:57), Max: 36.6 (10-30-23 @ 07:57)  T(F): 97.8 (10-30-23 @ 07:57), Max: 97.8 (10-30-23 @ 07:57)  HR: 20 (10-30-23 @ 07:57) (20 - 20)  BP: 115/75 (10-30-23 @ 07:57) (115/75 - 115/75)  BP(mean): --  RR: 20 (10-30-23 @ 07:57) (20 - 20)  SpO2: --    Orthostatic VS  10-29-23 @ 08:13  Lying BP: --/-- HR: --  Sitting BP: 119/75 HR: 93  Standing BP: --/-- HR: --  Site: --  Mode: --  
Vital Signs Last 24 Hrs  T(C): 36.7 (10-26-23 @ 06:18), Max: 36.7 (10-26-23 @ 06:18)  T(F): 98.1 (10-26-23 @ 06:18), Max: 98.1 (10-26-23 @ 06:18)  HR: --  BP: --  BP(mean): --  RR: 17 (10-26-23 @ 06:18) (17 - 17)  SpO2: --    Orthostatic VS  10-26-23 @ 06:18  Lying BP: --/-- HR: --  Sitting BP: 104/78 HR: 70  Standing BP: --/-- HR: --  Site: --  Mode: --  Orthostatic VS  10-25-23 @ 06:16  Lying BP: --/-- HR: --  Sitting BP: 92/62 HR: 88  Standing BP: --/-- HR: --  Site: --  Mode: --

## 2023-11-02 NOTE — BH INPATIENT PSYCHIATRY PROGRESS NOTE - NSTXCOPEPROGRES_PSY_ALL_CORE
Improving
Improving
Met - goal discontinued
Improving

## 2023-11-02 NOTE — BH PSYCHOLOGY - GROUP THERAPY NOTE - NSPSYCHOLGRPCOGGOAL_PSY_A_CORE
learn cognitive skills to improve coping with stress
learn cognitive skills to improve coping with stress

## 2023-11-02 NOTE — BH INPATIENT PSYCHIATRY PROGRESS NOTE - NSTXPSYCHOGOAL_PSY_ALL_CORE
Will identify 2 coping skills that assist with focus on reality
Will identify 2 coping skills that help mitigate hallucinations
Will identify 2 coping skills that assist with focus on reality

## 2023-11-02 NOTE — BH INPATIENT PSYCHIATRY PROGRESS NOTE - NSTXPSYCHODATETRGT_PSY_ALL_CORE
13-Oct-2023
09-Nov-2023
13-Oct-2023

## 2023-11-02 NOTE — BH PSYCHOLOGY - GROUP THERAPY NOTE - NSPSYCHOLGRPCOGINT_PSY_A_CORE FT
[employer] is an ACT-based group in which patients learn skills and explore themes of identity, compassion, resilience, and connection through the lens of marginalized identity. Group begins with setting group expectations and introductions that focus on identity exploration. Following introductions, the daily theme is presented through both didactics and experiential activities. Group today focused on the theme “resilience.” Patients were provided psychoeducation about resilience and engaged in discussion about the importance of developing resilience to cope with stressful times.  led patients in an experiential exercise that focused on positive inner self-talk and growing resilience. 
[employer] is an ACT-based group in which patients learn skills and explore themes of identity, compassion, resilience, and connection through the lens of marginalized identity. Group begins with setting group expectations and introductions that focus on identity exploration. Following introductions, the daily theme is presented through both didactics and experiential activities. Group today focused on the theme “identity.” Patients were provided psychoeducation about identity and engaged in discussion about types of identity and the importance of values on identity formation.  provided experiential activities that allowed patients to engage in their own values exploration, determining the unique values that help to form their identities.

## 2023-11-02 NOTE — BH INPATIENT PSYCHIATRY PROGRESS NOTE - NSTXCOPEDATETRGT_PSY_ALL_CORE
20-Oct-2023
13-Oct-2023
03-Nov-2023
20-Oct-2023
13-Oct-2023
03-Nov-2023
20-Oct-2023
20-Oct-2023
03-Nov-2023
20-Oct-2023
13-Oct-2023
20-Oct-2023
13-Oct-2023
13-Oct-2023
09-Nov-2023
03-Nov-2023
13-Oct-2023
20-Oct-2023
13-Oct-2023
03-Nov-2023
20-Oct-2023

## 2023-11-02 NOTE — BH INPATIENT PSYCHIATRY PROGRESS NOTE - NSTXIMPULSDATETRGT_PSY_ALL_CORE
13-Oct-2023
09-Nov-2023
13-Oct-2023

## 2023-11-02 NOTE — BH INPATIENT PSYCHIATRY PROGRESS NOTE - NSTXPROBDCOPLK_PSY_ALL_CORE
DISCHARGE ISSUE - LACK OF APPROPRIATE OUTPATIENT SERVICES

## 2023-11-02 NOTE — BH INPATIENT PSYCHIATRY PROGRESS NOTE - NSICDXBHPRIMARYDX_PSY_ALL_CORE
Psychosis   F29  

## 2023-11-02 NOTE — BH INPATIENT PSYCHIATRY PROGRESS NOTE - NSTXPROBDISORG_PSY_ALL_CORE
DISORGANIZATION OF THOUGHT/BEHAVIOR

## 2023-11-03 VITALS
HEART RATE: 73 BPM | TEMPERATURE: 97 F | DIASTOLIC BLOOD PRESSURE: 60 MMHG | RESPIRATION RATE: 17 BRPM | SYSTOLIC BLOOD PRESSURE: 95 MMHG

## 2023-11-03 PROCEDURE — 99238 HOSP IP/OBS DSCHRG MGMT 30/<: CPT

## 2023-11-03 RX ADMIN — Medication 500 MILLIGRAM(S): at 08:57

## 2023-11-03 NOTE — BH INPATIENT PSYCHIATRY DISCHARGE NOTE - OTHER PAST PSYCHIATRIC HISTORY (INCLUDE DETAILS REGARDING ONSET, COURSE OF ILLNESS, INPATIENT/OUTPATIENT TREATMENT)
Recent hx of depression, started treatment approximately 1 month prior to admission with Dr. Mueller, who prescribed Prozac 20 mg.   No history of psychiatric admissions.  No history of suicide attempts or NSSIB.

## 2023-11-03 NOTE — BH INPATIENT PSYCHIATRY DISCHARGE NOTE - HOSPITAL COURSE
Pt was admitted to [facility] with reported paranoia and delusions of reference over past year, worsening in the two weeks prior to admission with auditory hallucinations, after pt smoked an unknown substance.  Etiology of presentation was likely multifactorial, with concern for first break primary psychosis and also substance induced component.  Pt was started on aripiprazole, titrated to 20 mg daily, with poor efficacy.  Pt was switched to risperidone, titrated to 4 mg QHS, with good effect, attenuation of symptoms and significant improvement in reality testing.  Throughout admission, pt was visible on unit, social with peers, attended groups.   Pt was compliant with medications and basic care, and maintained ADLs independently.  Pt consistently denied suicidality and homicidality, and was future oriented.  Pt was amenable to outpatient follow up with PHP.  Given that pt was no longer an acute or imminent risk of harm to self or others, pt was discharged home.

## 2023-11-03 NOTE — BH INPATIENT PSYCHIATRY DISCHARGE NOTE - NSBHFUPINTERVALHXFT_PSY_A_CORE
No overnight events.  Pt today states that she is sleeping and eating adequately.  She denies any auditory hallucinations.  Pt also denies any thoughts of harming self or others.  Pt plans on returning home, spending time with family, obtaining part time job, and will eventually return to school next semester.

## 2023-11-03 NOTE — BH INPATIENT PSYCHIATRY DISCHARGE NOTE - NSBHMETABOLIC_PSY_ALL_CORE_FT
BMI: BMI (kg/m2): 21.3 (10-05-23 @ 23:14)  HbA1c: A1C with Estimated Average Glucose Result: 5.0 % (10-10-23 @ 08:35)    Glucose:   BP: 95/60 (11-03-23 @ 06:39) (95/60 - 110/67)  Lipid Panel: Date/Time: 10-10-23 @ 08:35  Cholesterol, Serum: 176  Direct LDL: --  HDL Cholesterol, Serum: 65  Total Cholesterol/HDL Ration Measurement: --  Triglycerides, Serum: 56
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
## 2023-11-03 NOTE — BH INPATIENT PSYCHIATRY DISCHARGE NOTE - NSBHDCHANDOFF_PSY_ALL_CORE
Patient was approved for the Rinvoq, but was denied the patient assistance for it.  When the PA team notified him of this, he became very upset and is now calling the office here to see what he should do next.  He has the option to call [employer] to make an appeal.  Patient also only has 1 pill left. Please advise.          Rinvoq 15 mg - Pending [employer] Appeal    Rx Insurance: [employer]     Patient call back and I let him know that [employer] denied him and I could give him the phone number to appeal if he would like. Patient got very upset with how long this was taking and only has one pill left. Patient said he will be calling the provider's office on what to do next.      Chaparrita Bell  [birth year]   Yes...

## 2023-11-03 NOTE — BH INPATIENT PSYCHIATRY DISCHARGE NOTE - NSDCMRMEDTOKEN_GEN_ALL_CORE_FT
metroNIDAZOLE 500 mg oral tablet: 1 tab(s) orally 2 times a day Take six more doses and STOP.  risperiDONE 4 mg oral tablet, disintegratin tab(s) orally once a day (at bedtime)

## 2023-11-03 NOTE — BH INPATIENT PSYCHIATRY DISCHARGE NOTE - NSDCCPCAREPLAN_GEN_ALL_CORE_FT
PRINCIPAL DISCHARGE DIAGNOSIS  Diagnosis: Psychosis  Assessment and Plan of Treatment: Medication management and psychotherapy
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
## 2023-11-03 NOTE — BH INPATIENT PSYCHIATRY DISCHARGE NOTE - MSE UNSTRUCTURED FT
Appearance: Dressed appropriately.  Good hygiene and grooming.   Behavior: Cooperative.  Calm.  Good eye contact.  Well related.   Motor: No abnormal movements, no psychomotor slowing or activation.  Speech: Regular rate.  Mood: "Good."  Affect: Pleasant, full range, stable.   Thought Process: Linear.  Associations: Fair.  Thought Content:  Future oriented.  Denies AVH.  Denies SIIP or HIIP.  Insight: Improved.  Judgment: Fair on interview.  Attention: Fair.  Language: Fluent.  Gait: Intact.

## 2023-11-03 NOTE — BH INPATIENT PSYCHIATRY DISCHARGE NOTE - NSBHASSESSSUMMFT_PSY_ALL_CORE
Attenuated psychosis, pt denies any SIIP or HIIP, pt is future oriented.    Risk assessment on discharge: Pt has chronic risk factors of hx cannabis use, trauma hx, reported family hx of SMI, with acute risk factors of recent depression and psychosis, now treated with inpatient care consisting of medication management and psychotherapeutic interventions.  Protective factors of therapeutic alliances, supportive family, stable housing, future oriented, is a student.  Pt has consistently denied suicidality and homicidality, is emotionally regulated with good behavioral control, and is caring for ADLs independently.  Pt is CHRONIC risk of harm, but is NO longer an acute or imminent risk of harm to self or others, pt can be discharged home with outpatient follow up.     1. Will d/c home on current regimen.  2. Psychoeducation provided regarding importance of compliance with outpatient appointments and medications.  3. Pt was advised to remain abstinent with substances.  4. Pt was advised to dial 911 or return to ER if they become danger to self or others.

## 2023-11-03 NOTE — BH INPATIENT PSYCHIATRY DISCHARGE NOTE - NSBHDCHANDOFFFT_PSY_ALL_CORE
Clinical handoff including hospital course, diagnosis, and treatment was sent to: Dr. Michelle [phone].

## 2023-11-06 ENCOUNTER — OUTPATIENT (OUTPATIENT)
Dept: OUTPATIENT SERVICES | Facility: HOSPITAL | Age: 23
LOS: 1 days | Discharge: HOME | End: 2023-11-06
Payer: COMMERCIAL

## 2023-11-06 ENCOUNTER — TRANSCRIPTION ENCOUNTER (OUTPATIENT)
Age: 23
End: 2023-11-06

## 2023-11-06 PROCEDURE — 90792 PSYCH DIAG EVAL W/MED SRVCS: CPT

## 2023-11-15 PROCEDURE — 99213 OFFICE O/P EST LOW 20 MIN: CPT

## 2023-11-20 PROCEDURE — 99213 OFFICE O/P EST LOW 20 MIN: CPT

## 2023-11-29 PROCEDURE — 99213 OFFICE O/P EST LOW 20 MIN: CPT

## 2023-12-06 DIAGNOSIS — F31.9 BIPOLAR DISORDER, UNSPECIFIED: ICD-10-CM

## 2023-12-06 DIAGNOSIS — F19.959 OTHER PSYCHOACTIVE SUBSTANCE USE, UNSPECIFIED WITH PSYCHOACTIVE SUBSTANCE-INDUCED PSYCHOTIC DISORDER, UNSPECIFIED: ICD-10-CM

## 2023-12-12 PROCEDURE — 99213 OFFICE O/P EST LOW 20 MIN: CPT

## 2024-01-01 NOTE — ED BEHAVIORAL HEALTH ASSESSMENT NOTE - NSBHMSEREMMEM_PSY_A_CORE
healed.           Use bulb syringe to suction mucous from mouth and nose if needed.           Place baby on the back for sleep.           ODH and Hepatitis B information given.(CDC vaccine information statement 2-2-2012).          ODH Brochure \"A Sound Beginning\" was given to the parent/guardian/.   Circumcision Care: Keep circumcision clean and dry. A Vaseline product may be applied to penis if there is oozing.    Test results regarding [place] Hearing Screening received per Audiology Services.  Hepatitis B Vaccine given.       FORMULA FEEDING:   Feed Similac Sensitive every 3-4 hours           Special Instructions:  Wake baby every 4 hours thru the nite to feed if he does not wake up on his own     FOLLOW-UP CARE   Pediatrician/Family Physician: Follow up with Dr. Landeros in 3  days     UPON DISCHARGE: Have the following signed and witnessed.  I CERTIFY that during the discharge procedure I received my baby, examined him/her and determined that he/she was mine. I checked the identiband parts sealed on the baby and on me and found that they were identically numbered   [id number]          and contained correct identifying information.    Home Going Discharge Instructions  Sponge bath until navel and circumcision are completely healed  Cord care: keep cord area dry until cord falls off and is completely healed  If circumcision: keep circumcision clean and dry. Vaseline product may be applied if there is oozing  Use bulb syringe to suction mucous from mouth and nose if needed  Place baby on back for sleep in own bed  Breast feed or formula  every 2 1/2 to 4 hours  Baby to travel in an infant car seat, rear facing.        Follow Up Information:  Contact your pediatrician or family practitioner and schedule an appointment within  1-2  days.    Call your doctor for:  *Temperature greater than 100.4 F or 38 C Axillary  *Change in baby’s breathing  *Change in baby’s regular feeding routine  *Change in baby’s  regular urine or stool output  *Increasing jaundice  *Any new problems    Infection Control:  Limit your baby's exposure to crowds, public places, and to anyone who is sick.  Frequent hand washing is a must to prevent infection.  All adult caregivers should receive the Tdap (whooping cough) vaccine and the flu shot.  All childhood contacts should be kept up to date on their immunizations and receive yearly flu shots.  Once eligible (at least 6 months of age), your new baby should receive a yearly flu shot.  Your baby should have received the Hepatitis B vaccine prior to discharge.  The next set of immunizations will be due at 2 months of age.      Follow Child Safety Seat Guidelines:  It is [place] State law that every child under 8 years old must ride in a child safety seat unless the child is 4'9\" or taller. Infants and young children should be placed in a rear facing car seat until they are at least 2 years of age and have outgrown their rear facing car seat.  Every child from 8-15 years old who is not secured in a child safety seat must be secured in the vehicle's seat belt.     Follow safe-sleep guidelines:   Place your baby on his/her back to sleep every time. Use a firm sleep surface. Cover mattress with one snug fitted sheet. Nothing is to be in the crib except your baby. Sleeping in a parent’s room is recommended but baby should be alone in his/her own bed. Avoid overheating. When awake, supervised Tummy Time is recommended.  Smoke exposure, even if second hand, is known to have many ill side effects for babies and puts them at higher risk for SIDS.  Pacifiers (not attached to a string) may be used.     Prevention of Premature birth:  1.  Preventing premature birth: \"Go when you know\" for early prenatal care and ask about Progesterone. Progesterone supplementation has been shown to decrease the risk of premature birth for high risk mothers by more than 40%. If your baby was born because you had premature labor  Normal

## 2024-02-23 ENCOUNTER — OUTPATIENT (OUTPATIENT)
Dept: OUTPATIENT SERVICES | Facility: HOSPITAL | Age: 24
LOS: 1 days | Discharge: STILL ADMITTED OR ADMINISTRATIVE | End: 2024-02-23
Payer: COMMERCIAL

## 2024-02-23 PROBLEM — A59.9 TRICHOMONIASIS, UNSPECIFIED: Chronic | Status: ACTIVE | Noted: 2023-11-03

## 2024-02-23 PROCEDURE — 99214 OFFICE O/P EST MOD 30 MIN: CPT

## 2024-03-14 DIAGNOSIS — F19.959 OTHER PSYCHOACTIVE SUBSTANCE USE, UNSPECIFIED WITH PSYCHOACTIVE SUBSTANCE-INDUCED PSYCHOTIC DISORDER, UNSPECIFIED: ICD-10-CM

## 2024-03-21 DIAGNOSIS — F31.9 BIPOLAR DISORDER, UNSPECIFIED: ICD-10-CM

## 2024-03-27 NOTE — BH INPATIENT PSYCHIATRY PROGRESS NOTE - NSBHMSEREMMEM_PSY_A_CORE
----- Message from Maurisio Torres MD sent at 3/27/2024  1:13 PM EDT -----  Regarding: RE: P2P NEEDED BY TOMORROW 03/28/24  Approval number: [id number]  ----- Message -----  From: Radha Glenys  Sent: 3/27/2024  10:23 AM EDT  To: Maurisio Torres MD; #  Subject: P2P NEEDED BY TOMORROW 03/28/24                  Good morning,   The preauthorization for this patients STAT CT ABD/PELVIS CPT 19421 completed on 03/20/24 is currently denied and in need of a P2P.     An IB message was sent to your clinical team on 03/22/24 regarding this P2P    If you could please have any licensed clinician call [facility] at [phone] to complete P2P by tomorrow.  TRACKING # [id number]  STAT CT ABD/PELVIS ordered by MD Maurisio Torres    Please respond with whether the P2P will be done or not.    Any questions please reach out via IB message, teams message or phone call [phone].    Thank you!      
Normal

## 2024-12-18 NOTE — ED PROVIDER NOTE - CROS ED CONS ALL NEG
Take Fiasp right before your meals   Start Jardiance 10 mg once daily - take in the morning   Increase Glargine 62 unit nightly   Increase Fiasp 15 units before meals   Get exercise by using stationary bike   Drink lots of water   Try Scrambled eggs with toast for breakfast instead of Burrito  
negative - no fever

## 2025-01-21 NOTE — BH PATIENT CHARACTERISTICS SURVEY - NSPCSCHROMEDCON10_PSY_ALL_CORE
Patient returning call from EVONNE Wynne.  [facility] # [phone]  
Writer spoke to patient,  [facility] women medicaid forms completed faxed back to [phone].  
No

## 2025-03-12 ENCOUNTER — INPATIENT (INPATIENT)
Facility: HOSPITAL | Age: 25
LOS: 29 days | Discharge: HOME | End: 2025-04-11
Attending: PSYCHIATRY & NEUROLOGY | Admitting: STUDENT IN AN ORGANIZED HEALTH CARE EDUCATION/TRAINING PROGRAM
Payer: COMMERCIAL

## 2025-03-12 VITALS
HEART RATE: 112 BPM | SYSTOLIC BLOOD PRESSURE: 115 MMHG | TEMPERATURE: 98 F | WEIGHT: 154.98 LBS | DIASTOLIC BLOOD PRESSURE: 77 MMHG | OXYGEN SATURATION: 97 % | RESPIRATION RATE: 17 BRPM

## 2025-03-12 DIAGNOSIS — R45.851 SUICIDAL IDEATIONS: ICD-10-CM

## 2025-03-12 LAB
ALBUMIN SERPL ELPH-MCNC: 4.5 G/DL — SIGNIFICANT CHANGE UP (ref 3.3–5)
ALP SERPL-CCNC: 88 U/L — SIGNIFICANT CHANGE UP (ref 40–120)
ALT FLD-CCNC: 27 U/L — SIGNIFICANT CHANGE UP (ref 4–33)
ANION GAP SERPL CALC-SCNC: 16 MMOL/L — HIGH (ref 7–14)
APAP SERPL-MCNC: <10 UG/ML — LOW (ref 15–25)
APPEARANCE UR: ABNORMAL
AST SERPL-CCNC: 22 U/L — SIGNIFICANT CHANGE UP (ref 4–32)
BACTERIA # UR AUTO: ABNORMAL /HPF
BASOPHILS # BLD AUTO: 0.05 K/UL — SIGNIFICANT CHANGE UP (ref 0–0.2)
BASOPHILS NFR BLD AUTO: 0.8 % — SIGNIFICANT CHANGE UP (ref 0–2)
BILIRUB SERPL-MCNC: 0.5 MG/DL — SIGNIFICANT CHANGE UP (ref 0.2–1.2)
BILIRUB UR-MCNC: NEGATIVE — SIGNIFICANT CHANGE UP
BUN SERPL-MCNC: 11 MG/DL — SIGNIFICANT CHANGE UP (ref 7–23)
CALCIUM SERPL-MCNC: 9.3 MG/DL — SIGNIFICANT CHANGE UP (ref 8.4–10.5)
CHLORIDE SERPL-SCNC: 101 MMOL/L — SIGNIFICANT CHANGE UP (ref 98–107)
CO2 SERPL-SCNC: 21 MMOL/L — LOW (ref 22–31)
COLOR SPEC: YELLOW — SIGNIFICANT CHANGE UP
CREAT SERPL-MCNC: 1.02 MG/DL — SIGNIFICANT CHANGE UP (ref 0.5–1.3)
DIFF PNL FLD: NEGATIVE — SIGNIFICANT CHANGE UP
EGFR: 80 ML/MIN/1.73M2 — SIGNIFICANT CHANGE UP
EGFR: 80 ML/MIN/1.73M2 — SIGNIFICANT CHANGE UP
EOSINOPHIL # BLD AUTO: 0.31 K/UL — SIGNIFICANT CHANGE UP (ref 0–0.5)
EOSINOPHIL NFR BLD AUTO: 4.9 % — SIGNIFICANT CHANGE UP (ref 0–6)
ETHANOL SERPL-MCNC: <10 MG/DL — SIGNIFICANT CHANGE UP
FLUAV AG NPH QL: SIGNIFICANT CHANGE UP
FLUBV AG NPH QL: SIGNIFICANT CHANGE UP
GLUCOSE SERPL-MCNC: 94 MG/DL — SIGNIFICANT CHANGE UP (ref 70–99)
GLUCOSE UR QL: NEGATIVE MG/DL — SIGNIFICANT CHANGE UP
HCG SERPL-ACNC: <1 MIU/ML — SIGNIFICANT CHANGE UP
HCT VFR BLD CALC: 44.5 % — SIGNIFICANT CHANGE UP (ref 34.5–45)
HGB BLD-MCNC: 13.9 G/DL — SIGNIFICANT CHANGE UP (ref 11.5–15.5)
IANC: 3.87 K/UL — SIGNIFICANT CHANGE UP (ref 1.8–7.4)
IMM GRANULOCYTES NFR BLD AUTO: 0.3 % — SIGNIFICANT CHANGE UP (ref 0–0.9)
KETONES UR-MCNC: 15 MG/DL
LEUKOCYTE ESTERASE UR-ACNC: ABNORMAL
LYMPHOCYTES # BLD AUTO: 1.32 K/UL — SIGNIFICANT CHANGE UP (ref 1–3.3)
LYMPHOCYTES # BLD AUTO: 21.1 % — SIGNIFICANT CHANGE UP (ref 13–44)
MCHC RBC-ENTMCNC: 25.5 PG — LOW (ref 27–34)
MCHC RBC-ENTMCNC: 31.2 G/DL — LOW (ref 32–36)
MCV RBC AUTO: 81.7 FL — SIGNIFICANT CHANGE UP (ref 80–100)
MONOCYTES # BLD AUTO: 0.7 K/UL — SIGNIFICANT CHANGE UP (ref 0–0.9)
MONOCYTES NFR BLD AUTO: 11.2 % — SIGNIFICANT CHANGE UP (ref 2–14)
NEUTROPHILS # BLD AUTO: 3.87 K/UL — SIGNIFICANT CHANGE UP (ref 1.8–7.4)
NEUTROPHILS NFR BLD AUTO: 61.7 % — SIGNIFICANT CHANGE UP (ref 43–77)
NITRITE UR-MCNC: NEGATIVE — SIGNIFICANT CHANGE UP
NRBC # BLD AUTO: 0 K/UL — SIGNIFICANT CHANGE UP (ref 0–0)
NRBC # FLD: 0 K/UL — SIGNIFICANT CHANGE UP (ref 0–0)
NRBC BLD AUTO-RTO: 0 /100 WBCS — SIGNIFICANT CHANGE UP (ref 0–0)
PH UR: 6 — SIGNIFICANT CHANGE UP (ref 5–8)
PLATELET # BLD AUTO: 242 K/UL — SIGNIFICANT CHANGE UP (ref 150–400)
POTASSIUM SERPL-MCNC: 4.2 MMOL/L — SIGNIFICANT CHANGE UP (ref 3.5–5.3)
POTASSIUM SERPL-SCNC: 4.2 MMOL/L — SIGNIFICANT CHANGE UP (ref 3.5–5.3)
PROT SERPL-MCNC: 7.7 G/DL — SIGNIFICANT CHANGE UP (ref 6–8.3)
PROT UR-MCNC: 30 MG/DL
RBC # BLD: 5.45 M/UL — HIGH (ref 3.8–5.2)
RBC # FLD: 15 % — HIGH (ref 10.3–14.5)
RBC CASTS # UR COMP ASSIST: 0 /HPF — SIGNIFICANT CHANGE UP (ref 0–4)
REVIEW: SIGNIFICANT CHANGE UP
RSV RNA NPH QL NAA+NON-PROBE: SIGNIFICANT CHANGE UP
SALICYLATES SERPL-MCNC: <0.3 MG/DL — LOW (ref 15–30)
SARS-COV-2 RNA SPEC QL NAA+PROBE: SIGNIFICANT CHANGE UP
SODIUM SERPL-SCNC: 138 MMOL/L — SIGNIFICANT CHANGE UP (ref 135–145)
SP GR SPEC: 1.03 — HIGH (ref 1–1.03)
SQUAMOUS # UR AUTO: 8 /HPF — HIGH (ref 0–5)
TOXICOLOGY SCREEN, DRUGS OF ABUSE, SERUM RESULT: SIGNIFICANT CHANGE UP
TSH SERPL-MCNC: 1.73 UIU/ML — SIGNIFICANT CHANGE UP (ref 0.27–4.2)
UROBILINOGEN FLD QL: 1 MG/DL — SIGNIFICANT CHANGE UP (ref 0.2–1)
WBC # BLD: 6.27 K/UL — SIGNIFICANT CHANGE UP (ref 3.8–10.5)
WBC # FLD AUTO: 6.27 K/UL — SIGNIFICANT CHANGE UP (ref 3.8–10.5)
WBC UR QL: 6 /HPF — SIGNIFICANT CHANGE UP (ref 0–5)

## 2025-03-12 PROCEDURE — 99285 EMERGENCY DEPT VISIT HI MDM: CPT

## 2025-03-12 RX ORDER — HALOPERIDOL 10 MG/1
5 TABLET ORAL EVERY 8 HOURS
Refills: 0 | Status: DISCONTINUED | OUTPATIENT
Start: 2025-03-12 | End: 2025-04-11

## 2025-03-12 RX ORDER — HALOPERIDOL 10 MG/1
5 TABLET ORAL ONCE
Refills: 0 | Status: DISCONTINUED | OUTPATIENT
Start: 2025-03-12 | End: 2025-04-11

## 2025-03-12 RX ORDER — RISPERIDONE 4 MG
0.5 TABLET ORAL
Refills: 0 | Status: DISCONTINUED | OUTPATIENT
Start: 2025-03-12 | End: 2025-03-17

## 2025-03-12 RX ORDER — LORAZEPAM 4 MG/ML
2 VIAL (ML) INJECTION EVERY 8 HOURS
Refills: 0 | Status: DISCONTINUED | OUTPATIENT
Start: 2025-03-12 | End: 2025-03-19

## 2025-03-12 RX ORDER — MELATONIN 5 MG
3 TABLET ORAL AT BEDTIME
Refills: 0 | Status: DISCONTINUED | OUTPATIENT
Start: 2025-03-12 | End: 2025-04-11

## 2025-03-12 RX ORDER — ACETAMINOPHEN 500 MG/5ML
650 LIQUID (ML) ORAL EVERY 6 HOURS
Refills: 0 | Status: DISCONTINUED | OUTPATIENT
Start: 2025-03-12 | End: 2025-04-11

## 2025-03-12 RX ORDER — NICOTINE POLACRILEX 4 MG/1
4 GUM, CHEWING ORAL
Refills: 0 | Status: DISCONTINUED | OUTPATIENT
Start: 2025-03-12 | End: 2025-04-11

## 2025-03-12 RX ADMIN — NICOTINE POLACRILEX 4 MILLIGRAM(S): 4 GUM, CHEWING ORAL at 20:15

## 2025-03-12 RX ADMIN — Medication 0.5 MILLIGRAM(S): at 20:15

## 2025-03-12 NOTE — ED BEHAVIORAL HEALTH ASSESSMENT NOTE - DESCRIPTION
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

## 2025-03-12 NOTE — ED BEHAVIORAL HEALTH NOTE - BEHAVIORAL HEALTH NOTE
SW met with pt mother Ani ([phone]) in ED waiting room to collect collateral information. SW asked pt mother what brought pt to ED. Pt mother reported, "She came home early from work yesterday at around 3:00pm and told me she was not feeling good". SW encouraged pt mother to elaborate on what she means by "not feeling good". Pt mother with minimal insight stated, "She said she does not feel good. I know it is because she has not taken her medication since December". SW asked pt mother what medication pt is taking. Pt mother does not know what medication the pt is taking. Pt mother reported, "I know it is psychiatric medication but I don't know what it is called". SW asked pt mother about pt psychiatric history. Pt mother reported "She was admitted to the hospital last year. My sister and I picked her up from college and brought her to the hospital because she said she was not feeling good. She always tells us when she is not feeling good and we usually know that it is mental". SW encourage pt mother to elaborate. Pt mother continued to have minimal insight into pt's mental health history. SW asked pt mother if pt exhibits SI/HI. Pt mother denied pt exhibiting SI or HI. SW asked pt mother about report of pt hearing voices telling pt to "kill herself". Pt mother denied pt history of SI and self-injurious behaviors. SW asked pt mother to discuss pt hearing voices. Pt mother reported, "She asks me if I call her name but I don't. She thinks she hears my voice when I am not talking to her". SW asked pt mother if there are safety concerns at home. Pt mother denied safety concerns for pt. Pt mother reported "I brought her here because she could not get an appointment with her doctor to get medication. She needs to get a refill of her medication that she takes at night". Pt mother still unsure what medication pt is taking. SW asked pt mother if pt is employed. 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 Pt is not  and does not have children. Pt lives at home with mother. SW asked pt mother if pt uses substances. Pt mother reported "She used to smoke but she quit. She tells me that she only has one drink when she drinks". SW asked pt mother if pt currently sees a psychiatrist or therapist. Pt mother reported, "She used to see a therapist while she was in college. She has not seen [place]. She has not seen a therapist since December". SW to follow up with updates.

## 2025-03-12 NOTE — ED PROVIDER NOTE - ATTENDING APP SHARED VISIT CONTRIBUTION OF CARE
24-year-old female with past medical history of psychosis on risperidone and Abilify–noncompliant coming in with auditory hallucinations telling her to hurt herself.  Reports she attempted to end her life by taking Benadryl on Sunday night which was 3 days ago.  Also took another 3 pills of Benadryl 2 days ago.  Denies chest pain, difficulty breathing, dumping, nausea, vomiting.  Patient denies any past suicidal ideation and/or hospitalizations (records showing she has been admitted to  inpatient 2 years ago).  Denies homicidal ideations.  Patient denies drinking alcohol, using drugs or taking any other medications.    Patient is nontoxic-appearing.  Guarded affect.  No respiratory distress.  Abdomen soft nontender.    Differential diagnoses include but not limited to electrolyte abnormalities, anemia, thyroid pathology, pregnancy.  Concern for suicidal ideation/auditory hallucinations. Psych consult

## 2025-03-12 NOTE — ED ADULT NURSE NOTE - OBJECTIVE STATEMENT
pt states she is hearing voices telling her to kill self. states she has a plan to OD on pills. mother states she has not taken her Psych meds x 3 months. pt states the voices are male and are also reminding her to use her manners.  Patient brought to  area for above complaints. Evaluated by medical provider and labs ordered. Patient states she is actively hearing voices saying "she did it, she did it". She is c/o delusions of seeing people that she does not know. Waiting for results, and psych evaluation.  JESSI Waldrop

## 2025-03-12 NOTE — ED PROVIDER NOTE - ATTESTATION, MLM
Left arm;
I have reviewed and confirmed nurses' notes for patient's medications, allergies, medical history, and surgical history.

## 2025-03-12 NOTE — BH PATIENT PROFILE - FALL HARM RISK - UNIVERSAL INTERVENTIONS
Bed in lowest position, wheels locked, appropriate side rails in place/Call bell, personal items and telephone in reach/Instruct patient to call for assistance before getting out of bed or chair/Non-slip footwear when patient is out of bed/Bandana to call system/Physically safe environment - no spills, clutter or unnecessary equipment/Purposeful Proactive Rounding/Room/bathroom lighting operational, light cord in reach
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
## 2025-03-12 NOTE — ED ADULT TRIAGE NOTE - CHIEF COMPLAINT QUOTE
pt states she is hearing voices telling her to kill self. states she has a plan to OD on pills. mother states she has not taken her Psych meds x 3 months. pt states the voices are male and are also reminding her to use her manners.

## 2025-03-12 NOTE — ED ADULT NURSE NOTE - NSFALLUNIVINTERV_ED_ALL_ED
Bed/Stretcher in lowest position, wheels locked, appropriate side rails in place/Call bell, personal items and telephone in reach/Instruct patient to call for assistance before getting out of bed/chair/stretcher/Non-slip footwear applied when patient is off stretcher/[place] to call system/Physically safe environment - no spills, clutter or unnecessary equipment/Purposeful proactive rounding/Room/bathroom lighting operational, light cord in reach

## 2025-03-12 NOTE — ED BEHAVIORAL HEALTH NOTE - BEHAVIORAL HEALTH NOTE
Writer informed by treatment team that patient is to be admitted to [facility]. Nursing to schedule transport upon medical clearance. mother Ani ([phone]) informed of admission.

## 2025-03-12 NOTE — ED BEHAVIORAL HEALTH ASSESSMENT NOTE - RISK ASSESSMENT
pt is at elevated risk for harm due to mood dysregulation  psychosis   protective factors : supportive network, in school, no hx of sa, no family hx of sa

## 2025-03-12 NOTE — ED PROVIDER NOTE - PROGRESS NOTE DETAILS
ISAIAS Breen: Patient has UA small leuks, negative for nitrites. Urine culture sent. No concern for infection at this time due to few bacteria, epithelial cells. WBC 6.27. Patient will follow up with ongoing UA trend. No intervention at this time.

## 2025-03-12 NOTE — ED BEHAVIORAL HEALTH ASSESSMENT NOTE - SUMMARY
23 y/o female , single, recent [employer] graduate (December),  with no significant medical hx ,who was BIB her mother for medication refills. On exam; endorses recent suicide attempt which was premeditated, currently she has CAH with suicidal content. The pt is non adherent with meds at this time, appears dysthymic has ongoing AH. Pt has a flat affect, non-reactive. She remains a mod-high risk of reattempt, particularly with her CAH to killherself. Pt was offered voluntary admission, to which she accepted. MOther informed.

## 2025-03-12 NOTE — BH CHART NOTE - NSEVENTNOTEFT_PSY_ALL_CORE
BRUNA called to perform psychiatric safety assessment on patient expressing acute suicidality. Patient reports urges to kill self via taking pills and CAH telling her to do the same. Patient denies any intent or plan on doing so while on the unit. Patient agrees to come to staff with any continued or worsening suicidality and is able to contract for safety. At this time patient does not require CO 1:1 to remain safe. Discussed with RN staff to notify BRUNA with any worsening of symptoms.

## 2025-03-13 LAB
CULTURE RESULTS: SIGNIFICANT CHANGE UP
SPECIMEN SOURCE: SIGNIFICANT CHANGE UP

## 2025-03-13 PROCEDURE — 99223 1ST HOSP IP/OBS HIGH 75: CPT

## 2025-03-13 RX ORDER — INFLUENZA A VIRUS A/IDAHO/07/2018 (H1N1) ANTIGEN (MDCK CELL DERIVED, PROPIOLACTONE INACTIVATED, INFLUENZA A VIRUS A/INDIANA/08/2018 (H3N2) ANTIGEN (MDCK CELL DERIVED, PROPIOLACTONE INACTIVATED), INFLUENZA B VIRUS B/SINGAPORE/INFTT-16-0610/2016 ANTIGEN (MDCK CELL DERIVED, PROPIOLACTONE INACTIVATED), INFLUENZA B VIRUS B/IOWA/06/2017 ANTIGEN (MDCK CELL DERIVED, PROPIOLACTONE INACTIVATED) 15; 15; 15; 15 UG/.5ML; UG/.5ML; UG/.5ML; UG/.5ML
0.5 INJECTION, SUSPENSION INTRAMUSCULAR ONCE
Refills: 0 | Status: COMPLETED | OUTPATIENT
Start: 2025-03-13 | End: 2025-03-13

## 2025-03-13 RX ADMIN — Medication 0.5 MILLIGRAM(S): at 09:24

## 2025-03-13 RX ADMIN — Medication 0.5 MILLIGRAM(S): at 20:58

## 2025-03-13 RX ADMIN — NICOTINE POLACRILEX 4 MILLIGRAM(S): 4 GUM, CHEWING ORAL at 12:41

## 2025-03-13 RX ADMIN — INFLUENZA A VIRUS A/IDAHO/07/2018 (H1N1) ANTIGEN (MDCK CELL DERIVED, PROPIOLACTONE INACTIVATED, INFLUENZA A VIRUS A/INDIANA/08/2018 (H3N2) ANTIGEN (MDCK CELL DERIVED, PROPIOLACTONE INACTIVATED), INFLUENZA B VIRUS B/SINGAPORE/INFTT-16-0610/2016 ANTIGEN (MDCK CELL DERIVED, PROPIOLACTONE INACTIVATED), INFLUENZA B VIRUS B/IOWA/06/2017 ANTIGEN (MDCK CELL DERIVED, PROPIOLACTONE INACTIVATED) 0.5 MILLILITER(S): 15; 15; 15; 15 INJECTION, SUSPENSION INTRAMUSCULAR at 09:51

## 2025-03-13 RX ADMIN — Medication 2 MILLIGRAM(S): at 23:34

## 2025-03-13 RX ADMIN — NICOTINE POLACRILEX 4 MILLIGRAM(S): 4 GUM, CHEWING ORAL at 20:58

## 2025-03-13 RX ADMIN — HALOPERIDOL 5 MILLIGRAM(S): 10 TABLET ORAL at 23:33

## 2025-03-13 NOTE — BH INPATIENT PSYCHIATRY ASSESSMENT NOTE - CURRENT MEDICATION
MEDICATIONS  (STANDING):  risperiDONE   Tablet 0.5 milliGRAM(s) Oral two times a day    MEDICATIONS  (PRN):  acetaminophen     Tablet .. 650 milliGRAM(s) Oral every 6 hours PRN Moderate Pain (4 - 6)  haloperidol     Tablet 5 milliGRAM(s) Oral every 8 hours PRN agitation due to psychosis  haloperidol    Injectable 5 milliGRAM(s) IntraMuscular Once PRN combative behavior  LORazepam     Tablet 2 milliGRAM(s) Oral every 8 hours PRN Anxiety  melatonin 3 milliGRAM(s) Oral at bedtime PRN Insomnia  nicotine  Polacrilex Gum 4 milliGRAM(s) Oral every 2 hours PRN Smoking Cessation

## 2025-03-13 NOTE — BH INPATIENT PSYCHIATRY ASSESSMENT NOTE - NSBHCHARTREVIEWVS_PSY_A_CORE FT
Vital Signs Last 24 Hrs  T(C): 36.7 (03-13-25 @ 08:07), Max: 36.7 (03-12-25 @ 17:06)  T(F): 98.1 (03-13-25 @ 08:07), Max: 98.1 (03-13-25 @ 08:07)  HR: 99 (03-12-25 @ 17:06) (99 - 99)  BP: 112/80 (03-12-25 @ 17:06) (112/80 - 112/80)  BP(mean): --  RR: 18 (03-12-25 @ 17:51) (18 - 18)  SpO2: 100% (03-12-25 @ 17:51) (99% - 100%)    Orthostatic VS  03-13-25 @ 08:07  Lying BP: --/-- HR: --  Sitting BP: 108/69 HR: 96  Standing BP: 104/67 HR: 115  Site: --  Mode: --  Orthostatic VS  03-12-25 @ 17:51  Lying BP: --/-- HR: --  Sitting BP: 110/72 HR: 101  Standing BP: 112/79 HR: 112  Site: --  Mode: --
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
## 2025-03-13 NOTE — BH INPATIENT PSYCHIATRY ASSESSMENT NOTE - NSBHASSESSSUMMFT_PSY_ALL_CORE
Patient is a 24-year-old female with a history of psychosis BIB mother for a medication refill due to psychotic symptoms. The patient has been reporting command auditory hallucinations of voices telling her to hurt herself, as well as delusions that everyone at her new workplace “hate her“. The patient also had an impulsive suicidal overdose on Friday due to stress from school and interpersonal conflict with her sister. The patient reports chronic feelings of emptiness, mood lability, fears of abandonment, and psychotic symptoms during times of stress. The symptoms appear most consistent with borderline personality disorder. Also in the differential are major depressive disorder with psychotic features and brief psychotic disorder. Given her recent suicidal overdose and lack of current remorse, she is a danger to herself and therefore meets criteria for inpatient psychiatric hospitalization.  SH: . Graduated from [employer]  PsyHx: No SA, no NSSIB.  3x hospitalizations.    1. Admission status  9.13 (Voluntary)    2. Significant lab findings  None    3. Psychotropic medication  - Risperidone 0.5 mg BID (psychosis)  	- Home medication    Agitation PRNs: Haloperidol PO/IM, Lorazepam PO/IM    4. Medical conditions, other medication, consults  None    5. Psychosocial interventions  - Patient provided verbal consent to speak with TBD  - CO 1:1: Not required  - Restrictions/allowances: None
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
## 2025-03-13 NOTE — BH INPATIENT PSYCHIATRY ASSESSMENT NOTE - NSBHMETABOLIC_PSY_ALL_CORE_FT
BMI: BMI (kg/m2): 29.6 (03-12-25 @ 17:51)  HbA1c:   Glucose:   BP: 112/80 (03-12-25 @ 17:06) (112/80 - 115/77)Vital Signs Last 24 Hrs  T(C): 36.7 (03-13-25 @ 08:07), Max: 36.7 (03-12-25 @ 17:06)  T(F): 98.1 (03-13-25 @ 08:07), Max: 98.1 (03-13-25 @ 08:07)  HR: 99 (03-12-25 @ 17:06) (99 - 99)  BP: 112/80 (03-12-25 @ 17:06) (112/80 - 112/80)  BP(mean): --  RR: 18 (03-12-25 @ 17:51) (18 - 18)  SpO2: 100% (03-12-25 @ 17:51) (99% - 100%)    Orthostatic VS  03-13-25 @ 08:07  Lying BP: --/-- HR: --  Sitting BP: 108/69 HR: 96  Standing BP: 104/67 HR: 115  Site: --  Mode: --  Orthostatic VS  03-12-25 @ 17:51  Lying BP: --/-- HR: --  Sitting BP: 110/72 HR: 101  Standing BP: 112/79 HR: 112  Site: --  Mode: --    Lipid Panel:

## 2025-03-13 NOTE — PSYCHIATRIC REHAB INITIAL EVALUATION - NSBHPRRECOMMEND_PSY_ALL_CORE
Writer attempted to meet with the patient in order to orient patient to unit and to establish a Psychiatric Rehabilitation goal. Upon several attempts, patient declined to meet with writer and was observed to be crying in bedroom. Ongoing support offered. Patient was encouraged to connect with at a later time. Psychiatric Rehabilitation staff will attempt to meet with patient at a later time in order to provide an appropriate orientation to programming. As an initial interview did not take place, information included in this initial assessment has been obtained per chart. Patient was admitted to [facility] on 03/12/25 in the context of ongoing "delusions and hallucinations" for the past 2 weeks. As patient was unable to establish a collaborative Psychiatric Rehabilitation goal, a goal will be assigned for her and reassessed at a later time if needed. Psychiatric Rehabilitation staff will continue to engage patient daily in order to develop rapport, provide support and to assist patient in demonstrating progress towards Psychiatric Rehabilitation goals.

## 2025-03-14 ENCOUNTER — EMERGENCY (EMERGENCY)
Facility: HOSPITAL | Age: 25
LOS: 1 days | Discharge: HOME | End: 2025-03-14
Admitting: EMERGENCY MEDICINE
Payer: COMMERCIAL

## 2025-03-14 VITALS
WEIGHT: 154.98 LBS | HEART RATE: 94 BPM | TEMPERATURE: 98 F | DIASTOLIC BLOOD PRESSURE: 67 MMHG | RESPIRATION RATE: 18 BRPM | OXYGEN SATURATION: 99 % | HEIGHT: 60.5 IN | SYSTOLIC BLOOD PRESSURE: 111 MMHG

## 2025-03-14 PROCEDURE — 99232 SBSQ HOSP IP/OBS MODERATE 35: CPT

## 2025-03-14 PROCEDURE — 90853 GROUP PSYCHOTHERAPY: CPT

## 2025-03-14 PROCEDURE — 99283 EMERGENCY DEPT VISIT LOW MDM: CPT

## 2025-03-14 RX ORDER — ACETAMINOPHEN 500 MG/5ML
975 LIQUID (ML) ORAL ONCE
Refills: 0 | Status: COMPLETED | OUTPATIENT
Start: 2025-03-14 | End: 2025-03-14

## 2025-03-14 RX ADMIN — NICOTINE POLACRILEX 4 MILLIGRAM(S): 4 GUM, CHEWING ORAL at 17:54

## 2025-03-14 RX ADMIN — Medication 650 MILLIGRAM(S): at 12:11

## 2025-03-14 RX ADMIN — NICOTINE POLACRILEX 4 MILLIGRAM(S): 4 GUM, CHEWING ORAL at 11:24

## 2025-03-14 RX ADMIN — Medication 975 MILLIGRAM(S): at 16:17

## 2025-03-14 RX ADMIN — NICOTINE POLACRILEX 4 MILLIGRAM(S): 4 GUM, CHEWING ORAL at 09:10

## 2025-03-14 RX ADMIN — Medication 0.5 MILLIGRAM(S): at 09:21
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
## 2025-03-14 NOTE — ED PROVIDER NOTE - PHYSICAL EXAMINATION
CONSTITUTIONAL: Appears well, in no apparent distress  HEAD: Normocephalic, small hematoma above left eyebrow   EENT: PERRL, EOMI w/o pain, no nystagmus, nares patent no drainage, no pharyngeal erythema, swelling, or exudates  NECK: Trachea midline, no goiter  RESP: L/S equal clr, bilat, apices and bases, no accessory muscle use, speaking full sentences  CARDIC: RRR, +S1/S2, no peripheral edema  GI: ABD soft, nondistended, nontender on palpation, no palpable masses  MSK: 5/5 strength extremities x 4, full ROM without pain, no midline spinal tenderness on palpation  SKIN: No rashes, normal color/condition   NEURO: A&OX4, CN intact, No focal motor deficits/weakness, no sensory deficits, no dysmetria, no slurred speech, no facial droop, normal gait

## 2025-03-14 NOTE — ED PROVIDER NOTE - PATIENT PORTAL LINK FT
You can access the FollowMyHealth Patient Portal offered by [facility] by registering at the following website: [email or web address]. By joining [facility]’s FollowMyHealth portal, you will also be able to view your health information using other applications (apps) compatible with our system.
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
## 2025-03-14 NOTE — BH INPATIENT PSYCHIATRY PROGRESS NOTE - NSBHCHARTREVIEWVS_PSY_A_CORE FT
Vital Signs Last 24 Hrs  T(C): 36.6 (03-14-25 @ 07:51), Max: 36.8 (03-13-25 @ 20:17)  T(F): 97.9 (03-14-25 @ 07:51), Max: 98.3 (03-13-25 @ 20:17)  HR: --  BP: --  BP(mean): --  RR: 17 (03-14-25 @ 07:51) (17 - 17)  SpO2: --    Orthostatic VS  03-14-25 @ 07:51  Lying BP: --/-- HR: --  Sitting BP: 97/60 HR: 102  Standing BP: 105/67 HR: 105  Site: --  Mode: --  Orthostatic VS  03-13-25 @ 08:07  Lying BP: --/-- HR: --  Sitting BP: 108/69 HR: 96  Standing BP: 104/67 HR: 115  Site: --  Mode: --  Orthostatic VS  03-12-25 @ 17:51  Lying BP: --/-- HR: --  Sitting BP: 110/72 HR: 101  Standing BP: 112/79 HR: 112  Site: --  Mode: --

## 2025-03-14 NOTE — ED ADULT TRIAGE NOTE - STATUS:
Applied Initiate Treatment: Prednisone 20 mg QD Render In Strict Bullet Format?: No Detail Level: Zone Plan: Will plan patch testing in the future. Samples Given: Impoyz 0.025% BID
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
## 2025-03-14 NOTE — BH SOCIAL WORK INITIAL PSYCHOSOCIAL EVALUATION - OTHER PAST PSYCHIATRIC HISTORY (INCLUDE DETAILS REGARDING ONSET, COURSE OF ILLNESS, INPATIENT/OUTPATIENT TREATMENT)
23 y/o female , single, recent [email or web address] graduate (December),  with no significant medical hx ,who was BIB her mother for medication refills.
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
## 2025-03-14 NOTE — BH INPATIENT PSYCHIATRY PROGRESS NOTE - NSBHMETABOLIC_PSY_ALL_CORE_FT
BMI: BMI (kg/m2): 29.6 (03-12-25 @ 17:51)  HbA1c:   Glucose:   BP: 112/80 (03-12-25 @ 17:06) (112/80 - 115/77)Vital Signs Last 24 Hrs  T(C): 36.6 (03-14-25 @ 07:51), Max: 36.8 (03-13-25 @ 20:17)  T(F): 97.9 (03-14-25 @ 07:51), Max: 98.3 (03-13-25 @ 20:17)  HR: --  BP: --  BP(mean): --  RR: 17 (03-14-25 @ 07:51) (17 - 17)  SpO2: --    Orthostatic VS  03-14-25 @ 07:51  Lying BP: --/-- HR: --  Sitting BP: 97/60 HR: 102  Standing BP: 105/67 HR: 105  Site: --  Mode: --  Orthostatic VS  03-13-25 @ 08:07  Lying BP: --/-- HR: --  Sitting BP: 108/69 HR: 96  Standing BP: 104/67 HR: 115  Site: --  Mode: --  Orthostatic VS  03-12-25 @ 17:51  Lying BP: --/-- HR: --  Sitting BP: 110/72 HR: 101  Standing BP: 112/79 HR: 112  Site: --  Mode: --    Lipid Panel:

## 2025-03-14 NOTE — BH PSYCHOLOGY - GROUP THERAPY NOTE - NSPSYCHOLGRPDBTGOAL_PSY_A_CORE
reduce mood and affective lability/reduce impulsive self-defeating behavior/reduce suicidal ideation/risk of attempt/improve ability to indentify feelings/improve ability to communicate feelings/reduce vulnerability to emotional dysregualation/promote skills to reduce anger
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
## 2025-03-14 NOTE — BH PSYCHOLOGY - GROUP THERAPY NOTE - NSPSYCHOLGRPCOGINT_PSY_A_CORE FT
[employer] is an ACT-based group in which patients learn skills and explore themes of identity, compassion, resilience, and connection through the lens of marginalized identity. Group begins with setting group expectations and introductions that focus on identity exploration. Following introductions, the daily theme is presented through both didactics and experiential activities. Group today focused on the theme “resilience.” Patients were provided psychoeducation about resilience and engaged in discussion about the importance of developing resilience to cope with stressful times.  led patients in an experiential exercise that focused on positive inner self-talk and growing resilience.

## 2025-03-15 PROCEDURE — 99232 SBSQ HOSP IP/OBS MODERATE 35: CPT

## 2025-03-15 RX ORDER — LORAZEPAM 4 MG/ML
2 VIAL (ML) INJECTION ONCE
Refills: 0 | Status: DISCONTINUED | OUTPATIENT
Start: 2025-03-15 | End: 2025-03-19

## 2025-03-15 RX ORDER — DIPHENHYDRAMINE HCL 12.5MG/5ML
50 ELIXIR ORAL ONCE
Refills: 0 | Status: COMPLETED | OUTPATIENT
Start: 2025-03-15 | End: 2025-03-15

## 2025-03-15 RX ORDER — TRAZODONE HCL 100 MG
50 TABLET ORAL ONCE
Refills: 0 | Status: COMPLETED | OUTPATIENT
Start: 2025-03-15 | End: 2025-03-15

## 2025-03-15 RX ADMIN — NICOTINE POLACRILEX 4 MILLIGRAM(S): 4 GUM, CHEWING ORAL at 08:32

## 2025-03-15 RX ADMIN — NICOTINE POLACRILEX 4 MILLIGRAM(S): 4 GUM, CHEWING ORAL at 17:17

## 2025-03-15 RX ADMIN — Medication 3 MILLIGRAM(S): at 02:18

## 2025-03-15 RX ADMIN — NICOTINE POLACRILEX 4 MILLIGRAM(S): 4 GUM, CHEWING ORAL at 02:19

## 2025-03-15 RX ADMIN — NICOTINE POLACRILEX 4 MILLIGRAM(S): 4 GUM, CHEWING ORAL at 13:01

## 2025-03-15 RX ADMIN — Medication 0.5 MILLIGRAM(S): at 21:10

## 2025-03-15 RX ADMIN — Medication 50 MILLIGRAM(S): at 21:10

## 2025-03-15 RX ADMIN — NICOTINE POLACRILEX 4 MILLIGRAM(S): 4 GUM, CHEWING ORAL at 22:45

## 2025-03-15 RX ADMIN — Medication 50 MILLIGRAM(S): at 22:38

## 2025-03-15 RX ADMIN — Medication 0.5 MILLIGRAM(S): at 08:32

## 2025-03-15 RX ADMIN — NICOTINE POLACRILEX 4 MILLIGRAM(S): 4 GUM, CHEWING ORAL at 05:02

## 2025-03-15 NOTE — BH INPATIENT PSYCHIATRY PROGRESS NOTE - NSBHCHARTREVIEWVS_PSY_A_CORE FT
Vital Signs Last 24 Hrs  T(C): 36.7 (03-14-25 @ 17:10), Max: 36.7 (03-14-25 @ 17:10)  T(F): 98.1 (03-14-25 @ 17:10), Max: 98.1 (03-14-25 @ 17:10)  HR: 94 (03-14-25 @ 15:33) (94 - 94)  BP: 111/67 (03-14-25 @ 15:33) (111/67 - 111/67)  BP(mean): --  RR: 18 (03-14-25 @ 17:10) (18 - 18)  SpO2: 99% (03-14-25 @ 15:33) (99% - 99%)    Orthostatic VS  03-14-25 @ 17:10  Lying BP: --/-- HR: --  Sitting BP: 106/68 HR: 90  Standing BP: 100/69 HR: 90  Site: --  Mode: --  Orthostatic VS  03-14-25 @ 07:51  Lying BP: --/-- HR: --  Sitting BP: 97/60 HR: 102  Standing BP: 105/67 HR: 105  Site: --  Mode: --
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
## 2025-03-15 NOTE — BH INPATIENT PSYCHIATRY PROGRESS NOTE - NSBHMETABOLIC_PSY_ALL_CORE_FT
BMI: BMI (kg/m2): 26 (03-14-25 @ 17:10)  HbA1c:   Glucose:   BP: 112/80 (03-12-25 @ 17:06) (112/80 - 115/77)Vital Signs Last 24 Hrs  T(C): 36.7 (03-14-25 @ 17:10), Max: 36.7 (03-14-25 @ 17:10)  T(F): 98.1 (03-14-25 @ 17:10), Max: 98.1 (03-14-25 @ 17:10)  HR: 94 (03-14-25 @ 15:33) (94 - 94)  BP: 111/67 (03-14-25 @ 15:33) (111/67 - 111/67)  BP(mean): --  RR: 18 (03-14-25 @ 17:10) (18 - 18)  SpO2: 99% (03-14-25 @ 15:33) (99% - 99%)    Orthostatic VS  03-14-25 @ 17:10  Lying BP: --/-- HR: --  Sitting BP: 106/68 HR: 90  Standing BP: 100/69 HR: 90  Site: --  Mode: --  Orthostatic VS  03-14-25 @ 07:51  Lying BP: --/-- HR: --  Sitting BP: 97/60 HR: 102  Standing BP: 105/67 HR: 105  Site: --  Mode: --    Lipid Panel:

## 2025-03-16 PROCEDURE — 99232 SBSQ HOSP IP/OBS MODERATE 35: CPT

## 2025-03-16 RX ADMIN — NICOTINE POLACRILEX 4 MILLIGRAM(S): 4 GUM, CHEWING ORAL at 21:07

## 2025-03-16 RX ADMIN — Medication 0.5 MILLIGRAM(S): at 08:16

## 2025-03-16 RX ADMIN — Medication 0.5 MILLIGRAM(S): at 21:07

## 2025-03-16 RX ADMIN — NICOTINE POLACRILEX 4 MILLIGRAM(S): 4 GUM, CHEWING ORAL at 12:58

## 2025-03-16 RX ADMIN — NICOTINE POLACRILEX 4 MILLIGRAM(S): 4 GUM, CHEWING ORAL at 16:17

## 2025-03-16 RX ADMIN — NICOTINE POLACRILEX 4 MILLIGRAM(S): 4 GUM, CHEWING ORAL at 06:32

## 2025-03-16 RX ADMIN — Medication 3 MILLIGRAM(S): at 21:06

## 2025-03-16 RX ADMIN — NICOTINE POLACRILEX 4 MILLIGRAM(S): 4 GUM, CHEWING ORAL at 08:59

## 2025-03-16 NOTE — BH INPATIENT PSYCHIATRY PROGRESS NOTE - NSBHMETABOLIC_PSY_ALL_CORE_FT
BMI: BMI (kg/m2): 26 (03-14-25 @ 17:10)  HbA1c:   Glucose:   BP: --Vital Signs Last 24 Hrs  T(C): 36.4 (03-15-25 @ 09:15), Max: 36.4 (03-15-25 @ 09:15)  T(F): 97.5 (03-15-25 @ 09:15), Max: 97.5 (03-15-25 @ 09:15)  HR: --  BP: --  BP(mean): --  RR: 18 (03-15-25 @ 09:15) (18 - 18)  SpO2: --    Orthostatic VS  03-15-25 @ 09:15  Lying BP: --/-- HR: --  Sitting BP: 112/69 HR: 91  Standing BP: 109/71 HR: 102  Site: --  Mode: --  Orthostatic VS  03-14-25 @ 17:10  Lying BP: --/-- HR: --  Sitting BP: 106/68 HR: 90  Standing BP: 100/69 HR: 90  Site: --  Mode: --  Orthostatic VS  03-14-25 @ 07:51  Lying BP: --/-- HR: --  Sitting BP: 97/60 HR: 102  Standing BP: 105/67 HR: 105  Site: --  Mode: --    Lipid Panel:  BMI: BMI (kg/m2): 26 (03-14-25 @ 17:10)  HbA1c:   Glucose:   BP: --Vital Signs Last 24 Hrs  T(C): 36.3 (03-16-25 @ 08:31), Max: 36.3 (03-16-25 @ 08:31)  T(F): 97.4 (03-16-25 @ 08:31), Max: 97.4 (03-16-25 @ 08:31)  HR: --  BP: --  BP(mean): --  RR: 18 (03-16-25 @ 08:31) (18 - 18)  SpO2: --    Orthostatic VS  03-16-25 @ 08:31  Lying BP: --/-- HR: --  Sitting BP: 108/68 HR: 92  Standing BP: 105/69 HR: 106  Site: --  Mode: --  Orthostatic VS  03-15-25 @ 09:15  Lying BP: --/-- HR: --  Sitting BP: 112/69 HR: 91  Standing BP: 109/71 HR: 102  Site: --  Mode: --  Orthostatic VS  03-14-25 @ 17:10  Lying BP: --/-- HR: --  Sitting BP: 106/68 HR: 90  Standing BP: 100/69 HR: 90  Site: --  Mode: --    Lipid Panel:

## 2025-03-16 NOTE — BH INPATIENT PSYCHIATRY PROGRESS NOTE - CURRENT MEDICATION
MEDICATIONS  (STANDING):  risperiDONE   Tablet 0.5 milliGRAM(s) Oral two times a day    MEDICATIONS  (PRN):  acetaminophen     Tablet .. 650 milliGRAM(s) Oral every 6 hours PRN Moderate Pain (4 - 6)  haloperidol     Tablet 5 milliGRAM(s) Oral every 8 hours PRN agitation due to psychosis  haloperidol    Injectable 5 milliGRAM(s) IntraMuscular Once PRN combative behavior  LORazepam     Tablet 2 milliGRAM(s) Oral every 8 hours PRN Anxiety  LORazepam   Injectable 2 milliGRAM(s) IntraMuscular once PRN agitation in setting of psychosis  melatonin 3 milliGRAM(s) Oral at bedtime PRN Insomnia  nicotine  Polacrilex Gum 4 milliGRAM(s) Oral every 2 hours PRN Smoking Cessation

## 2025-03-16 NOTE — BH INPATIENT PSYCHIATRY PROGRESS NOTE - NSBHFUPINTERVALHXFT_PSY_A_CORE
Transfer from . Patient is seen for psychosis, and recent SA by way of overdose.  Chart and medications reviewed. Appetite and sleep maintained. Remains compliant with medications, no SE reported. No behavioral issues, no prns for aggression.     Patient is seen in dayroom, sitting with peers.  She is calm and cooperative upon approach.  Patient reports feeling “better”  denies any anxiety or mood dysregulation.  Patient denies any current urges to self-harm.  Reports that SI thoughts have decreased significantly in intensity and frequency. no SI/SIB, no plan or intent at this time, engages in safety planning.    No overt psychotic trend. Pt denies AVH, delusions or paranoia.  No medical concerns. Continue to monitor and provide therapeutic support.

## 2025-03-16 NOTE — BH INPATIENT PSYCHIATRY PROGRESS NOTE - NSBHCHARTREVIEWVS_PSY_A_CORE FT
Vital Signs Last 24 Hrs  T(C): 36.4 (03-15-25 @ 09:15), Max: 36.4 (03-15-25 @ 09:15)  T(F): 97.5 (03-15-25 @ 09:15), Max: 97.5 (03-15-25 @ 09:15)  HR: --  BP: --  BP(mean): --  RR: 18 (03-15-25 @ 09:15) (18 - 18)  SpO2: --    Orthostatic VS  03-15-25 @ 09:15  Lying BP: --/-- HR: --  Sitting BP: 112/69 HR: 91  Standing BP: 109/71 HR: 102  Site: --  Mode: --  Orthostatic VS  03-14-25 @ 17:10  Lying BP: --/-- HR: --  Sitting BP: 106/68 HR: 90  Standing BP: 100/69 HR: 90  Site: --  Mode: --  Orthostatic VS  03-14-25 @ 07:51  Lying BP: --/-- HR: --  Sitting BP: 97/60 HR: 102  Standing BP: 105/67 HR: 105  Site: --  Mode: --   Vital Signs Last 24 Hrs  T(C): 36.3 (03-16-25 @ 08:31), Max: 36.3 (03-16-25 @ 08:31)  T(F): 97.4 (03-16-25 @ 08:31), Max: 97.4 (03-16-25 @ 08:31)  HR: --  BP: --  BP(mean): --  RR: 18 (03-16-25 @ 08:31) (18 - 18)  SpO2: --    Orthostatic VS  03-16-25 @ 08:31  Lying BP: --/-- HR: --  Sitting BP: 108/68 HR: 92  Standing BP: 105/69 HR: 106  Site: --  Mode: --  Orthostatic VS  03-15-25 @ 09:15  Lying BP: --/-- HR: --  Sitting BP: 112/69 HR: 91  Standing BP: 109/71 HR: 102  Site: --  Mode: --  Orthostatic VS  03-14-25 @ 17:10  Lying BP: --/-- HR: --  Sitting BP: 106/68 HR: 90  Standing BP: 100/69 HR: 90  Site: --  Mode: --

## 2025-03-17 PROCEDURE — 99232 SBSQ HOSP IP/OBS MODERATE 35: CPT

## 2025-03-17 RX ORDER — RISPERIDONE 4 MG
2 TABLET ORAL AT BEDTIME
Refills: 0 | Status: DISCONTINUED | OUTPATIENT
Start: 2025-03-17 | End: 2025-03-19

## 2025-03-17 RX ADMIN — Medication 2 MILLIGRAM(S): at 00:22

## 2025-03-17 RX ADMIN — NICOTINE POLACRILEX 4 MILLIGRAM(S): 4 GUM, CHEWING ORAL at 08:05

## 2025-03-17 RX ADMIN — Medication 0.5 MILLIGRAM(S): at 08:04

## 2025-03-17 RX ADMIN — Medication 2 MILLIGRAM(S): at 20:47

## 2025-03-17 RX ADMIN — NICOTINE POLACRILEX 4 MILLIGRAM(S): 4 GUM, CHEWING ORAL at 20:07

## 2025-03-17 RX ADMIN — HALOPERIDOL 5 MILLIGRAM(S): 10 TABLET ORAL at 09:39

## 2025-03-17 NOTE — BH INPATIENT PSYCHIATRY PROGRESS NOTE - ATTENDING COMMENTS
Care was discussed and reviewed in the interdisciplinary treatment team.  I, Gladys Meraz MD, have reviewed and verified the documentation.  I independently performed the documented medical decision making.    Patient was seen and evaluated with the NP present during the assessment. Patient verbalized that she did took an OD of Benadryl as she was hearing voices commanding her to do so. Patient has not been following with her psychiatric NP for the last 3 months and she has not been taking her medications. In the unit patient remains paranoid about the staff, she has continue to hear voices that are commending in nature. This voices are still telling her to harm herself. Patient has been asking about the [facility] an she was advised to speak with the [facility] .   I met with the patient later in the afternoon. I review with the patient the plans to refer her to the [facility]. Patient agreed but said that she want to leave in a few days. I informed the patient that is going to take time to stabilize her. Patient then said that she was going ask for a discharge. Again instructed the patient to speak with the [facility] .

## 2025-03-17 NOTE — BH INPATIENT PSYCHIATRY PROGRESS NOTE - NSBHMETABOLIC_PSY_ALL_CORE_FT
BMI: BMI (kg/m2): 26 (03-14-25 @ 17:10)  HbA1c:   Glucose:   BP: --Vital Signs Last 24 Hrs  T(C): 36.8 (03-17-25 @ 08:02), Max: 36.8 (03-17-25 @ 08:02)  T(F): 98.2 (03-17-25 @ 08:02), Max: 98.2 (03-17-25 @ 08:02)  HR: --  BP: --  BP(mean): --  RR: --  SpO2: --    Orthostatic VS  03-17-25 @ 08:02  Lying BP: --/-- HR: --  Sitting BP: 106/76 HR: 100  Standing BP: 109/66 HR: 105  Site: --  Mode: --  Orthostatic VS  03-16-25 @ 08:31  Lying BP: --/-- HR: --  Sitting BP: 108/68 HR: 92  Standing BP: 105/69 HR: 106  Site: --  Mode: --    Lipid Panel:
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
## 2025-03-17 NOTE — BH INPATIENT PSYCHIATRY PROGRESS NOTE - CURRENT MEDICATION
MEDICATIONS  (STANDING):  risperiDONE   Tablet 0.5 milliGRAM(s) Oral two times a day    MEDICATIONS  (PRN):  acetaminophen     Tablet .. 650 milliGRAM(s) Oral every 6 hours PRN Moderate Pain (4 - 6)  haloperidol     Tablet 5 milliGRAM(s) Oral every 8 hours PRN agitation due to psychosis  haloperidol    Injectable 5 milliGRAM(s) IntraMuscular Once PRN combative behavior  LORazepam     Tablet 2 milliGRAM(s) Oral every 8 hours PRN Anxiety  LORazepam   Injectable 2 milliGRAM(s) IntraMuscular once PRN agitation in setting of psychosis  melatonin 3 milliGRAM(s) Oral at bedtime PRN Insomnia  nicotine  Polacrilex Gum 4 milliGRAM(s) Oral every 2 hours PRN Smoking Cessation   MEDICATIONS  (STANDING):  risperiDONE   Tablet 2 milliGRAM(s) Oral at bedtime    MEDICATIONS  (PRN):  acetaminophen     Tablet .. 650 milliGRAM(s) Oral every 6 hours PRN Moderate Pain (4 - 6)  haloperidol     Tablet 5 milliGRAM(s) Oral every 8 hours PRN agitation due to psychosis  haloperidol    Injectable 5 milliGRAM(s) IntraMuscular Once PRN combative behavior  LORazepam     Tablet 2 milliGRAM(s) Oral every 8 hours PRN Anxiety  LORazepam   Injectable 2 milliGRAM(s) IntraMuscular once PRN agitation in setting of psychosis  melatonin 3 milliGRAM(s) Oral at bedtime PRN Insomnia  nicotine  Polacrilex Gum 4 milliGRAM(s) Oral every 2 hours PRN Smoking Cessation

## 2025-03-17 NOTE — BH INPATIENT PSYCHIATRY PROGRESS NOTE - NSBHCHARTREVIEWVS_PSY_A_CORE FT
Vital Signs Last 24 Hrs  T(C): 36.8 (03-17-25 @ 08:02), Max: 36.8 (03-17-25 @ 08:02)  T(F): 98.2 (03-17-25 @ 08:02), Max: 98.2 (03-17-25 @ 08:02)  HR: --  BP: --  BP(mean): --  RR: --  SpO2: --    Orthostatic VS  03-17-25 @ 08:02  Lying BP: --/-- HR: --  Sitting BP: 106/76 HR: 100  Standing BP: 109/66 HR: 105  Site: --  Mode: --  Orthostatic VS  03-16-25 @ 08:31  Lying BP: --/-- HR: --  Sitting BP: 108/68 HR: 92  Standing BP: 105/69 HR: 106  Site: --  Mode: --

## 2025-03-18 PROCEDURE — 99232 SBSQ HOSP IP/OBS MODERATE 35: CPT

## 2025-03-18 RX ORDER — RISPERIDONE 4 MG
200 TABLET ORAL
Qty: 1 | Refills: 0
Start: 2025-03-18 | End: 2025-03-18

## 2025-03-18 RX ORDER — IBUPROFEN 200 MG
600 TABLET ORAL ONCE
Refills: 0 | Status: COMPLETED | OUTPATIENT
Start: 2025-03-18 | End: 2025-03-18

## 2025-03-18 RX ADMIN — Medication 600 MILLIGRAM(S): at 17:18

## 2025-03-18 RX ADMIN — NICOTINE POLACRILEX 4 MILLIGRAM(S): 4 GUM, CHEWING ORAL at 20:32

## 2025-03-18 RX ADMIN — NICOTINE POLACRILEX 4 MILLIGRAM(S): 4 GUM, CHEWING ORAL at 15:54

## 2025-03-18 RX ADMIN — NICOTINE POLACRILEX 4 MILLIGRAM(S): 4 GUM, CHEWING ORAL at 06:55

## 2025-03-18 RX ADMIN — NICOTINE POLACRILEX 4 MILLIGRAM(S): 4 GUM, CHEWING ORAL at 09:45

## 2025-03-18 RX ADMIN — NICOTINE POLACRILEX 4 MILLIGRAM(S): 4 GUM, CHEWING ORAL at 12:37

## 2025-03-18 RX ADMIN — Medication 650 MILLIGRAM(S): at 10:25

## 2025-03-18 RX ADMIN — Medication 650 MILLIGRAM(S): at 09:44

## 2025-03-18 RX ADMIN — Medication 2 MILLIGRAM(S): at 20:32

## 2025-03-18 RX ADMIN — Medication 600 MILLIGRAM(S): at 13:48

## 2025-03-18 NOTE — BH INPATIENT PSYCHIATRY PROGRESS NOTE - NSBHMETABOLIC_PSY_ALL_CORE_FT
BMI: BMI (kg/m2): 26 (03-14-25 @ 17:10)  HbA1c:   Glucose:   BP: --Vital Signs Last 24 Hrs  T(C): 36.4 (03-18-25 @ 08:44), Max: 36.4 (03-18-25 @ 08:44)  T(F): 97.5 (03-18-25 @ 08:44), Max: 97.5 (03-18-25 @ 08:44)  HR: --  BP: --  BP(mean): --  RR: --  SpO2: --    Orthostatic VS  03-18-25 @ 08:44  Lying BP: --/-- HR: --  Sitting BP: 105/71 HR: 86  Standing BP: 102/71 HR: 100  Site: --  Mode: --  Orthostatic VS  03-17-25 @ 08:02  Lying BP: --/-- HR: --  Sitting BP: 106/76 HR: 100  Standing BP: 109/66 HR: 105  Site: --  Mode: --    Lipid Panel:

## 2025-03-18 NOTE — BH INPATIENT PSYCHIATRY PROGRESS NOTE - NSBHCHARTREVIEWVS_PSY_A_CORE FT
Vital Signs Last 24 Hrs  T(C): 36.4 (03-18-25 @ 08:44), Max: 36.4 (03-18-25 @ 08:44)  T(F): 97.5 (03-18-25 @ 08:44), Max: 97.5 (03-18-25 @ 08:44)  HR: --  BP: --  BP(mean): --  RR: --  SpO2: --    Orthostatic VS  03-18-25 @ 08:44  Lying BP: --/-- HR: --  Sitting BP: 105/71 HR: 86  Standing BP: 102/71 HR: 100  Site: --  Mode: --  Orthostatic VS  03-17-25 @ 08:02  Lying BP: --/-- HR: --  Sitting BP: 106/76 HR: 100  Standing BP: 109/66 HR: 105  Site: --  Mode: --

## 2025-03-18 NOTE — BH INPATIENT PSYCHIATRY PROGRESS NOTE - NSBHFUPINTERVALHXFT_PSY_A_CORE
f/up psychosis, care discussed w/ tx team, VSS. seen patient in conference room with Dr. Sheffield, Patient appeared to be distressed, anxious, poor eye contact, continued to reported AH, voices stating "oh, she's going to cut herself". Patient reported feeling "confused", reported that the voices say many things. Reported sleeping well and with fair appetite. Continues to feel that staff are against her. Discussed starting Uzedy, and patient was interested. Reported mother visited last night. Tolerating Risperdal, no SE reported. no td, eps or tremors observed/ reported.

## 2025-03-18 NOTE — BH INPATIENT PSYCHIATRY PROGRESS NOTE - CURRENT MEDICATION
MEDICATIONS  (STANDING):  risperiDONE   Tablet 2 milliGRAM(s) Oral at bedtime    MEDICATIONS  (PRN):  acetaminophen     Tablet .. 650 milliGRAM(s) Oral every 6 hours PRN Moderate Pain (4 - 6)  haloperidol     Tablet 5 milliGRAM(s) Oral every 8 hours PRN agitation due to psychosis  haloperidol    Injectable 5 milliGRAM(s) IntraMuscular Once PRN combative behavior  LORazepam     Tablet 2 milliGRAM(s) Oral every 8 hours PRN Anxiety  LORazepam   Injectable 2 milliGRAM(s) IntraMuscular once PRN agitation in setting of psychosis  melatonin 3 milliGRAM(s) Oral at bedtime PRN Insomnia  nicotine  Polacrilex Gum 4 milliGRAM(s) Oral every 2 hours PRN Smoking Cessation

## 2025-03-18 NOTE — BH INPATIENT PSYCHIATRY PROGRESS NOTE - ATTENDING COMMENTS
Care was discussed and reviewed in the interdisciplinary treatment team.  I, Gladys Meraz MD, have reviewed and verified the documentation.  I independently performed the documented medical decision making.      Patient was seen and evaluated today by NP and this writer. Patient verbalized that she is feeling confused. She continues to endorse having AH telling her "all types of things". She is aware that this are not her thoughts. This voices ate also saying "Oh you are going to cut your self". She doesn't want to harm her self and is sanjeev for safety. She remains suspicious, isolative and withdrawn. Patient denies any side effects from her medications. She has been educated about the FERNANDO of Risperdal. Patient is interested in receiving the Uzedy and today she was less focus on leaving the hospital.

## 2025-03-19 PROCEDURE — 99232 SBSQ HOSP IP/OBS MODERATE 35: CPT

## 2025-03-19 RX ORDER — LORAZEPAM 4 MG/ML
2 VIAL (ML) INJECTION ONCE
Refills: 0 | Status: DISCONTINUED | OUTPATIENT
Start: 2025-03-19 | End: 2025-03-26

## 2025-03-19 RX ORDER — LORAZEPAM 4 MG/ML
2 VIAL (ML) INJECTION EVERY 8 HOURS
Refills: 0 | Status: DISCONTINUED | OUTPATIENT
Start: 2025-03-19 | End: 2025-03-26

## 2025-03-19 RX ORDER — RISPERIDONE 4 MG
200 TABLET ORAL
Qty: 1 | Refills: 0
Start: 2025-03-19 | End: 2025-03-19

## 2025-03-19 RX ORDER — RISPERIDONE 4 MG
3 TABLET ORAL AT BEDTIME
Refills: 0 | Status: DISCONTINUED | OUTPATIENT
Start: 2025-03-19 | End: 2025-03-21

## 2025-03-19 RX ADMIN — Medication 3 MILLIGRAM(S): at 20:43

## 2025-03-19 RX ADMIN — NICOTINE POLACRILEX 4 MILLIGRAM(S): 4 GUM, CHEWING ORAL at 09:10

## 2025-03-19 RX ADMIN — NICOTINE POLACRILEX 4 MILLIGRAM(S): 4 GUM, CHEWING ORAL at 17:07

## 2025-03-19 RX ADMIN — NICOTINE POLACRILEX 4 MILLIGRAM(S): 4 GUM, CHEWING ORAL at 20:43

## 2025-03-19 RX ADMIN — Medication 650 MILLIGRAM(S): at 22:01

## 2025-03-19 NOTE — BH INPATIENT PSYCHIATRY PROGRESS NOTE - CURRENT MEDICATION
MEDICATIONS  (STANDING):  risperiDONE   Tablet 3 milliGRAM(s) Oral at bedtime    MEDICATIONS  (PRN):  acetaminophen     Tablet .. 650 milliGRAM(s) Oral every 6 hours PRN Moderate Pain (4 - 6)  haloperidol     Tablet 5 milliGRAM(s) Oral every 8 hours PRN agitation due to psychosis  haloperidol    Injectable 5 milliGRAM(s) IntraMuscular Once PRN combative behavior  LORazepam     Tablet 2 milliGRAM(s) Oral every 8 hours PRN Anxiety  LORazepam   Injectable 2 milliGRAM(s) IntraMuscular once PRN agitation in setting of psychosis  melatonin 3 milliGRAM(s) Oral at bedtime PRN Insomnia  nicotine  Polacrilex Gum 4 milliGRAM(s) Oral every 2 hours PRN Smoking Cessation

## 2025-03-19 NOTE — BH INPATIENT PSYCHIATRY PROGRESS NOTE - ATTENDING COMMENTS
Care was discussed and reviewed in the interdisciplinary treatment team.  I, Gladys Meraz MD, have reviewed and verified the documentation.  I independently performed the documented medical decision making.      Patient was seen and evaluated today by NP, SW and this writer. Patient presented as calm but had poor eye contact and remains internally preoccupied. Patient reported that despite feeling a little better she has continue to experience AH telling her to harm her self. Patient denies any intentions of harming her self and was able to contract for safety during the assessment. Patient tells us that yesterday during the group the voices were screaming "I dint like you". Patient understands that the Risperdal will be increased to 3 mg at bedtime. We have provided the patient with education about the use of Risperdal and nicotine. She is in agreement with continuing to increase the dose of Risperdal accordingly. Denies any side effects from the treatment.

## 2025-03-19 NOTE — BH INPATIENT PSYCHIATRY PROGRESS NOTE - NSBHMETABOLIC_PSY_ALL_CORE_FT
BMI: BMI (kg/m2): 26 (03-14-25 @ 17:10)  HbA1c:   Glucose:   BP: --Vital Signs Last 24 Hrs  T(C): 36.8 (03-19-25 @ 08:13), Max: 36.8 (03-19-25 @ 08:13)  T(F): 98.2 (03-19-25 @ 08:13), Max: 98.2 (03-19-25 @ 08:13)  HR: --  BP: --  BP(mean): --  RR: --  SpO2: --    Orthostatic VS  03-19-25 @ 08:13  Lying BP: --/-- HR: --  Sitting BP: 100/66 HR: 70  Standing BP: 93/61 HR: 92  Site: --  Mode: --  Orthostatic VS  03-18-25 @ 08:44  Lying BP: --/-- HR: --  Sitting BP: 105/71 HR: 86  Standing BP: 102/71 HR: 100  Site: --  Mode: --    Lipid Panel:

## 2025-03-19 NOTE — BH INPATIENT PSYCHIATRY PROGRESS NOTE - NSBHFUPINTERVALHXFT_PSY_A_CORE
f/up psychosis, care discussed w/ tx team, VSS. seen patient in conference room with Dr. Sheffield, Patient reported that voices were telling her "I love you" and "tell me about your childhood". Patient reported that the voices were screaming in group yesterday and also they have been telling her to harm self today. Patient denied any plan or intent to harm self or listen to the voices. Patient reported fair sleep and appetite. no td, eps or tremors observed/ reported. Patient reported sister visited, had a pleasant visit. Continues to have suspicions around staff. Encouraged patient to sit out in the milieu. Have been attending groups. taking meds. Questioned if she could stay on 3mg Risperdal, psychoeducation provided and patient verbalized understanding.

## 2025-03-20 PROCEDURE — 99232 SBSQ HOSP IP/OBS MODERATE 35: CPT

## 2025-03-20 RX ORDER — DIPHENHYDRAMINE HCL 12.5MG/5ML
25 ELIXIR ORAL ONCE
Refills: 0 | Status: COMPLETED | OUTPATIENT
Start: 2025-03-20 | End: 2025-03-20

## 2025-03-20 RX ADMIN — Medication 25 MILLIGRAM(S): at 22:40

## 2025-03-20 RX ADMIN — Medication 650 MILLIGRAM(S): at 09:01

## 2025-03-20 RX ADMIN — HALOPERIDOL 5 MILLIGRAM(S): 10 TABLET ORAL at 21:26

## 2025-03-20 RX ADMIN — NICOTINE POLACRILEX 4 MILLIGRAM(S): 4 GUM, CHEWING ORAL at 20:45

## 2025-03-20 RX ADMIN — NICOTINE POLACRILEX 4 MILLIGRAM(S): 4 GUM, CHEWING ORAL at 09:02

## 2025-03-20 RX ADMIN — NICOTINE POLACRILEX 4 MILLIGRAM(S): 4 GUM, CHEWING ORAL at 13:37

## 2025-03-20 RX ADMIN — Medication 3 MILLIGRAM(S): at 20:45

## 2025-03-20 NOTE — BH INPATIENT PSYCHIATRY PROGRESS NOTE - NSBHCHARTREVIEWVS_PSY_A_CORE FT
Vital Signs Last 24 Hrs  T(C): 36.3 (03-20-25 @ 07:18), Max: 36.3 (03-20-25 @ 07:18)  T(F): 97.3 (03-20-25 @ 07:18), Max: 97.3 (03-20-25 @ 07:18)  HR: --  BP: --  BP(mean): --  RR: --  SpO2: --    Orthostatic VS  03-20-25 @ 07:18  Lying BP: --/-- HR: --  Sitting BP: 112/63 HR: 91  Standing BP: 100/65 HR: 121  Site: --  Mode: --  Orthostatic VS  03-19-25 @ 08:13  Lying BP: --/-- HR: --  Sitting BP: 100/66 HR: 70  Standing BP: 93/61 HR: 92  Site: --  Mode: --

## 2025-03-20 NOTE — BH INPATIENT PSYCHIATRY PROGRESS NOTE - NSBHMETABOLIC_PSY_ALL_CORE_FT
BMI: BMI (kg/m2): 26 (03-14-25 @ 17:10)  HbA1c:   Glucose:   BP: --Vital Signs Last 24 Hrs  T(C): 36.3 (03-20-25 @ 07:18), Max: 36.3 (03-20-25 @ 07:18)  T(F): 97.3 (03-20-25 @ 07:18), Max: 97.3 (03-20-25 @ 07:18)  HR: --  BP: --  BP(mean): --  RR: --  SpO2: --    Orthostatic VS  03-20-25 @ 07:18  Lying BP: --/-- HR: --  Sitting BP: 112/63 HR: 91  Standing BP: 100/65 HR: 121  Site: --  Mode: --  Orthostatic VS  03-19-25 @ 08:13  Lying BP: --/-- HR: --  Sitting BP: 100/66 HR: 70  Standing BP: 93/61 HR: 92  Site: --  Mode: --    Lipid Panel:

## 2025-03-20 NOTE — BH INPATIENT PSYCHIATRY PROGRESS NOTE - ATTENDING COMMENTS
Care was discussed and reviewed in the interdisciplinary treatment team.  I, Gladys Meraz MD, have reviewed and verified the documentation.  I independently performed the documented medical decision making.      Patient was seen and evaluated with the NP present during the assessment. Patient reported that she feels that she is improving. Has continue to experience AH but the intensity and frequency has decreased. She did heard someone saying "drug addict". Patient denies any VH and no delusions have been elicited. Patient is denying SI and has been able to contract for safety. She is less suspicious about the staff, has been more able to attend groups and is sleeping better. Patient was educated about the FERNANDO and she remains interested in receiving the medication.  Care was discussed and reviewed in the interdisciplinary treatment team.  I, Gladys Meraz MD, have reviewed and verified the documentation.  I independently performed the documented medical decision making.      Patient was seen and evaluated with the NP present during the assessment. Patient reported that she feels that she is improving. Has continue to experience AH but the intensity and frequency has decreased. She did heard someone saying "drug addict". Patient denies any VH and no delusions have been elicited. Patient is denying SI and has been able to contract for safety. She is less suspicious about the staff, has been more able to attend groups and is sleeping better. Patient was educated about the FERNANDO and she remains interested in receiving the medication.     Patient was more paranoid in the afternoons. She yelled at one of the NP, believing that no one likes her and we are mistreating her. Patient needed to take a prn.

## 2025-03-20 NOTE — BH INPATIENT PSYCHIATRY PROGRESS NOTE - NSBHFUPINTERVALHXFT_PSY_A_CORE
f/up psychosis, care discussed w/ tx team, vitals reviewed, , asymptomatic, RN to retake manually. seen patient in conference room with Dr. Sheffield, Patient reported that she is getting better slowly, but still not able to sit through groups yesterday. Patient reported that the voices are lessening, and heard the voices state "someone's a drug addict". Patient denied commands to harm self / others today. Patient reported fair concentration. Patient reported sleeping well/ good appetite. no td, eps or tremors observed/ reported. Patient reported that she is a  and wants to pursue law school. Reported right hand pain--reported sleeping on that side, took tylenol, instructed patient to notify if worsening.

## 2025-03-21 LAB
A1C WITH ESTIMATED AVERAGE GLUCOSE RESULT: 5 % — SIGNIFICANT CHANGE UP (ref 4–5.6)
CHOLEST SERPL-MCNC: 169 MG/DL — SIGNIFICANT CHANGE UP
ESTIMATED AVERAGE GLUCOSE: 97 — SIGNIFICANT CHANGE UP
HDLC SERPL-MCNC: 57 MG/DL — SIGNIFICANT CHANGE UP
LDLC SERPL-MCNC: 104 MG/DL — HIGH
LIPID PNL WITH DIRECT LDL SERPL: 104 MG/DL — HIGH
NONHDLC SERPL-MCNC: 112 MG/DL — SIGNIFICANT CHANGE UP
TRIGL SERPL-MCNC: 40 MG/DL — SIGNIFICANT CHANGE UP

## 2025-03-21 PROCEDURE — 99233 SBSQ HOSP IP/OBS HIGH 50: CPT

## 2025-03-21 RX ORDER — RISPERIDONE 4 MG
4 TABLET ORAL AT BEDTIME
Refills: 0 | Status: DISCONTINUED | OUTPATIENT
Start: 2025-03-21 | End: 2025-03-24

## 2025-03-21 RX ADMIN — Medication 4 MILLIGRAM(S): at 20:34

## 2025-03-21 RX ADMIN — NICOTINE POLACRILEX 4 MILLIGRAM(S): 4 GUM, CHEWING ORAL at 09:33

## 2025-03-21 RX ADMIN — NICOTINE POLACRILEX 4 MILLIGRAM(S): 4 GUM, CHEWING ORAL at 15:01

## 2025-03-21 NOTE — BH INPATIENT PSYCHIATRY PROGRESS NOTE - NSBHCHARTREVIEWVS_PSY_A_CORE FT
Vital Signs Last 24 Hrs  T(C): 36.8 (03-21-25 @ 08:03), Max: 36.8 (03-21-25 @ 08:03)  T(F): 98.2 (03-21-25 @ 08:03), Max: 98.2 (03-21-25 @ 08:03)  HR: --  BP: --  BP(mean): --  RR: --  SpO2: --    Orthostatic VS  03-21-25 @ 08:03  Lying BP: --/-- HR: --  Sitting BP: 100/70 HR: 80  Standing BP: 112/74 HR: 108  Site: --  Mode: --  Orthostatic VS  03-20-25 @ 07:18  Lying BP: --/-- HR: --  Sitting BP: 112/63 HR: 91  Standing BP: 100/65 HR: 121  Site: --  Mode: --

## 2025-03-21 NOTE — BH INPATIENT PSYCHIATRY PROGRESS NOTE - NSBHMETABOLIC_PSY_ALL_CORE_FT
BMI: BMI (kg/m2): 26 (03-14-25 @ 17:10)  HbA1c: A1C with Estimated Average Glucose Result: 5.0 % (03-21-25 @ 10:08)    Glucose:   BP: --Vital Signs Last 24 Hrs  T(C): 36.8 (03-21-25 @ 08:03), Max: 36.8 (03-21-25 @ 08:03)  T(F): 98.2 (03-21-25 @ 08:03), Max: 98.2 (03-21-25 @ 08:03)  HR: --  BP: --  BP(mean): --  RR: --  SpO2: --    Orthostatic VS  03-21-25 @ 08:03  Lying BP: --/-- HR: --  Sitting BP: 100/70 HR: 80  Standing BP: 112/74 HR: 108  Site: --  Mode: --  Orthostatic VS  03-20-25 @ 07:18  Lying BP: --/-- HR: --  Sitting BP: 112/63 HR: 91  Standing BP: 100/65 HR: 121  Site: --  Mode: --    Lipid Panel: Date/Time: 03-21-25 @ 10:08  Cholesterol, Serum: 169  LDL Cholesterol Calculated: 104  HDL Cholesterol, Serum: 57  Total Cholesterol/HDL Ration Measurement: --  Triglycerides, Serum: 40

## 2025-03-21 NOTE — BH INPATIENT PSYCHIATRY PROGRESS NOTE - NSBHFUPINTERVALHXFT_PSY_A_CORE
f/up psychosis, care discussed w/ tx team, vitals reviewed, Patient reporting CAH to harm self, was having passive SI, no I/P, took prns meds. Patient denied CAH this AM, stated that she heard a guys voice from the past, something she has not heard in a long time. Patient continues to have suspicions about staff. Patient reported sleeping well and w/ good appetite. denied active or passive SI today. no td, eps or tremors. no cogwheel rigidity. spoke to parents' during visiting hours and they were concerned. Patient did not take food from them and did not visit with them.

## 2025-03-21 NOTE — BH INPATIENT PSYCHIATRY PROGRESS NOTE - NSBHATTESTBILLING_PSY_A_CORE
87922-Vdpidqrytj OBS or IP - moderate complexity OR 35-49 mins 94321-Xrvsykcjob OBS or IP - high complexity OR 50-79 mins

## 2025-03-21 NOTE — BH INPATIENT PSYCHIATRY PROGRESS NOTE - CURRENT MEDICATION
MEDICATIONS  (STANDING):  risperiDONE   Tablet 3 milliGRAM(s) Oral at bedtime    MEDICATIONS  (PRN):  acetaminophen     Tablet .. 650 milliGRAM(s) Oral every 6 hours PRN Moderate Pain (4 - 6)  haloperidol     Tablet 5 milliGRAM(s) Oral every 8 hours PRN agitation due to psychosis  haloperidol    Injectable 5 milliGRAM(s) IntraMuscular Once PRN combative behavior  LORazepam     Tablet 2 milliGRAM(s) Oral every 8 hours PRN Anxiety  LORazepam   Injectable 2 milliGRAM(s) IntraMuscular once PRN agitation in setting of psychosis  melatonin 3 milliGRAM(s) Oral at bedtime PRN Insomnia  nicotine  Polacrilex Gum 4 milliGRAM(s) Oral every 2 hours PRN Smoking Cessation   MEDICATIONS  (STANDING):  risperiDONE  Disintegrating Tablet 4 milliGRAM(s) Oral at bedtime    MEDICATIONS  (PRN):  acetaminophen     Tablet .. 650 milliGRAM(s) Oral every 6 hours PRN Moderate Pain (4 - 6)  haloperidol     Tablet 5 milliGRAM(s) Oral every 8 hours PRN agitation due to psychosis  haloperidol    Injectable 5 milliGRAM(s) IntraMuscular Once PRN combative behavior  LORazepam     Tablet 2 milliGRAM(s) Oral every 8 hours PRN Anxiety  LORazepam   Injectable 2 milliGRAM(s) IntraMuscular once PRN agitation in setting of psychosis  melatonin 3 milliGRAM(s) Oral at bedtime PRN Insomnia  nicotine  Polacrilex Gum 4 milliGRAM(s) Oral every 2 hours PRN Smoking Cessation

## 2025-03-21 NOTE — BH INPATIENT PSYCHIATRY PROGRESS NOTE - ATTENDING COMMENTS
Care was discussed and reviewed in the interdisciplinary treatment team.  I, Gladys Meraz MD, have reviewed and verified the documentation.  I independently performed the documented medical decision making.      Patient was seen and evaluated in the group room by her self. Patient presented as guarded, irritable and internally preoccupied. Patient said that yesterday in the afternoon she felt triggered by the staff. She believes that the staff is making her life terrible because she is a terrible person. Patient then said that she did terrible things as a child but was not able to elaborate. Patient insist that the staff in the unit wants to make her life difficult. When I ask why she thinks we are doing this patient said "you know the answer to that". Continues to report AH of voices telling her to harm her self. She heard voices last night. Patient denies SI at the moment of the assessment and has been able to contract for safety.     Patient has been educated about the treatment plan and the continues to agree to take the Risperdal.     NP and this writer met with the parents. We review the reasons for the admission with them and informed the parents the patient took an OD of Benadryl before coming to the hospital with the intention of dying. Parents were surprise as the patient never informed them about this. Parents were educated about the diagnosis of Schizophrenia and the treatment for this. They have been advise to go to the [facility] website and read more about it. Parents understand the treatment and the discharge plans. They are supportive of the patient continuing her treatment in the inpatient unit and understand the patient needs to continue her treatment and maintain sobriety.

## 2025-03-22 PROCEDURE — 99233 SBSQ HOSP IP/OBS HIGH 50: CPT

## 2025-03-22 RX ADMIN — Medication 4 MILLIGRAM(S): at 20:11

## 2025-03-22 RX ADMIN — HALOPERIDOL 5 MILLIGRAM(S): 10 TABLET ORAL at 17:30

## 2025-03-22 RX ADMIN — NICOTINE POLACRILEX 4 MILLIGRAM(S): 4 GUM, CHEWING ORAL at 20:13

## 2025-03-22 RX ADMIN — NICOTINE POLACRILEX 4 MILLIGRAM(S): 4 GUM, CHEWING ORAL at 17:22

## 2025-03-22 RX ADMIN — NICOTINE POLACRILEX 4 MILLIGRAM(S): 4 GUM, CHEWING ORAL at 13:23

## 2025-03-22 NOTE — BH INPATIENT PSYCHIATRY PROGRESS NOTE - NSBHMETABOLIC_PSY_ALL_CORE_FT
BMI: BMI (kg/m2): 26.9 (03-22-25 @ 07:33)  HbA1c: A1C with Estimated Average Glucose Result: 5.0 % (03-21-25 @ 10:08)    Glucose:   BP: --Vital Signs Last 24 Hrs  T(C): 36.4 (03-22-25 @ 07:33), Max: 36.4 (03-22-25 @ 07:33)  T(F): 97.5 (03-22-25 @ 07:33), Max: 97.5 (03-22-25 @ 07:33)  HR: --  BP: --  BP(mean): --  RR: --  SpO2: --    Orthostatic VS  03-22-25 @ 07:33  Lying BP: --/-- HR: --  Sitting BP: 110/72 HR: 79  Standing BP: 104/65 HR: 91  Site: --  Mode: --  Orthostatic VS  03-21-25 @ 08:03  Lying BP: --/-- HR: --  Sitting BP: 100/70 HR: 80  Standing BP: 112/74 HR: 108  Site: --  Mode: --    Lipid Panel: Date/Time: 03-21-25 @ 10:08  Cholesterol, Serum: 169  LDL Cholesterol Calculated: 104  HDL Cholesterol, Serum: 57  Total Cholesterol/HDL Ration Measurement: --  Triglycerides, Serum: 40

## 2025-03-22 NOTE — BH INPATIENT PSYCHIATRY PROGRESS NOTE - CURRENT MEDICATION
MEDICATIONS  (STANDING):  risperiDONE  Disintegrating Tablet 4 milliGRAM(s) Oral at bedtime    MEDICATIONS  (PRN):  acetaminophen     Tablet .. 650 milliGRAM(s) Oral every 6 hours PRN Moderate Pain (4 - 6)  haloperidol     Tablet 5 milliGRAM(s) Oral every 8 hours PRN agitation due to psychosis  haloperidol    Injectable 5 milliGRAM(s) IntraMuscular Once PRN combative behavior  LORazepam     Tablet 2 milliGRAM(s) Oral every 8 hours PRN Anxiety  LORazepam   Injectable 2 milliGRAM(s) IntraMuscular once PRN agitation in setting of psychosis  melatonin 3 milliGRAM(s) Oral at bedtime PRN Insomnia  nicotine  Polacrilex Gum 4 milliGRAM(s) Oral every 2 hours PRN Smoking Cessation

## 2025-03-22 NOTE — BH INPATIENT PSYCHIATRY PROGRESS NOTE - NSBHFUPINTERVALHXFT_PSY_A_CORE
Patient is seen for psychosis, and recent SA by way of overdose.  Chart and medications reviewed. Appetite and sleep maintained. Remains compliant with medications, no SE reported (on Mtab). No behavioral issues, no prns for aggression.   Patient is seen on unit, on the pay phone.  She is calm and cooperative upon approach.  Patient reports feeling “good.”  denies any anxiety or mood dysregulation.  States she is a bit upset only because today is her birthday, and she is in the hospital.  Patient denies any current urges to self-harm.  Denies SI/SIB, no plan or intent at this time, engages in safety planning.   Pt denies AVH, delusions or beck.  Presents paranoid.  No medical concerns. Continue to monitor and provide therapeutic support.

## 2025-03-22 NOTE — BH INPATIENT PSYCHIATRY PROGRESS NOTE - NSBHCHARTREVIEWVS_PSY_A_CORE FT
Vital Signs Last 24 Hrs  T(C): 36.4 (03-22-25 @ 07:33), Max: 36.4 (03-22-25 @ 07:33)  T(F): 97.5 (03-22-25 @ 07:33), Max: 97.5 (03-22-25 @ 07:33)  HR: --  BP: --  BP(mean): --  RR: --  SpO2: --    Orthostatic VS  03-22-25 @ 07:33  Lying BP: --/-- HR: --  Sitting BP: 110/72 HR: 79  Standing BP: 104/65 HR: 91  Site: --  Mode: --  Orthostatic VS  03-21-25 @ 08:03  Lying BP: --/-- HR: --  Sitting BP: 100/70 HR: 80  Standing BP: 112/74 HR: 108  Site: --  Mode: --

## 2025-03-23 PROCEDURE — 99233 SBSQ HOSP IP/OBS HIGH 50: CPT

## 2025-03-23 RX ORDER — BISACODYL 5 MG
5 TABLET, DELAYED RELEASE (ENTERIC COATED) ORAL ONCE
Refills: 0 | Status: COMPLETED | OUTPATIENT
Start: 2025-03-23 | End: 2025-03-23

## 2025-03-23 RX ADMIN — NICOTINE POLACRILEX 4 MILLIGRAM(S): 4 GUM, CHEWING ORAL at 08:22

## 2025-03-23 RX ADMIN — NICOTINE POLACRILEX 4 MILLIGRAM(S): 4 GUM, CHEWING ORAL at 10:38

## 2025-03-23 RX ADMIN — Medication 4 MILLIGRAM(S): at 22:08

## 2025-03-23 RX ADMIN — HALOPERIDOL 5 MILLIGRAM(S): 10 TABLET ORAL at 14:43

## 2025-03-23 RX ADMIN — Medication 5 MILLIGRAM(S): at 10:03

## 2025-03-23 NOTE — BH INPATIENT PSYCHIATRY PROGRESS NOTE - CURRENT MEDICATION
MEDICATIONS  (STANDING):  bisacodyl 5 milliGRAM(s) Oral once  risperiDONE  Disintegrating Tablet 4 milliGRAM(s) Oral at bedtime    MEDICATIONS  (PRN):  acetaminophen     Tablet .. 650 milliGRAM(s) Oral every 6 hours PRN Moderate Pain (4 - 6)  haloperidol     Tablet 5 milliGRAM(s) Oral every 8 hours PRN agitation due to psychosis  haloperidol    Injectable 5 milliGRAM(s) IntraMuscular Once PRN combative behavior  LORazepam     Tablet 2 milliGRAM(s) Oral every 8 hours PRN Anxiety  LORazepam   Injectable 2 milliGRAM(s) IntraMuscular once PRN agitation in setting of psychosis  melatonin 3 milliGRAM(s) Oral at bedtime PRN Insomnia  nicotine  Polacrilex Gum 4 milliGRAM(s) Oral every 2 hours PRN Smoking Cessation

## 2025-03-23 NOTE — BH INPATIENT PSYCHIATRY PROGRESS NOTE - NSBHFUPINTERVALHXFT_PSY_A_CORE
Patient is seen for psychosis, and recent SA by way of overdose.  Chart and medications reviewed. Appetite and sleep maintained. Remains compliant with medications, no SE reported (on Mtab). No behavioral issues, no prns for aggression.   Patient is seen on unit.  She is calm and cooperative upon approach.  Patient reports feeling better this more but reports she had a rough day yesterday, reports had a good visit with her sister and parents.  denies any current anxiety or mood dysregulation. Patient denies any current urges to self-harm.  Denies SI/SIB, no plan or intent currently, engages in safety planning.   Pt denies AVH, delusions or bcek.   Reports AH “comes and goes”  No medical concerns. Continue to monitor and provide therapeutic support

## 2025-03-23 NOTE — BH INPATIENT PSYCHIATRY PROGRESS NOTE - NSBHCHARTREVIEWVS_PSY_A_CORE FT
Vital Signs Last 24 Hrs  T(C): 36.4 (03-22-25 @ 07:33), Max: 36.4 (03-22-25 @ 07:33)  T(F): 97.5 (03-22-25 @ 07:33), Max: 97.5 (03-22-25 @ 07:33)  HR: --  BP: --  BP(mean): --  RR: --  SpO2: --    Orthostatic VS  03-22-25 @ 07:33  Lying BP: --/-- HR: --  Sitting BP: 110/72 HR: 79  Standing BP: 104/65 HR: 91  Site: --  Mode: --  Orthostatic VS  03-21-25 @ 08:03  Lying BP: --/-- HR: --  Sitting BP: 100/70 HR: 80  Standing BP: 112/74 HR: 108  Site: --  Mode: --   Vital Signs Last 24 Hrs  T(C): 36.8 (03-23-25 @ 08:45), Max: 36.8 (03-23-25 @ 08:45)  T(F): 98.2 (03-23-25 @ 08:45), Max: 98.2 (03-23-25 @ 08:45)  HR: --  BP: --  BP(mean): --  RR: 18 (03-23-25 @ 08:45) (18 - 18)  SpO2: --    Orthostatic VS  03-23-25 @ 08:45  Lying BP: --/-- HR: --  Sitting BP: 100/65 HR: 115  Standing BP: 106/75 HR: 106  Site: --  Mode: --  Orthostatic VS  03-22-25 @ 07:33  Lying BP: --/-- HR: --  Sitting BP: 110/72 HR: 79  Standing BP: 104/65 HR: 91  Site: --  Mode: --

## 2025-03-23 NOTE — BH INPATIENT PSYCHIATRY PROGRESS NOTE - NSBHMETABOLIC_PSY_ALL_CORE_FT
BMI: BMI (kg/m2): 26.9 (03-22-25 @ 07:33)  HbA1c: A1C with Estimated Average Glucose Result: 5.0 % (03-21-25 @ 10:08)    Glucose:   BP: --Vital Signs Last 24 Hrs  T(C): 36.4 (03-22-25 @ 07:33), Max: 36.4 (03-22-25 @ 07:33)  T(F): 97.5 (03-22-25 @ 07:33), Max: 97.5 (03-22-25 @ 07:33)  HR: --  BP: --  BP(mean): --  RR: --  SpO2: --    Orthostatic VS  03-22-25 @ 07:33  Lying BP: --/-- HR: --  Sitting BP: 110/72 HR: 79  Standing BP: 104/65 HR: 91  Site: --  Mode: --  Orthostatic VS  03-21-25 @ 08:03  Lying BP: --/-- HR: --  Sitting BP: 100/70 HR: 80  Standing BP: 112/74 HR: 108  Site: --  Mode: --    Lipid Panel: Date/Time: 03-21-25 @ 10:08  Cholesterol, Serum: 169  LDL Cholesterol Calculated: 104  HDL Cholesterol, Serum: 57  Total Cholesterol/HDL Ration Measurement: --  Triglycerides, Serum: 40   BMI: BMI (kg/m2): 26.9 (03-22-25 @ 07:33)  HbA1c: A1C with Estimated Average Glucose Result: 5.0 % (03-21-25 @ 10:08)    Glucose:   BP: --Vital Signs Last 24 Hrs  T(C): 36.8 (03-23-25 @ 08:45), Max: 36.8 (03-23-25 @ 08:45)  T(F): 98.2 (03-23-25 @ 08:45), Max: 98.2 (03-23-25 @ 08:45)  HR: --  BP: --  BP(mean): --  RR: 18 (03-23-25 @ 08:45) (18 - 18)  SpO2: --    Orthostatic VS  03-23-25 @ 08:45  Lying BP: --/-- HR: --  Sitting BP: 100/65 HR: 115  Standing BP: 106/75 HR: 106  Site: --  Mode: --  Orthostatic VS  03-22-25 @ 07:33  Lying BP: --/-- HR: --  Sitting BP: 110/72 HR: 79  Standing BP: 104/65 HR: 91  Site: --  Mode: --    Lipid Panel: Date/Time: 03-21-25 @ 10:08  Cholesterol, Serum: 169  LDL Cholesterol Calculated: 104  HDL Cholesterol, Serum: 57  Total Cholesterol/HDL Ration Measurement: --  Triglycerides, Serum: 40

## 2025-03-24 PROCEDURE — 99232 SBSQ HOSP IP/OBS MODERATE 35: CPT

## 2025-03-24 RX ORDER — RISPERIDONE 4 MG
5 TABLET ORAL AT BEDTIME
Refills: 0 | Status: DISCONTINUED | OUTPATIENT
Start: 2025-03-24 | End: 2025-04-11

## 2025-03-24 RX ADMIN — Medication 5 MILLIGRAM(S): at 22:02

## 2025-03-24 RX ADMIN — Medication 650 MILLIGRAM(S): at 16:24

## 2025-03-24 RX ADMIN — NICOTINE POLACRILEX 4 MILLIGRAM(S): 4 GUM, CHEWING ORAL at 18:37

## 2025-03-24 RX ADMIN — HALOPERIDOL 5 MILLIGRAM(S): 10 TABLET ORAL at 16:35

## 2025-03-24 RX ADMIN — NICOTINE POLACRILEX 4 MILLIGRAM(S): 4 GUM, CHEWING ORAL at 15:34

## 2025-03-24 RX ADMIN — NICOTINE POLACRILEX 4 MILLIGRAM(S): 4 GUM, CHEWING ORAL at 12:45

## 2025-03-24 RX ADMIN — Medication 650 MILLIGRAM(S): at 17:39

## 2025-03-24 RX ADMIN — NICOTINE POLACRILEX 4 MILLIGRAM(S): 4 GUM, CHEWING ORAL at 09:13

## 2025-03-24 NOTE — BH INPATIENT PSYCHIATRY PROGRESS NOTE - NSBHFUPINTERVALHXFT_PSY_A_CORE
f/up psychosis, care discussed w/ tx team, ZAFARS. Patient reported that she overhead the nurse calling her "good boy" last night like she was "dog". Patient reported feeling upset about this and was requesting to leave. Patient reported hearing voices telling her to harm herself once this morning and last night. no intent or plan to follow the commands. Patient also hearing voices that "someone wants to have sex with me", and prior to that it was "rape". Patient denied SI/I/P. reported being to sleep all night and with good appetite. no td, eps or tremors observed/ reported. no dizziness. Patient later came to writer on the unit and stated that she heard "good boy". Patient instructed that writer did not hear that in the milieu.

## 2025-03-24 NOTE — BH INPATIENT PSYCHIATRY PROGRESS NOTE - NSBHMETABOLIC_PSY_ALL_CORE_FT
BMI: BMI (kg/m2): 26.9 (03-22-25 @ 07:33)  HbA1c: A1C with Estimated Average Glucose Result: 5.0 % (03-21-25 @ 10:08)    Glucose:   BP: --Vital Signs Last 24 Hrs  T(C): 36.7 (03-24-25 @ 07:28), Max: 36.7 (03-24-25 @ 07:28)  T(F): 98.1 (03-24-25 @ 07:28), Max: 98.1 (03-24-25 @ 07:28)  HR: --  BP: --  BP(mean): --  RR: --  SpO2: --    Orthostatic VS  03-24-25 @ 07:28  Lying BP: --/-- HR: --  Sitting BP: 114/63 HR: 70  Standing BP: 109/68 HR: 93  Site: --  Mode: --  Orthostatic VS  03-23-25 @ 08:45  Lying BP: --/-- HR: --  Sitting BP: 100/65 HR: 115  Standing BP: 106/75 HR: 106  Site: --  Mode: --    Lipid Panel: Date/Time: 03-21-25 @ 10:08  Cholesterol, Serum: 169  LDL Cholesterol Calculated: 104  HDL Cholesterol, Serum: 57  Total Cholesterol/HDL Ration Measurement: --  Triglycerides, Serum: 40

## 2025-03-24 NOTE — BH INPATIENT PSYCHIATRY PROGRESS NOTE - NSBHCHARTREVIEWVS_PSY_A_CORE FT
Vital Signs Last 24 Hrs  T(C): 36.7 (03-24-25 @ 07:28), Max: 36.7 (03-24-25 @ 07:28)  T(F): 98.1 (03-24-25 @ 07:28), Max: 98.1 (03-24-25 @ 07:28)  HR: --  BP: --  BP(mean): --  RR: --  SpO2: --    Orthostatic VS  03-24-25 @ 07:28  Lying BP: --/-- HR: --  Sitting BP: 114/63 HR: 70  Standing BP: 109/68 HR: 93  Site: --  Mode: --  Orthostatic VS  03-23-25 @ 08:45  Lying BP: --/-- HR: --  Sitting BP: 100/65 HR: 115  Standing BP: 106/75 HR: 106  Site: --  Mode: --

## 2025-03-24 NOTE — BH INPATIENT PSYCHIATRY PROGRESS NOTE - ATTENDING COMMENTS
Care was discussed and reviewed in the interdisciplinary treatment team.  I, Gladys Meraz MD, have reviewed and verified the documentation.  I independently performed the documented medical decision making.

## 2025-03-25 PROCEDURE — 99232 SBSQ HOSP IP/OBS MODERATE 35: CPT

## 2025-03-25 RX ADMIN — Medication 5 MILLIGRAM(S): at 20:39

## 2025-03-25 RX ADMIN — NICOTINE POLACRILEX 4 MILLIGRAM(S): 4 GUM, CHEWING ORAL at 09:00

## 2025-03-25 RX ADMIN — NICOTINE POLACRILEX 4 MILLIGRAM(S): 4 GUM, CHEWING ORAL at 16:59

## 2025-03-25 RX ADMIN — Medication 650 MILLIGRAM(S): at 20:42

## 2025-03-25 RX ADMIN — HALOPERIDOL 5 MILLIGRAM(S): 10 TABLET ORAL at 16:35

## 2025-03-25 RX ADMIN — Medication 2 MILLIGRAM(S): at 20:41

## 2025-03-25 RX ADMIN — Medication 650 MILLIGRAM(S): at 21:47

## 2025-03-25 NOTE — BH INPATIENT PSYCHIATRY PROGRESS NOTE - CURRENT MEDICATION
No new care gaps identified.  Powered by [employer] by [employer]. Reference number: [id number].   2/08/2022 7:55:27 AM CST   MEDICATIONS  (STANDING):  risperiDONE  Disintegrating Tablet 5 milliGRAM(s) Oral at bedtime    MEDICATIONS  (PRN):  acetaminophen     Tablet .. 650 milliGRAM(s) Oral every 6 hours PRN Moderate Pain (4 - 6)  haloperidol     Tablet 5 milliGRAM(s) Oral every 8 hours PRN agitation due to psychosis  haloperidol    Injectable 5 milliGRAM(s) IntraMuscular Once PRN combative behavior  LORazepam     Tablet 2 milliGRAM(s) Oral every 8 hours PRN Anxiety  LORazepam   Injectable 2 milliGRAM(s) IntraMuscular once PRN agitation in setting of psychosis  melatonin 3 milliGRAM(s) Oral at bedtime PRN Insomnia  nicotine  Polacrilex Gum 4 milliGRAM(s) Oral every 2 hours PRN Smoking Cessation

## 2025-03-25 NOTE — BH INPATIENT PSYCHIATRY PROGRESS NOTE - NSBHMETABOLIC_PSY_ALL_CORE_FT
BMI: BMI (kg/m2): 26.9 (03-22-25 @ 07:33)  HbA1c: A1C with Estimated Average Glucose Result: 5.0 % (03-21-25 @ 10:08)    Glucose:   BP: --Vital Signs Last 24 Hrs  T(C): 36.3 (03-25-25 @ 07:31), Max: 36.3 (03-25-25 @ 07:31)  T(F): 97.3 (03-25-25 @ 07:31), Max: 97.3 (03-25-25 @ 07:31)  HR: --  BP: --  BP(mean): --  RR: 17 (03-25-25 @ 07:31) (17 - 17)  SpO2: --    Orthostatic VS  03-25-25 @ 07:31  Lying BP: --/-- HR: --  Sitting BP: 108/61 HR: 79  Standing BP: 109/76 HR: 86  Site: --  Mode: --  Orthostatic VS  03-24-25 @ 07:28  Lying BP: --/-- HR: --  Sitting BP: 114/63 HR: 70  Standing BP: 109/68 HR: 93  Site: --  Mode: --    Lipid Panel: Date/Time: 03-21-25 @ 10:08  Cholesterol, Serum: 169  LDL Cholesterol Calculated: 104  HDL Cholesterol, Serum: 57  Total Cholesterol/HDL Ration Measurement: --  Triglycerides, Serum: 40

## 2025-03-25 NOTE — BH INPATIENT PSYCHIATRY PROGRESS NOTE - NSBHFUPINTERVALHXFT_PSY_A_CORE
f/up psychosis, care discussed w/ tx team, LETITIA. no issues overnight, took prn meds yesterday evening after reporting to writer that she was hearing "lot of voices". Patient reported decrease in voices last night and this AM. Patient reported that she does not remember what the voices were stating. Patient reported that she was having difficulty concentrating in groups and have been avoiding them. Denied SE to meds. no td, eps or tremors observed/ reported. Patient reported sleeping well and with good appetite. Appeared internally preoccupied and guarded.

## 2025-03-25 NOTE — BH INPATIENT PSYCHIATRY PROGRESS NOTE - NSBHCHARTREVIEWVS_PSY_A_CORE FT
Vital Signs Last 24 Hrs  T(C): 36.3 (03-25-25 @ 07:31), Max: 36.3 (03-25-25 @ 07:31)  T(F): 97.3 (03-25-25 @ 07:31), Max: 97.3 (03-25-25 @ 07:31)  HR: --  BP: --  BP(mean): --  RR: 17 (03-25-25 @ 07:31) (17 - 17)  SpO2: --    Orthostatic VS  03-25-25 @ 07:31  Lying BP: --/-- HR: --  Sitting BP: 108/61 HR: 79  Standing BP: 109/76 HR: 86  Site: --  Mode: --  Orthostatic VS  03-24-25 @ 07:28  Lying BP: --/-- HR: --  Sitting BP: 114/63 HR: 70  Standing BP: 109/68 HR: 93  Site: --  Mode: --

## 2025-03-26 PROCEDURE — 99232 SBSQ HOSP IP/OBS MODERATE 35: CPT

## 2025-03-26 RX ORDER — LORAZEPAM 4 MG/ML
2 VIAL (ML) INJECTION ONCE
Refills: 0 | Status: DISCONTINUED | OUTPATIENT
Start: 2025-03-26 | End: 2025-04-02

## 2025-03-26 RX ORDER — LORAZEPAM 4 MG/ML
2 VIAL (ML) INJECTION EVERY 8 HOURS
Refills: 0 | Status: DISCONTINUED | OUTPATIENT
Start: 2025-03-26 | End: 2025-04-02

## 2025-03-26 RX ADMIN — NICOTINE POLACRILEX 4 MILLIGRAM(S): 4 GUM, CHEWING ORAL at 17:16

## 2025-03-26 RX ADMIN — Medication 5 MILLIGRAM(S): at 20:27

## 2025-03-26 RX ADMIN — NICOTINE POLACRILEX 4 MILLIGRAM(S): 4 GUM, CHEWING ORAL at 20:28

## 2025-03-26 RX ADMIN — Medication 3 MILLIGRAM(S): at 22:39

## 2025-03-26 NOTE — BH INPATIENT PSYCHIATRY PROGRESS NOTE - NSBHMETABOLIC_PSY_ALL_CORE_FT
BMI: BMI (kg/m2): 26.9 (03-22-25 @ 07:33)  HbA1c: A1C with Estimated Average Glucose Result: 5.0 % (03-21-25 @ 10:08)    Glucose:   BP: --Vital Signs Last 24 Hrs  T(C): 36.6 (03-26-25 @ 07:40), Max: 36.6 (03-26-25 @ 07:40)  T(F): 97.9 (03-26-25 @ 07:40), Max: 97.9 (03-26-25 @ 07:40)  HR: --  BP: --  BP(mean): --  RR: --  SpO2: --    Orthostatic VS  03-26-25 @ 07:40  Lying BP: --/-- HR: --  Sitting BP: 111/64 HR: 78  Standing BP: 100/62 HR: 109  Site: --  Mode: --  Orthostatic VS  03-25-25 @ 07:31  Lying BP: --/-- HR: --  Sitting BP: 108/61 HR: 79  Standing BP: 109/76 HR: 86  Site: --  Mode: --    Lipid Panel: Date/Time: 03-21-25 @ 10:08  Cholesterol, Serum: 169  LDL Cholesterol Calculated: 104  HDL Cholesterol, Serum: 57  Total Cholesterol/HDL Ration Measurement: --  Triglycerides, Serum: 40

## 2025-03-26 NOTE — BH INPATIENT PSYCHIATRY PROGRESS NOTE - NSBHCHARTREVIEWVS_PSY_A_CORE FT
Vital Signs Last 24 Hrs  T(C): 36.6 (03-26-25 @ 07:40), Max: 36.6 (03-26-25 @ 07:40)  T(F): 97.9 (03-26-25 @ 07:40), Max: 97.9 (03-26-25 @ 07:40)  HR: --  BP: --  BP(mean): --  RR: --  SpO2: --    Orthostatic VS  03-26-25 @ 07:40  Lying BP: --/-- HR: --  Sitting BP: 111/64 HR: 78  Standing BP: 100/62 HR: 109  Site: --  Mode: --  Orthostatic VS  03-25-25 @ 07:31  Lying BP: --/-- HR: --  Sitting BP: 108/61 HR: 79  Standing BP: 109/76 HR: 86  Site: --  Mode: --

## 2025-03-26 NOTE — BH INPATIENT PSYCHIATRY PROGRESS NOTE - CURRENT MEDICATION
MEDICATIONS  (STANDING):  risperiDONE  Disintegrating Tablet 5 milliGRAM(s) Oral at bedtime    MEDICATIONS  (PRN):  acetaminophen     Tablet .. 650 milliGRAM(s) Oral every 6 hours PRN Moderate Pain (4 - 6)  haloperidol     Tablet 5 milliGRAM(s) Oral every 8 hours PRN agitation due to psychosis  haloperidol    Injectable 5 milliGRAM(s) IntraMuscular Once PRN combative behavior  LORazepam     Tablet 2 milliGRAM(s) Oral every 8 hours PRN Anxiety  LORazepam   Injectable 2 milliGRAM(s) IntraMuscular once PRN agitation in setting of psychosis  melatonin 3 milliGRAM(s) Oral at bedtime PRN Insomnia  nicotine  Polacrilex Gum 4 milliGRAM(s) Oral every 2 hours PRN Smoking Cessation

## 2025-03-26 NOTE — BH INPATIENT PSYCHIATRY PROGRESS NOTE - ATTENDING COMMENTS
----- Message from River Guy sent at 8/12/2019  4:05 PM EDT -----  Contact: PT  Pt says Dr. Moody put him on Oxcarbazepine 150 mg bid for facial pain, pt says he's still having pain, and wants to know if the dose can be increased.  Please call him at [phone].    ThanksPascale   Care was discussed and reviewed in the interdisciplinary treatment team.  I, Gladys Meraz MD, have reviewed and verified the documentation.  I independently performed the documented medical decision making.

## 2025-03-26 NOTE — BH INPATIENT PSYCHIATRY PROGRESS NOTE - NSBHFUPINTERVALHXFT_PSY_A_CORE
f/up psychosis, care discussed w/ tx team, VSS. no issues overnight, Patient observed sitting by herself during groups this AM writing in her journal. Patient reported that she was hearing voices stating that "cousin did something and put her on a list". Reported that it felt real to her. Patient denied dizziness or constipation. Patient reported that paranoid ideations were decreasing. Patient encouraged to work on coping skills as well--such as focusing on positive affirmation or positive attributes about herself. Patient denied SE to meds. no td, eps or tremors observed/ reported. Discussed Uzedy. denied CAH to harm self/ others today and yesterday.

## 2025-03-27 PROCEDURE — 99232 SBSQ HOSP IP/OBS MODERATE 35: CPT

## 2025-03-27 RX ADMIN — Medication 650 MILLIGRAM(S): at 17:25

## 2025-03-27 RX ADMIN — HALOPERIDOL 5 MILLIGRAM(S): 10 TABLET ORAL at 13:58

## 2025-03-27 RX ADMIN — Medication 5 MILLIGRAM(S): at 20:23

## 2025-03-27 RX ADMIN — NICOTINE POLACRILEX 4 MILLIGRAM(S): 4 GUM, CHEWING ORAL at 13:58

## 2025-03-27 RX ADMIN — NICOTINE POLACRILEX 4 MILLIGRAM(S): 4 GUM, CHEWING ORAL at 09:04

## 2025-03-27 RX ADMIN — Medication 3 MILLIGRAM(S): at 20:23

## 2025-03-27 NOTE — BH INPATIENT PSYCHIATRY PROGRESS NOTE - NSBHFUPINTERVALHXFT_PSY_A_CORE
f/up psychosis, care discussed w/ tx team, VSS. no issues overnight, Patient reported that she heard her father's voice telling her she was a "loser". Patient reported that it was other voices as well in addition to her father. Denied command auditory hallucinations. Patient reported that she has been able to sleep well, and with good appetite. Denied SE to meds. no td, eps or tremors observed/ reported. Patient reported that her sx are at a range of 5/10, with 10 being severe. Denied that staff is talking negative about her or calling her names. Observed out in the milieu, attending groups.

## 2025-03-27 NOTE — BH INPATIENT PSYCHIATRY PROGRESS NOTE - NSBHMETABOLIC_PSY_ALL_CORE_FT
BMI: BMI (kg/m2): 26.9 (03-22-25 @ 07:33)  HbA1c: A1C with Estimated Average Glucose Result: 5.0 % (03-21-25 @ 10:08)    Glucose:   BP: --Vital Signs Last 24 Hrs  T(C): 36.7 (03-27-25 @ 07:25), Max: 36.7 (03-27-25 @ 07:25)  T(F): 98.1 (03-27-25 @ 07:25), Max: 98.1 (03-27-25 @ 07:25)  HR: --  BP: --  BP(mean): --  RR: --  SpO2: --    Orthostatic VS  03-27-25 @ 07:25  Lying BP: --/-- HR: --  Sitting BP: 106/69 HR: 100  Standing BP: 101/77 HR: 112  Site: --  Mode: --  Orthostatic VS  03-26-25 @ 07:40  Lying BP: --/-- HR: --  Sitting BP: 111/64 HR: 78  Standing BP: 100/62 HR: 109  Site: --  Mode: --    Lipid Panel: Date/Time: 03-21-25 @ 10:08  Cholesterol, Serum: 169  LDL Cholesterol Calculated: 104  HDL Cholesterol, Serum: 57  Total Cholesterol/HDL Ration Measurement: --  Triglycerides, Serum: 40

## 2025-03-27 NOTE — BH INPATIENT PSYCHIATRY PROGRESS NOTE - ATTENDING COMMENTS
Care was discussed and reviewed in the interdisciplinary treatment team.  I, Gladys Meraz MD, have reviewed and verified the documentation.  I independently performed the documented medical decision making.    Patient remains guarded, quiet and isolative. She admits has continue to experience AH but this are not commanding. She is denying SI at this time and has been able to contract for safety. Patient was educated about the medications and the treatment plan. She continues to be in agreement with the hospitalization and with the use of Risperdal.

## 2025-03-27 NOTE — BH INPATIENT PSYCHIATRY PROGRESS NOTE - NSBHCHARTREVIEWVS_PSY_A_CORE FT
Vital Signs Last 24 Hrs  T(C): 36.7 (03-27-25 @ 07:25), Max: 36.7 (03-27-25 @ 07:25)  T(F): 98.1 (03-27-25 @ 07:25), Max: 98.1 (03-27-25 @ 07:25)  HR: --  BP: --  BP(mean): --  RR: --  SpO2: --    Orthostatic VS  03-27-25 @ 07:25  Lying BP: --/-- HR: --  Sitting BP: 106/69 HR: 100  Standing BP: 101/77 HR: 112  Site: --  Mode: --  Orthostatic VS  03-26-25 @ 07:40  Lying BP: --/-- HR: --  Sitting BP: 111/64 HR: 78  Standing BP: 100/62 HR: 109  Site: --  Mode: --

## 2025-03-28 PROCEDURE — 99232 SBSQ HOSP IP/OBS MODERATE 35: CPT

## 2025-03-28 RX ORDER — RISPERIDONE 4 MG
1 TABLET ORAL DAILY
Refills: 0 | Status: DISCONTINUED | OUTPATIENT
Start: 2025-03-28 | End: 2025-04-11

## 2025-03-28 RX ADMIN — Medication 5 MILLIGRAM(S): at 21:10

## 2025-03-28 RX ADMIN — HALOPERIDOL 5 MILLIGRAM(S): 10 TABLET ORAL at 11:06

## 2025-03-28 RX ADMIN — Medication 2 MILLIGRAM(S): at 10:55

## 2025-03-28 RX ADMIN — NICOTINE POLACRILEX 4 MILLIGRAM(S): 4 GUM, CHEWING ORAL at 06:33

## 2025-03-28 NOTE — BH INPATIENT PSYCHIATRY PROGRESS NOTE - NSBHCHARTREVIEWVS_PSY_A_CORE FT
Vital Signs Last 24 Hrs  T(C): 36.5 (03-28-25 @ 08:19), Max: 36.5 (03-28-25 @ 08:19)  T(F): 97.7 (03-28-25 @ 08:19), Max: 97.7 (03-28-25 @ 08:19)  HR: --  BP: --  BP(mean): --  RR: --  SpO2: --    Orthostatic VS  03-28-25 @ 08:19  Lying BP: --/-- HR: --  Sitting BP: 112/79 HR: 96  Standing BP: 105/70 HR: 95  Site: --  Mode: --  Orthostatic VS  03-27-25 @ 07:25  Lying BP: --/-- HR: --  Sitting BP: 106/69 HR: 100  Standing BP: 101/77 HR: 112  Site: --  Mode: --

## 2025-03-28 NOTE — BH INPATIENT PSYCHIATRY PROGRESS NOTE - NSBHMETABOLIC_PSY_ALL_CORE_FT
BMI: BMI (kg/m2): 26.9 (03-22-25 @ 07:33)  HbA1c: A1C with Estimated Average Glucose Result: 5.0 % (03-21-25 @ 10:08)    Glucose:   BP: --Vital Signs Last 24 Hrs  T(C): 36.5 (03-28-25 @ 08:19), Max: 36.5 (03-28-25 @ 08:19)  T(F): 97.7 (03-28-25 @ 08:19), Max: 97.7 (03-28-25 @ 08:19)  HR: --  BP: --  BP(mean): --  RR: --  SpO2: --    Orthostatic VS  03-28-25 @ 08:19  Lying BP: --/-- HR: --  Sitting BP: 112/79 HR: 96  Standing BP: 105/70 HR: 95  Site: --  Mode: --  Orthostatic VS  03-27-25 @ 07:25  Lying BP: --/-- HR: --  Sitting BP: 106/69 HR: 100  Standing BP: 101/77 HR: 112  Site: --  Mode: --    Lipid Panel: Date/Time: 03-21-25 @ 10:08  Cholesterol, Serum: 169  LDL Cholesterol Calculated: 104  HDL Cholesterol, Serum: 57  Total Cholesterol/HDL Ration Measurement: --  Triglycerides, Serum: 40

## 2025-03-28 NOTE — BH INPATIENT PSYCHIATRY PROGRESS NOTE - CURRENT MEDICATION
MEDICATIONS  (STANDING):  risperiDONE  Disintegrating Tablet 5 milliGRAM(s) Oral at bedtime    MEDICATIONS  (PRN):  acetaminophen     Tablet .. 650 milliGRAM(s) Oral every 6 hours PRN Moderate Pain (4 - 6)  haloperidol     Tablet 5 milliGRAM(s) Oral every 8 hours PRN agitation due to psychosis  haloperidol    Injectable 5 milliGRAM(s) IntraMuscular Once PRN combative behavior  LORazepam     Tablet 2 milliGRAM(s) Oral every 8 hours PRN Anxiety  LORazepam   Injectable 2 milliGRAM(s) IntraMuscular once PRN agitation in setting of psychosis  melatonin 3 milliGRAM(s) Oral at bedtime PRN Insomnia  nicotine  Polacrilex Gum 4 milliGRAM(s) Oral every 2 hours PRN Smoking Cessation   MEDICATIONS  (STANDING):  risperiDONE  Disintegrating Tablet 1 milliGRAM(s) Oral daily  risperiDONE  Disintegrating Tablet 5 milliGRAM(s) Oral at bedtime    MEDICATIONS  (PRN):  acetaminophen     Tablet .. 650 milliGRAM(s) Oral every 6 hours PRN Moderate Pain (4 - 6)  haloperidol     Tablet 5 milliGRAM(s) Oral every 8 hours PRN agitation due to psychosis  haloperidol    Injectable 5 milliGRAM(s) IntraMuscular Once PRN combative behavior  LORazepam     Tablet 2 milliGRAM(s) Oral every 8 hours PRN Anxiety  LORazepam   Injectable 2 milliGRAM(s) IntraMuscular once PRN agitation in setting of psychosis  melatonin 3 milliGRAM(s) Oral at bedtime PRN Insomnia  nicotine  Polacrilex Gum 4 milliGRAM(s) Oral every 2 hours PRN Smoking Cessation

## 2025-03-28 NOTE — BH INPATIENT PSYCHIATRY PROGRESS NOTE - NSBHFUPINTERVALHXFT_PSY_A_CORE
f/up psychosis, care discussed w/ tx team, VSS. no issues overnight, Patient reported that she is hearing voices of her co-workers and employers being mean to her. Patient also heard voices of a boy that she used to like and voices stating that he is a . Patient denied command voices. Patient took prn meds this AM due to the voices. Patient reported being able to sleep well and with good appetite. Patient denied SE to meds. no td, eps or tremors observed/ reported. Patient visible in the milieu. When asking about paranoia--patient hesitated before stating "no".

## 2025-03-29 RX ADMIN — Medication 3 MILLIGRAM(S): at 20:28

## 2025-03-29 RX ADMIN — Medication 1 MILLIGRAM(S): at 08:33

## 2025-03-29 RX ADMIN — NICOTINE POLACRILEX 4 MILLIGRAM(S): 4 GUM, CHEWING ORAL at 16:58

## 2025-03-29 RX ADMIN — NICOTINE POLACRILEX 4 MILLIGRAM(S): 4 GUM, CHEWING ORAL at 13:04

## 2025-03-29 RX ADMIN — Medication 2 MILLIGRAM(S): at 16:28

## 2025-03-29 RX ADMIN — Medication 5 MILLIGRAM(S): at 20:28

## 2025-03-30 RX ADMIN — Medication 2 MILLIGRAM(S): at 15:10

## 2025-03-30 RX ADMIN — Medication 1 MILLIGRAM(S): at 09:39

## 2025-03-30 RX ADMIN — NICOTINE POLACRILEX 4 MILLIGRAM(S): 4 GUM, CHEWING ORAL at 21:30

## 2025-03-30 RX ADMIN — Medication 5 MILLIGRAM(S): at 21:20

## 2025-03-30 RX ADMIN — NICOTINE POLACRILEX 4 MILLIGRAM(S): 4 GUM, CHEWING ORAL at 15:10

## 2025-03-30 RX ADMIN — NICOTINE POLACRILEX 4 MILLIGRAM(S): 4 GUM, CHEWING ORAL at 09:39

## 2025-03-30 RX ADMIN — Medication 3 MILLIGRAM(S): at 21:30

## 2025-03-31 PROCEDURE — 99232 SBSQ HOSP IP/OBS MODERATE 35: CPT

## 2025-03-31 RX ADMIN — Medication 1 MILLIGRAM(S): at 08:33

## 2025-03-31 RX ADMIN — NICOTINE POLACRILEX 4 MILLIGRAM(S): 4 GUM, CHEWING ORAL at 17:44

## 2025-03-31 RX ADMIN — NICOTINE POLACRILEX 4 MILLIGRAM(S): 4 GUM, CHEWING ORAL at 10:34

## 2025-03-31 RX ADMIN — Medication 3 MILLIGRAM(S): at 20:40

## 2025-03-31 RX ADMIN — Medication 5 MILLIGRAM(S): at 20:39

## 2025-03-31 NOTE — BH INPATIENT PSYCHIATRY PROGRESS NOTE - NSBHFUPINTERVALHXFT_PSY_A_CORE
f/up psychosis, care discussed w/ tx team, VSS. no issues overnight, Patient reported that she has not been hearing voices for the past 2 days, feeling "lot better", able to concentrate in groups, observed sitting by herself in TV area during lunch. Patient reported taking prn ativan on saturday for anxiety. Patient declining PHP, stating that she wants to return back to work. Patient reported fair sleep and appetite. denied SE to meds, no td, eps or tremors observed/ reported.

## 2025-03-31 NOTE — BH INPATIENT PSYCHIATRY PROGRESS NOTE - NSBHCHARTREVIEWVS_PSY_A_CORE FT
Vital Signs Last 24 Hrs  T(C): 36.8 (03-31-25 @ 08:08), Max: 36.8 (03-31-25 @ 08:08)  T(F): 98.2 (03-31-25 @ 08:08), Max: 98.2 (03-31-25 @ 08:08)  HR: --  BP: --  BP(mean): --  RR: --  SpO2: --    Orthostatic VS  03-31-25 @ 08:08  Lying BP: --/-- HR: --  Sitting BP: 102/66 HR: 75  Standing BP: 102/61 HR: 99  Site: --  Mode: --  Orthostatic VS  03-30-25 @ 08:54  Lying BP: --/-- HR: --  Sitting BP: 110/69 HR: 84  Standing BP: 108/71 HR: 98  Site: --  Mode: --

## 2025-03-31 NOTE — BH INPATIENT PSYCHIATRY PROGRESS NOTE - CURRENT MEDICATION
MEDICATIONS  (STANDING):  risperiDONE  Disintegrating Tablet 1 milliGRAM(s) Oral daily  risperiDONE  Disintegrating Tablet 5 milliGRAM(s) Oral at bedtime    MEDICATIONS  (PRN):  acetaminophen     Tablet .. 650 milliGRAM(s) Oral every 6 hours PRN Moderate Pain (4 - 6)  haloperidol     Tablet 5 milliGRAM(s) Oral every 8 hours PRN agitation due to psychosis  haloperidol    Injectable 5 milliGRAM(s) IntraMuscular Once PRN combative behavior  LORazepam     Tablet 2 milliGRAM(s) Oral every 8 hours PRN Anxiety  LORazepam   Injectable 2 milliGRAM(s) IntraMuscular once PRN agitation in setting of psychosis  melatonin 3 milliGRAM(s) Oral at bedtime PRN Insomnia  nicotine  Polacrilex Gum 4 milliGRAM(s) Oral every 2 hours PRN Smoking Cessation

## 2025-03-31 NOTE — BH INPATIENT PSYCHIATRY PROGRESS NOTE - NSBHMETABOLIC_PSY_ALL_CORE_FT
BMI: BMI (kg/m2): 26.9 (03-22-25 @ 07:33)  HbA1c: A1C with Estimated Average Glucose Result: 5.0 % (03-21-25 @ 10:08)    Glucose:   BP: --Vital Signs Last 24 Hrs  T(C): 36.8 (03-31-25 @ 08:08), Max: 36.8 (03-31-25 @ 08:08)  T(F): 98.2 (03-31-25 @ 08:08), Max: 98.2 (03-31-25 @ 08:08)  HR: --  BP: --  BP(mean): --  RR: --  SpO2: --    Orthostatic VS  03-31-25 @ 08:08  Lying BP: --/-- HR: --  Sitting BP: 102/66 HR: 75  Standing BP: 102/61 HR: 99  Site: --  Mode: --  Orthostatic VS  03-30-25 @ 08:54  Lying BP: --/-- HR: --  Sitting BP: 110/69 HR: 84  Standing BP: 108/71 HR: 98  Site: --  Mode: --    Lipid Panel: Date/Time: 03-21-25 @ 10:08  Cholesterol, Serum: 169  LDL Cholesterol Calculated: 104  HDL Cholesterol, Serum: 57  Total Cholesterol/HDL Ration Measurement: --  Triglycerides, Serum: 40

## 2025-04-01 PROCEDURE — 99232 SBSQ HOSP IP/OBS MODERATE 35: CPT

## 2025-04-01 RX ORDER — RISPERIDONE 4 MG
250 TABLET ORAL ONCE
Refills: 0 | Status: COMPLETED | OUTPATIENT
Start: 2025-04-02 | End: 2025-04-02

## 2025-04-01 RX ADMIN — Medication 3 MILLIGRAM(S): at 20:49

## 2025-04-01 RX ADMIN — Medication 1 MILLIGRAM(S): at 08:39

## 2025-04-01 RX ADMIN — Medication 2 MILLIGRAM(S): at 07:42

## 2025-04-01 RX ADMIN — NICOTINE POLACRILEX 4 MILLIGRAM(S): 4 GUM, CHEWING ORAL at 18:01

## 2025-04-01 RX ADMIN — Medication 5 MILLIGRAM(S): at 20:46

## 2025-04-01 RX ADMIN — NICOTINE POLACRILEX 4 MILLIGRAM(S): 4 GUM, CHEWING ORAL at 20:49

## 2025-04-01 NOTE — BH INPATIENT PSYCHIATRY PROGRESS NOTE - NSBHCHARTREVIEWVS_PSY_A_CORE FT
Vital Signs Last 24 Hrs  T(C): 36.4 (04-01-25 @ 07:25), Max: 36.4 (04-01-25 @ 07:25)  T(F): 97.5 (04-01-25 @ 07:25), Max: 97.5 (04-01-25 @ 07:25)  HR: --  BP: --  BP(mean): --  RR: --  SpO2: --    Orthostatic VS  04-01-25 @ 07:25  Lying BP: --/-- HR: --  Sitting BP: 109/72 HR: 63  Standing BP: 98/78 HR: 97  Site: --  Mode: --  Orthostatic VS  03-31-25 @ 08:08  Lying BP: --/-- HR: --  Sitting BP: 102/66 HR: 75  Standing BP: 102/61 HR: 99  Site: --  Mode: --

## 2025-04-01 NOTE — BH INPATIENT PSYCHIATRY PROGRESS NOTE - NSBHFUPINTERVALHXFT_PSY_A_CORE
f/up psychosis, care discussed w/ tx team, LETITIA. no issues overnight, observed sitting in the milieu coloring with peers, took prn ativan this AM due to nightmares. Patient declined meditation group this AM. Patient was instructed that she will be getting Uzedy IM 250mg tomorrow. Patient reported no issues with sleep or appetite. Patient concerned about aftercare program. Patient denied SI/I/P. Patient denied dizziness or constipation, no td, eps or tremors observed/ reported.

## 2025-04-01 NOTE — BH INPATIENT PSYCHIATRY PROGRESS NOTE - NSBHMETABOLIC_PSY_ALL_CORE_FT
BMI: BMI (kg/m2): 26.9 (03-22-25 @ 07:33)  HbA1c: A1C with Estimated Average Glucose Result: 5.0 % (03-21-25 @ 10:08)    Glucose:   BP: --Vital Signs Last 24 Hrs  T(C): 36.4 (04-01-25 @ 07:25), Max: 36.4 (04-01-25 @ 07:25)  T(F): 97.5 (04-01-25 @ 07:25), Max: 97.5 (04-01-25 @ 07:25)  HR: --  BP: --  BP(mean): --  RR: --  SpO2: --    Orthostatic VS  04-01-25 @ 07:25  Lying BP: --/-- HR: --  Sitting BP: 109/72 HR: 63  Standing BP: 98/78 HR: 97  Site: --  Mode: --  Orthostatic VS  03-31-25 @ 08:08  Lying BP: --/-- HR: --  Sitting BP: 102/66 HR: 75  Standing BP: 102/61 HR: 99  Site: --  Mode: --    Lipid Panel: Date/Time: 03-21-25 @ 10:08  Cholesterol, Serum: 169  LDL Cholesterol Calculated: 104  HDL Cholesterol, Serum: 57  Total Cholesterol/HDL Ration Measurement: --  Triglycerides, Serum: 40

## 2025-04-02 PROCEDURE — 99232 SBSQ HOSP IP/OBS MODERATE 35: CPT

## 2025-04-02 RX ORDER — HYDROXYZINE HYDROCHLORIDE 25 MG/1
50 TABLET, FILM COATED ORAL EVERY 8 HOURS
Refills: 0 | Status: DISCONTINUED | OUTPATIENT
Start: 2025-04-02 | End: 2025-04-11

## 2025-04-02 RX ORDER — LORAZEPAM 4 MG/ML
1 VIAL (ML) INJECTION EVERY 8 HOURS
Refills: 0 | Status: DISCONTINUED | OUTPATIENT
Start: 2025-04-02 | End: 2025-04-09

## 2025-04-02 RX ORDER — LORAZEPAM 4 MG/ML
2 VIAL (ML) INJECTION ONCE
Refills: 0 | Status: DISCONTINUED | OUTPATIENT
Start: 2025-04-02 | End: 2025-04-09

## 2025-04-02 RX ADMIN — Medication 1 MILLIGRAM(S): at 08:21

## 2025-04-02 RX ADMIN — Medication 650 MILLIGRAM(S): at 18:16

## 2025-04-02 RX ADMIN — NICOTINE POLACRILEX 4 MILLIGRAM(S): 4 GUM, CHEWING ORAL at 17:19

## 2025-04-02 RX ADMIN — Medication 1 MILLIGRAM(S): at 19:47

## 2025-04-02 RX ADMIN — HYDROXYZINE HYDROCHLORIDE 50 MILLIGRAM(S): 25 TABLET, FILM COATED ORAL at 14:06

## 2025-04-02 RX ADMIN — Medication 250 MILLIGRAM(S): at 13:25

## 2025-04-02 RX ADMIN — NICOTINE POLACRILEX 4 MILLIGRAM(S): 4 GUM, CHEWING ORAL at 14:05

## 2025-04-02 RX ADMIN — Medication 650 MILLIGRAM(S): at 19:16

## 2025-04-02 NOTE — BH INPATIENT PSYCHIATRY PROGRESS NOTE - NSBHFUPINTERVALHXFT_PSY_A_CORE
f/up psychosis, care discussed w/ tx team, VSS. no issues overnight, Patient denied SI/I/P. Patient denied dizziness or constipation, no td, eps or tremors observed/ reported. Patient was irritable today, reported that she did not want to go to [facility], stated that she was here due to co-worker making fun of her and having low esteem which led to OD. Patient reported that she felt that the co-workers were really making fun of her and did poorly when writer attempted to reality test. Patient also insisting that she was argumentative with her mother last time she had to attend [facility]. Patient denied AH, however appeared blunted and IP. Patient keeping to self today.

## 2025-04-02 NOTE — BH INPATIENT PSYCHIATRY PROGRESS NOTE - NSBHMETABOLIC_PSY_ALL_CORE_FT
BMI: BMI (kg/m2): 26.9 (03-22-25 @ 07:33)  HbA1c: A1C with Estimated Average Glucose Result: 5.0 % (03-21-25 @ 10:08)    Glucose:   BP: --Vital Signs Last 24 Hrs  T(C): 36.8 (04-02-25 @ 07:35), Max: 36.8 (04-02-25 @ 07:35)  T(F): 98.2 (04-02-25 @ 07:35), Max: 98.2 (04-02-25 @ 07:35)  HR: --  BP: --  BP(mean): --  RR: --  SpO2: --    Orthostatic VS  04-02-25 @ 07:35  Lying BP: --/-- HR: --  Sitting BP: 98/68 HR: 80  Standing BP: 97/65 HR: 95  Site: --  Mode: --  Orthostatic VS  04-01-25 @ 07:25  Lying BP: --/-- HR: --  Sitting BP: 109/72 HR: 63  Standing BP: 98/78 HR: 97  Site: --  Mode: --    Lipid Panel: Date/Time: 03-21-25 @ 10:08  Cholesterol, Serum: 169  LDL Cholesterol Calculated: 104  HDL Cholesterol, Serum: 57  Total Cholesterol/HDL Ration Measurement: --  Triglycerides, Serum: 40

## 2025-04-02 NOTE — BH INPATIENT PSYCHIATRY PROGRESS NOTE - CURRENT MEDICATION
MEDICATIONS  (STANDING):  risperiDONE  Disintegrating Tablet 5 milliGRAM(s) Oral at bedtime  risperiDONE  Disintegrating Tablet 1 milliGRAM(s) Oral daily    MEDICATIONS  (PRN):  acetaminophen     Tablet .. 650 milliGRAM(s) Oral every 6 hours PRN Moderate Pain (4 - 6)  haloperidol     Tablet 5 milliGRAM(s) Oral every 8 hours PRN agitation due to psychosis  haloperidol    Injectable 5 milliGRAM(s) IntraMuscular Once PRN combative behavior  hydrOXYzine hydrochloride 50 milliGRAM(s) Oral every 8 hours PRN anxiety  LORazepam     Tablet 1 milliGRAM(s) Oral every 8 hours PRN Anxiety  LORazepam   Injectable 2 milliGRAM(s) IntraMuscular once PRN agitation in setting of psychosis  melatonin 3 milliGRAM(s) Oral at bedtime PRN Insomnia  nicotine  Polacrilex Gum 4 milliGRAM(s) Oral every 2 hours PRN Smoking Cessation

## 2025-04-02 NOTE — DIETITIAN INITIAL EVALUATION ADULT - PERSON TAUGHT/METHOD
healthy eating, weight management and physical activity./verbal instruction/written material/patient instructed

## 2025-04-02 NOTE — DIETITIAN INITIAL EVALUATION ADULT - PERTINENT MEDS FT
MEDICATIONS  (STANDING):  risperiDONE  Disintegrating Tablet 5 milliGRAM(s) Oral at bedtime  risperiDONE  Disintegrating Tablet 1 milliGRAM(s) Oral daily  risperiDONE (UZEDY) Extended-Release Injectable 250 milliGRAM(s) SubCutaneous once    MEDICATIONS  (PRN):  acetaminophen     Tablet .. 650 milliGRAM(s) Oral every 6 hours PRN Moderate Pain (4 - 6)  haloperidol     Tablet 5 milliGRAM(s) Oral every 8 hours PRN agitation due to psychosis  haloperidol    Injectable 5 milliGRAM(s) IntraMuscular Once PRN combative behavior  hydrOXYzine hydrochloride 50 milliGRAM(s) Oral every 8 hours PRN anxiety  LORazepam     Tablet 1 milliGRAM(s) Oral every 8 hours PRN Anxiety  LORazepam   Injectable 2 milliGRAM(s) IntraMuscular once PRN agitation in setting of psychosis  melatonin 3 milliGRAM(s) Oral at bedtime PRN Insomnia  nicotine  Polacrilex Gum 4 milliGRAM(s) Oral every 2 hours PRN Smoking Cessation

## 2025-04-02 NOTE — DIETITIAN INITIAL EVALUATION ADULT - OTHER INFO
Pt is a 24 y/o female with a history of psychosis BIB mother for a medication refill due to psychotic symptoms, having command auditory hallucinations. PMHx: Eczema. Pt admitted to [address] for Psychosis.  Met Pt in her room. reports good appetite/po intake at present. No GI distress noted. Denies food allergies. Follows a regular diet.   Current weight: 3/29 160.4 lbs (72.8 kg), 3/12 154 lbs (69.9 kg) Noted weight gain of 6 lbs within 3 weeks.   Provided verbal and written education re: healthy eating, weight management and physical activity. Pt verbalized understanding.

## 2025-04-02 NOTE — BH INPATIENT PSYCHIATRY PROGRESS NOTE - NSBHCHARTREVIEWVS_PSY_A_CORE FT
Vital Signs Last 24 Hrs  T(C): 36.8 (04-02-25 @ 07:35), Max: 36.8 (04-02-25 @ 07:35)  T(F): 98.2 (04-02-25 @ 07:35), Max: 98.2 (04-02-25 @ 07:35)  HR: --  BP: --  BP(mean): --  RR: --  SpO2: --    Orthostatic VS  04-02-25 @ 07:35  Lying BP: --/-- HR: --  Sitting BP: 98/68 HR: 80  Standing BP: 97/65 HR: 95  Site: --  Mode: --  Orthostatic VS  04-01-25 @ 07:25  Lying BP: --/-- HR: --  Sitting BP: 109/72 HR: 63  Standing BP: 98/78 HR: 97  Site: --  Mode: --

## 2025-04-03 PROCEDURE — 99232 SBSQ HOSP IP/OBS MODERATE 35: CPT

## 2025-04-03 RX ORDER — ARIPIPRAZOLE 2 MG/1
5 TABLET ORAL DAILY
Refills: 0 | Status: DISCONTINUED | OUTPATIENT
Start: 2025-04-04 | End: 2025-04-07

## 2025-04-03 RX ADMIN — NICOTINE POLACRILEX 4 MILLIGRAM(S): 4 GUM, CHEWING ORAL at 09:40

## 2025-04-03 RX ADMIN — Medication 1 MILLIGRAM(S): at 09:25

## 2025-04-03 RX ADMIN — HYDROXYZINE HYDROCHLORIDE 50 MILLIGRAM(S): 25 TABLET, FILM COATED ORAL at 09:53

## 2025-04-03 RX ADMIN — Medication 5 MILLIGRAM(S): at 21:28

## 2025-04-03 NOTE — BH INPATIENT PSYCHIATRY PROGRESS NOTE - NSBHFUPINTERVALHXFT_PSY_A_CORE
f/up psychosis, care discussed w/ tx team, [facility]. no issues overnight, received prn during visiting hours yesterday as she was delusional, thought mother put her on the sex offender list. Patient reported that she was agitated yesterday due to her mother bringing in the wrong items from home. Patient also received prns this AM for anxiety, told RN that she was hearing voices. Patient reported that she was anxious about her future. Patient denied SI/I/P. Did not attend group this AM. Patient in agreement to add Abilify to her regimen, stated that she had partial effect with Abilify in the past. denied SE from meds. no td, eps or tremors reported/ observed.

## 2025-04-03 NOTE — BH INPATIENT PSYCHIATRY PROGRESS NOTE - ATTENDING COMMENTS
I tried to met with the patient today, she was sleeping and unable to participate in the assessment.

## 2025-04-03 NOTE — BH INPATIENT PSYCHIATRY PROGRESS NOTE - NSBHCHARTREVIEWVS_PSY_A_CORE FT
Vital Signs Last 24 Hrs  T(C): 36.5 (04-03-25 @ 07:42), Max: 36.5 (04-03-25 @ 07:42)  T(F): 97.7 (04-03-25 @ 07:42), Max: 97.7 (04-03-25 @ 07:42)  HR: --  BP: --  BP(mean): --  RR: 18 (04-03-25 @ 07:42) (18 - 18)  SpO2: --    Orthostatic VS  04-03-25 @ 07:42  Lying BP: --/-- HR: --  Sitting BP: 103/67 HR: 61  Standing BP: 99/66 HR: 75  Site: --  Mode: --  Orthostatic VS  04-02-25 @ 07:35  Lying BP: --/-- HR: --  Sitting BP: 98/68 HR: 80  Standing BP: 97/65 HR: 95  Site: --  Mode: --

## 2025-04-03 NOTE — BH INPATIENT PSYCHIATRY PROGRESS NOTE - NSBHMETABOLIC_PSY_ALL_CORE_FT
BMI: BMI (kg/m2): 26.9 (03-22-25 @ 07:33)  HbA1c: A1C with Estimated Average Glucose Result: 5.0 % (03-21-25 @ 10:08)    Glucose:   BP: --Vital Signs Last 24 Hrs  T(C): 36.5 (04-03-25 @ 07:42), Max: 36.5 (04-03-25 @ 07:42)  T(F): 97.7 (04-03-25 @ 07:42), Max: 97.7 (04-03-25 @ 07:42)  HR: --  BP: --  BP(mean): --  RR: 18 (04-03-25 @ 07:42) (18 - 18)  SpO2: --    Orthostatic VS  04-03-25 @ 07:42  Lying BP: --/-- HR: --  Sitting BP: 103/67 HR: 61  Standing BP: 99/66 HR: 75  Site: --  Mode: --  Orthostatic VS  04-02-25 @ 07:35  Lying BP: --/-- HR: --  Sitting BP: 98/68 HR: 80  Standing BP: 97/65 HR: 95  Site: --  Mode: --    Lipid Panel: Date/Time: 03-21-25 @ 10:08  Cholesterol, Serum: 169  LDL Cholesterol Calculated: 104  HDL Cholesterol, Serum: 57  Total Cholesterol/HDL Ration Measurement: --  Triglycerides, Serum: 40

## 2025-04-04 PROCEDURE — 99232 SBSQ HOSP IP/OBS MODERATE 35: CPT

## 2025-04-04 RX ADMIN — HYDROXYZINE HYDROCHLORIDE 50 MILLIGRAM(S): 25 TABLET, FILM COATED ORAL at 14:33

## 2025-04-04 RX ADMIN — NICOTINE POLACRILEX 4 MILLIGRAM(S): 4 GUM, CHEWING ORAL at 08:56

## 2025-04-04 RX ADMIN — NICOTINE POLACRILEX 4 MILLIGRAM(S): 4 GUM, CHEWING ORAL at 14:33

## 2025-04-04 RX ADMIN — Medication 5 MILLIGRAM(S): at 21:35

## 2025-04-04 RX ADMIN — Medication 1 MILLIGRAM(S): at 08:23

## 2025-04-04 RX ADMIN — ARIPIPRAZOLE 5 MILLIGRAM(S): 2 TABLET ORAL at 08:24

## 2025-04-04 NOTE — BH INPATIENT PSYCHIATRY PROGRESS NOTE - CURRENT MEDICATION
MEDICATIONS  (STANDING):  ARIPiprazole 5 milliGRAM(s) Oral daily  risperiDONE  Disintegrating Tablet 5 milliGRAM(s) Oral at bedtime  risperiDONE  Disintegrating Tablet 1 milliGRAM(s) Oral daily    MEDICATIONS  (PRN):  acetaminophen     Tablet .. 650 milliGRAM(s) Oral every 6 hours PRN Moderate Pain (4 - 6)  haloperidol     Tablet 5 milliGRAM(s) Oral every 8 hours PRN agitation due to psychosis  haloperidol    Injectable 5 milliGRAM(s) IntraMuscular Once PRN combative behavior  hydrOXYzine hydrochloride 50 milliGRAM(s) Oral every 8 hours PRN anxiety  LORazepam     Tablet 1 milliGRAM(s) Oral every 8 hours PRN Anxiety  LORazepam   Injectable 2 milliGRAM(s) IntraMuscular once PRN agitation in setting of psychosis  melatonin 3 milliGRAM(s) Oral at bedtime PRN Insomnia  nicotine  Polacrilex Gum 4 milliGRAM(s) Oral every 2 hours PRN Smoking Cessation

## 2025-04-04 NOTE — BH INPATIENT PSYCHIATRY PROGRESS NOTE - NSBHFUPINTERVALHXFT_PSY_A_CORE
f/up psychosis, care discussed w/ tx team, LETITIA. As per nursing staff patient has continue to say that her mother put her in the sex offender list. Patient submitted a 3DL today at 9:30AM. I met with the patient in the group room by her self. Patient reported that she put the 3DL as she is feeling better and feels that she can be in the community. Patient informed me that she doesn't want to spend another week here. I discuss with the patient that we are still working on getting her outpatient treatment and 72 hours is not enough as most of it falls in the middle of the weekend. Patient verbalized good understanding and decided to retract the letter. At the time of the assessment patient remains suspicious. Denies SI, HI or hallucinations. She is paranoid but redirectable.

## 2025-04-04 NOTE — BH INPATIENT PSYCHIATRY PROGRESS NOTE - NSBHMETABOLIC_PSY_ALL_CORE_FT
BMI: BMI (kg/m2): 26.9 (03-22-25 @ 07:33)  HbA1c: A1C with Estimated Average Glucose Result: 5.0 % (03-21-25 @ 10:08)    Glucose:   BP: --Vital Signs Last 24 Hrs  T(C): 36.9 (04-04-25 @ 07:48), Max: 36.9 (04-04-25 @ 07:48)  T(F): 98.4 (04-04-25 @ 07:48), Max: 98.4 (04-04-25 @ 07:48)  HR: --  BP: --  BP(mean): --  RR: 17 (04-04-25 @ 07:48) (17 - 17)  SpO2: --    Orthostatic VS  04-04-25 @ 07:48  Lying BP: --/-- HR: --  Sitting BP: 102/61 HR: 84  Standing BP: 95/60 HR: 90  Site: --  Mode: --  Orthostatic VS  04-03-25 @ 07:42  Lying BP: --/-- HR: --  Sitting BP: 103/67 HR: 61  Standing BP: 99/66 HR: 75  Site: --  Mode: --    Lipid Panel: Date/Time: 03-21-25 @ 10:08  Cholesterol, Serum: 169  LDL Cholesterol Calculated: 104  HDL Cholesterol, Serum: 57  Total Cholesterol/HDL Ration Measurement: --  Triglycerides, Serum: 40

## 2025-04-04 NOTE — BH INPATIENT PSYCHIATRY PROGRESS NOTE - NSBHCHARTREVIEWVS_PSY_A_CORE FT
Vital Signs Last 24 Hrs  T(C): 36.9 (04-04-25 @ 07:48), Max: 36.9 (04-04-25 @ 07:48)  T(F): 98.4 (04-04-25 @ 07:48), Max: 98.4 (04-04-25 @ 07:48)  HR: --  BP: --  BP(mean): --  RR: 17 (04-04-25 @ 07:48) (17 - 17)  SpO2: --    Orthostatic VS  04-04-25 @ 07:48  Lying BP: --/-- HR: --  Sitting BP: 102/61 HR: 84  Standing BP: 95/60 HR: 90  Site: --  Mode: --  Orthostatic VS  04-03-25 @ 07:42  Lying BP: --/-- HR: --  Sitting BP: 103/67 HR: 61  Standing BP: 99/66 HR: 75  Site: --  Mode: --

## 2025-04-05 RX ADMIN — Medication 1 MILLIGRAM(S): at 08:22

## 2025-04-05 RX ADMIN — HYDROXYZINE HYDROCHLORIDE 50 MILLIGRAM(S): 25 TABLET, FILM COATED ORAL at 09:17

## 2025-04-05 RX ADMIN — Medication 5 MILLIGRAM(S): at 20:21

## 2025-04-05 RX ADMIN — HALOPERIDOL 5 MILLIGRAM(S): 10 TABLET ORAL at 09:17

## 2025-04-05 RX ADMIN — NICOTINE POLACRILEX 4 MILLIGRAM(S): 4 GUM, CHEWING ORAL at 08:42

## 2025-04-05 RX ADMIN — Medication 650 MILLIGRAM(S): at 17:01

## 2025-04-05 RX ADMIN — ARIPIPRAZOLE 5 MILLIGRAM(S): 2 TABLET ORAL at 08:22

## 2025-04-06 RX ADMIN — Medication 5 MILLIGRAM(S): at 20:22

## 2025-04-06 RX ADMIN — NICOTINE POLACRILEX 4 MILLIGRAM(S): 4 GUM, CHEWING ORAL at 08:24

## 2025-04-06 RX ADMIN — Medication 3 MILLIGRAM(S): at 20:23

## 2025-04-06 RX ADMIN — ARIPIPRAZOLE 5 MILLIGRAM(S): 2 TABLET ORAL at 08:23

## 2025-04-06 RX ADMIN — Medication 1 MILLIGRAM(S): at 08:23

## 2025-04-07 PROCEDURE — 99232 SBSQ HOSP IP/OBS MODERATE 35: CPT

## 2025-04-07 RX ORDER — ARIPIPRAZOLE 2 MG/1
10 TABLET ORAL DAILY
Refills: 0 | Status: DISCONTINUED | OUTPATIENT
Start: 2025-04-08 | End: 2025-04-11

## 2025-04-07 RX ADMIN — HYDROXYZINE HYDROCHLORIDE 50 MILLIGRAM(S): 25 TABLET, FILM COATED ORAL at 13:35

## 2025-04-07 RX ADMIN — Medication 1 MILLIGRAM(S): at 09:26

## 2025-04-07 RX ADMIN — ARIPIPRAZOLE 5 MILLIGRAM(S): 2 TABLET ORAL at 09:27

## 2025-04-07 RX ADMIN — NICOTINE POLACRILEX 4 MILLIGRAM(S): 4 GUM, CHEWING ORAL at 09:27

## 2025-04-07 RX ADMIN — NICOTINE POLACRILEX 4 MILLIGRAM(S): 4 GUM, CHEWING ORAL at 13:35

## 2025-04-07 RX ADMIN — Medication 5 MILLIGRAM(S): at 21:11

## 2025-04-07 NOTE — BH INPATIENT PSYCHIATRY PROGRESS NOTE - NSBHCHARTREVIEWVS_PSY_A_CORE FT
Vital Signs Last 24 Hrs  T(C): 37 (04-07-25 @ 08:44), Max: 37 (04-07-25 @ 08:44)  T(F): 98.6 (04-07-25 @ 08:44), Max: 98.6 (04-07-25 @ 08:44)  HR: --  BP: --  BP(mean): --  RR: --  SpO2: --    Orthostatic VS  04-07-25 @ 08:44  Lying BP: --/-- HR: --  Sitting BP: 103/67 HR: 96  Standing BP: --/-- HR: --  Site: --  Mode: --  Orthostatic VS  04-06-25 @ 08:45  Lying BP: --/-- HR: --  Sitting BP: 108/65 HR: 100  Standing BP: 104/70 HR: 102  Site: --  Mode: --

## 2025-04-07 NOTE — BH INPATIENT PSYCHIATRY PROGRESS NOTE - NSBHMETABOLIC_PSY_ALL_CORE_FT
BMI: BMI (kg/m2): 61.6 (04-05-25 @ 08:39)  HbA1c: A1C with Estimated Average Glucose Result: 5.0 % (03-21-25 @ 10:08)    Glucose:   BP: --Vital Signs Last 24 Hrs  T(C): 37 (04-07-25 @ 08:44), Max: 37 (04-07-25 @ 08:44)  T(F): 98.6 (04-07-25 @ 08:44), Max: 98.6 (04-07-25 @ 08:44)  HR: --  BP: --  BP(mean): --  RR: --  SpO2: --    Orthostatic VS  04-07-25 @ 08:44  Lying BP: --/-- HR: --  Sitting BP: 103/67 HR: 96  Standing BP: --/-- HR: --  Site: --  Mode: --  Orthostatic VS  04-06-25 @ 08:45  Lying BP: --/-- HR: --  Sitting BP: 108/65 HR: 100  Standing BP: 104/70 HR: 102  Site: --  Mode: --    Lipid Panel: Date/Time: 03-21-25 @ 10:08  Cholesterol, Serum: 169  LDL Cholesterol Calculated: 104  HDL Cholesterol, Serum: 57  Total Cholesterol/HDL Ration Measurement: --  Triglycerides, Serum: 40   BMI: BMI (kg/m2): 28 (04-05-25 @ 08:39)  HbA1c: A1C with Estimated Average Glucose Result: 5.0 % (03-21-25 @ 10:08)    Glucose:   BP: --Vital Signs Last 24 Hrs  T(C): 37 (04-07-25 @ 08:44), Max: 37 (04-07-25 @ 08:44)  T(F): 98.6 (04-07-25 @ 08:44), Max: 98.6 (04-07-25 @ 08:44)  HR: --  BP: --  BP(mean): --  RR: --  SpO2: --    Orthostatic VS  04-07-25 @ 08:44  Lying BP: --/-- HR: --  Sitting BP: 103/67 HR: 96  Standing BP: --/-- HR: --  Site: --  Mode: --  Orthostatic VS  04-06-25 @ 08:45  Lying BP: --/-- HR: --  Sitting BP: 108/65 HR: 100  Standing BP: 104/70 HR: 102  Site: --  Mode: --    Lipid Panel: Date/Time: 03-21-25 @ 10:08  Cholesterol, Serum: 169  LDL Cholesterol Calculated: 104  HDL Cholesterol, Serum: 57  Total Cholesterol/HDL Ration Measurement: --  Triglycerides, Serum: 40

## 2025-04-07 NOTE — BH INPATIENT PSYCHIATRY PROGRESS NOTE - CURRENT MEDICATION
MEDICATIONS  (STANDING):  risperiDONE  Disintegrating Tablet 1 milliGRAM(s) Oral daily  risperiDONE  Disintegrating Tablet 5 milliGRAM(s) Oral at bedtime    MEDICATIONS  (PRN):  acetaminophen     Tablet .. 650 milliGRAM(s) Oral every 6 hours PRN Moderate Pain (4 - 6)  haloperidol     Tablet 5 milliGRAM(s) Oral every 8 hours PRN agitation due to psychosis  haloperidol    Injectable 5 milliGRAM(s) IntraMuscular Once PRN combative behavior  hydrOXYzine hydrochloride 50 milliGRAM(s) Oral every 8 hours PRN anxiety  LORazepam     Tablet 1 milliGRAM(s) Oral every 8 hours PRN Anxiety  LORazepam   Injectable 2 milliGRAM(s) IntraMuscular once PRN agitation in setting of psychosis  melatonin 3 milliGRAM(s) Oral at bedtime PRN Insomnia  nicotine  Polacrilex Gum 4 milliGRAM(s) Oral every 2 hours PRN Smoking Cessation

## 2025-04-07 NOTE — BH INPATIENT PSYCHIATRY PROGRESS NOTE - NSBHFUPINTERVALHXFT_PSY_A_CORE
f/up psychosis, care discussed w/ tx team, LETITIA. Patient reported that she was not hearing voices recently, and she could not remember what the voices said last. Patient reported parents visited over the weekend and had a good visit. Patient was calm on approach, not irritable. Patient was instructed that Abilify will be increased to 10mg, to which patient readily agreed. Patient denied SE from meds. AS per team, patient reported voices called her a "pedophile" on friday. Reported sleeping well and with good appetite. no td, tremors or stiffness observed/ reported.

## 2025-04-08 PROCEDURE — 99232 SBSQ HOSP IP/OBS MODERATE 35: CPT

## 2025-04-08 RX ADMIN — Medication 1 MILLIGRAM(S): at 09:12

## 2025-04-08 RX ADMIN — NICOTINE POLACRILEX 4 MILLIGRAM(S): 4 GUM, CHEWING ORAL at 21:36

## 2025-04-08 RX ADMIN — NICOTINE POLACRILEX 4 MILLIGRAM(S): 4 GUM, CHEWING ORAL at 17:37

## 2025-04-08 RX ADMIN — Medication 5 MILLIGRAM(S): at 21:35

## 2025-04-08 RX ADMIN — ARIPIPRAZOLE 10 MILLIGRAM(S): 2 TABLET ORAL at 09:12

## 2025-04-08 RX ADMIN — NICOTINE POLACRILEX 4 MILLIGRAM(S): 4 GUM, CHEWING ORAL at 11:10

## 2025-04-08 NOTE — BH INPATIENT PSYCHIATRY PROGRESS NOTE - CURRENT MEDICATION
MEDICATIONS  (STANDING):  ARIPiprazole 10 milliGRAM(s) Oral daily  risperiDONE  Disintegrating Tablet 1 milliGRAM(s) Oral daily  risperiDONE  Disintegrating Tablet 5 milliGRAM(s) Oral at bedtime    MEDICATIONS  (PRN):  acetaminophen     Tablet .. 650 milliGRAM(s) Oral every 6 hours PRN Moderate Pain (4 - 6)  haloperidol     Tablet 5 milliGRAM(s) Oral every 8 hours PRN agitation due to psychosis  haloperidol    Injectable 5 milliGRAM(s) IntraMuscular Once PRN combative behavior  hydrOXYzine hydrochloride 50 milliGRAM(s) Oral every 8 hours PRN anxiety  LORazepam     Tablet 1 milliGRAM(s) Oral every 8 hours PRN Anxiety  LORazepam   Injectable 2 milliGRAM(s) IntraMuscular once PRN agitation in setting of psychosis  melatonin 3 milliGRAM(s) Oral at bedtime PRN Insomnia  nicotine  Polacrilex Gum 4 milliGRAM(s) Oral every 2 hours PRN Smoking Cessation

## 2025-04-08 NOTE — PHARMACOTHERAPY INTERVENTION NOTE - COMMENTS
Pharmacy received automated report alerting of a discrepancy of patient's recent recorded weights. Patient had prior documented weight of 58.1 kG and has a current documented weight of 168 kG.  Spoke with nurse manager on unit in order to correct and update the patient's weight that is used for medication dosing as well as displayed on the header. Nurse manager will re-educate user to document appropriately.    **When logging patient's weights, please remember to enter in [employer]**    Gill Worrell, PharmD, BCPP

## 2025-04-08 NOTE — BH INPATIENT PSYCHIATRY PROGRESS NOTE - ATTENDING COMMENTS
Care was discussed and reviewed in the interdisciplinary treatment team.  I, Gladys Meraz MD, have reviewed and verified the documentation.  I independently performed the documented medical decision making.      Patient was evaluated with the NP present during the assessment. Patient remains suspicious and internally focus. She denies experiencing any AH or having any delusions. We discuss with the patient that we will need to see her more stable and engaged before we can arrange for her discharge as she is still at high risk of decompensating. We are still in the process of getting her meds adjusted and having her on the [facility] or [facility]. 3DL process reviewed, patient informed us she will think about it and let us know if she retracts the letter.

## 2025-04-08 NOTE — BH INPATIENT PSYCHIATRY PROGRESS NOTE - NSBHCHARTREVIEWVS_PSY_A_CORE FT
Vital Signs Last 24 Hrs  T(C): 36.8 (04-08-25 @ 07:58), Max: 36.8 (04-08-25 @ 07:58)  T(F): 98.2 (04-08-25 @ 07:58), Max: 98.2 (04-08-25 @ 07:58)  HR: --  BP: --  BP(mean): --  RR: --  SpO2: --    Orthostatic VS  04-08-25 @ 07:58  Lying BP: --/-- HR: --  Sitting BP: 100/60 HR: 83  Standing BP: 103/61 HR: 84  Site: --  Mode: --  Orthostatic VS  04-07-25 @ 08:44  Lying BP: --/-- HR: --  Sitting BP: 103/67 HR: 96  Standing BP: --/-- HR: --  Site: --  Mode: --

## 2025-04-08 NOTE — BH INPATIENT PSYCHIATRY PROGRESS NOTE - NSBHFUPINTERVALHXFT_PSY_A_CORE
f/up psychosis, care discussed w/ tx team, LETITIA. Patient observed to be keeping to self on the unit, reported attending meditation group, reported that she was able to  concentrate, handed 72 hr letter, does not remember when she heard voices last. Patient reported that suspicious ideations are decreasing. Patient denied SE from meds. Reported sleeping well and with good appetite. no td, tremors or stiffness observed/ reported. Patient keeping to self, appears guarded with blunted affect in the community. as per team members, patient with less participation and preoccupied in groups

## 2025-04-08 NOTE — BH INPATIENT PSYCHIATRY PROGRESS NOTE - NSBHMETABOLIC_PSY_ALL_CORE_FT
BMI: BMI (kg/m2): 28 (04-05-25 @ 08:39)  HbA1c: A1C with Estimated Average Glucose Result: 5.0 % (03-21-25 @ 10:08)    Glucose:   BP: --Vital Signs Last 24 Hrs  T(C): 36.8 (04-08-25 @ 07:58), Max: 36.8 (04-08-25 @ 07:58)  T(F): 98.2 (04-08-25 @ 07:58), Max: 98.2 (04-08-25 @ 07:58)  HR: --  BP: --  BP(mean): --  RR: --  SpO2: --    Orthostatic VS  04-08-25 @ 07:58  Lying BP: --/-- HR: --  Sitting BP: 100/60 HR: 83  Standing BP: 103/61 HR: 84  Site: --  Mode: --  Orthostatic VS  04-07-25 @ 08:44  Lying BP: --/-- HR: --  Sitting BP: 103/67 HR: 96  Standing BP: --/-- HR: --  Site: --  Mode: --    Lipid Panel: Date/Time: 03-21-25 @ 10:08  Cholesterol, Serum: 169  LDL Cholesterol Calculated: 104  HDL Cholesterol, Serum: 57  Total Cholesterol/HDL Ration Measurement: --  Triglycerides, Serum: 40

## 2025-04-09 PROCEDURE — 99232 SBSQ HOSP IP/OBS MODERATE 35: CPT

## 2025-04-09 RX ORDER — LORAZEPAM 4 MG/ML
1 VIAL (ML) INJECTION EVERY 8 HOURS
Refills: 0 | Status: DISCONTINUED | OUTPATIENT
Start: 2025-04-09 | End: 2025-04-11

## 2025-04-09 RX ORDER — LORAZEPAM 4 MG/ML
2 VIAL (ML) INJECTION ONCE
Refills: 0 | Status: DISCONTINUED | OUTPATIENT
Start: 2025-04-09 | End: 2025-04-11

## 2025-04-09 RX ADMIN — NICOTINE POLACRILEX 4 MILLIGRAM(S): 4 GUM, CHEWING ORAL at 13:47

## 2025-04-09 RX ADMIN — Medication 5 MILLIGRAM(S): at 20:27

## 2025-04-09 RX ADMIN — NICOTINE POLACRILEX 4 MILLIGRAM(S): 4 GUM, CHEWING ORAL at 09:51

## 2025-04-09 RX ADMIN — NICOTINE POLACRILEX 4 MILLIGRAM(S): 4 GUM, CHEWING ORAL at 20:28

## 2025-04-09 RX ADMIN — Medication 1 MILLIGRAM(S): at 09:16

## 2025-04-09 RX ADMIN — ARIPIPRAZOLE 10 MILLIGRAM(S): 2 TABLET ORAL at 09:16

## 2025-04-09 NOTE — BH INPATIENT PSYCHIATRY PROGRESS NOTE - NSBHMETABOLIC_PSY_ALL_CORE_FT
BMI: BMI (kg/m2): 28 (04-05-25 @ 08:39)  HbA1c: A1C with Estimated Average Glucose Result: 5.0 % (03-21-25 @ 10:08)    Glucose:   BP: --Vital Signs Last 24 Hrs  T(C): 36.4 (04-09-25 @ 07:54), Max: 36.4 (04-09-25 @ 07:54)  T(F): 97.5 (04-09-25 @ 07:54), Max: 97.5 (04-09-25 @ 07:54)  HR: --  BP: --  BP(mean): --  RR: --  SpO2: --    Orthostatic VS  04-09-25 @ 07:54  Lying BP: --/-- HR: --  Sitting BP: 124/72 HR: 101  Standing BP: 95/65 HR: 93  Site: --  Mode: --  Orthostatic VS  04-08-25 @ 07:58  Lying BP: --/-- HR: --  Sitting BP: 100/60 HR: 83  Standing BP: 103/61 HR: 84  Site: --  Mode: --    Lipid Panel: Date/Time: 03-21-25 @ 10:08  Cholesterol, Serum: 169  LDL Cholesterol Calculated: 104  HDL Cholesterol, Serum: 57  Total Cholesterol/HDL Ration Measurement: --  Triglycerides, Serum: 40

## 2025-04-09 NOTE — BH INPATIENT PSYCHIATRY PROGRESS NOTE - NSBHATTESTATTENDNAMEFT_PSY_A_CORE
Dr. Sheffield

## 2025-04-09 NOTE — BH TREATMENT PLAN - NSTXDEPRESINTERMD_PSY_ALL_CORE
Psychopharmacology  Psychoeducation

## 2025-04-09 NOTE — BH INPATIENT PSYCHIATRY DISCHARGE NOTE - NSDCMRMEDTOKEN_GEN_ALL_CORE_FT
Uzedy 200 mg/0.56 mL subcutaneous suspension, extended release: 200 milligram(s) subcutaneous every 4 weeks   Abilify 10 mg oral tablet: 1 tab(s) orally once a day  RisperDAL 1 mg oral tablet: 1 tab(s) orally once a day  RisperDAL 1 mg oral tablet: 5 tab(s) orally once a day (at bedtime)  Uzedy 200 mg/0.56 mL subcutaneous suspension, extended release: 200 milligram(s) subcutaneous every 4 weeks

## 2025-04-09 NOTE — BH INPATIENT PSYCHIATRY PROGRESS NOTE - NSBHCHARTREVIEWVS_PSY_A_CORE FT
Vital Signs Last 24 Hrs  T(C): 36.4 (04-09-25 @ 07:54), Max: 36.4 (04-09-25 @ 07:54)  T(F): 97.5 (04-09-25 @ 07:54), Max: 97.5 (04-09-25 @ 07:54)  HR: --  BP: --  BP(mean): --  RR: --  SpO2: --    Orthostatic VS  04-09-25 @ 07:54  Lying BP: --/-- HR: --  Sitting BP: 124/72 HR: 101  Standing BP: 95/65 HR: 93  Site: --  Mode: --  Orthostatic VS  04-08-25 @ 07:58  Lying BP: --/-- HR: --  Sitting BP: 100/60 HR: 83  Standing BP: 103/61 HR: 84  Site: --  Mode: --

## 2025-04-09 NOTE — BH TREATMENT PLAN - NSTXDEPRESINTERRN_PSY_ALL_CORE
Educate patient regarding appropriate coping skills to manage depressive symptoms and encourage to utilize. Administer medication as prescribed and monitor for effectiveness.
Educate patient regarding appropriate coping skills to manage depressive symptoms and encourage to utilize. Administer medication as prescribed and monitor for effectiveness.
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
## 2025-04-09 NOTE — BH INPATIENT PSYCHIATRY PROGRESS NOTE - NSBHATTESTAPPBILLTIME_PSY_A_CORE
I attest my time as EPHRAIM is greater than 50% of the total combined time spent on qualifying patient care activities. I have reviewed and verified the documentation.
I attest my time as EPRHAIM is greater than 50% of the total combined time spent on qualifying patient care activities. I have reviewed and verified the documentation.
I attest my time as EPHRAIM is greater than 50% of the total combined time spent on qualifying patient care activities. I have reviewed and verified the documentation.

## 2025-04-09 NOTE — BH INPATIENT PSYCHIATRY DISCHARGE NOTE - OTHER PAST PSYCHIATRIC HISTORY (INCLUDE DETAILS REGARDING ONSET, COURSE OF ILLNESS, INPATIENT/OUTPATIENT TREATMENT)
25 y/o female , single, recent [employer] graduate (December),  with no significant medical hx ,who was BIB her mother for medication refills.

## 2025-04-09 NOTE — BH TREATMENT PLAN - NSTXDCOPNOINTERSW_PSY_ALL_CORE
SW will meet with Patient to provide supportive psychotherapy and psychoeducation towards Patient's goal. SW will explore Patient's lapse in care and create consistent outpatient goals. SW will meet wit
LMSW will encourage pt to take all medications as prescribed and attend all outpatient appointments.
LMSW will encourage pt to take all medications as prescribed and attend all outpatient appointments.

## 2025-04-09 NOTE — BH INPATIENT PSYCHIATRY DISCHARGE NOTE - NSBHFUPINTERVALHXFT_PSY_A_CORE
f/up psychosis, care discussed w/ tx team, LETITIA.  Patient was seen and evaluated today by this writer. Patient reported that she is feeling much better and is looking forward to being able to leave the hospital. I have review with the patient her medications and that her mother will help with the administration. Patient is aware of the follow up appointment with [facility]. Patient is future oriented and is looking forward to go shopping with her sister. Patient is denying any SI and has been able to contract for safety. Denies any HI or hallucinations and no delusions have been elicited. The patient has been educated about the safety plan and understands that if she feels like harming herself or others, she can call 911 or go to any ER. During the hospital stay, the patient has not demonstrated any aggressive or self-injurious behaviors, and this has been consistent with my observations and the observations of the staff.

## 2025-04-09 NOTE — BH TREATMENT PLAN - NSTXSUICIDINTERRN_PSY_ALL_CORE
Assess for suicidality. Educate patient regarding appropriate coping skills and encourage to utilize
Assess for suicidality. Educate patient regarding appropriate coping skills and encourage to utilize

## 2025-04-09 NOTE — BH INPATIENT PSYCHIATRY DISCHARGE NOTE - NSDCRECOMMENDACTIVITYFT_PSY_ALL_CORE
family to hold onto all meds and to administer them, family needs to keep meds in a locked cabinet.

## 2025-04-09 NOTE — BH TREATMENT PLAN - NSCMSPTSTRENGTHS_PSY_ALL_CORE
Expressive of emotions/Future/goal oriented/Self confidence/Supportive family
Expressive of emotions/Future/goal oriented/Self confidence/Supportive family
Future/goal oriented/Motivated/Supportive family
Future/goal oriented/Self-reliant/Supportive family
Future/goal oriented/Self-reliant/Supportive family
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
## 2025-04-09 NOTE — BH TREATMENT PLAN - NSTXPROBDCOPNO_PSY_ALL_CORE
DISCHARGE ISSUE - NON-ADHERENT WITH OUTPATIENT SERVICES
Chronic Hepatitis B    Dyslipidemia    Endometriosis    GERD (gastroesophageal reflux disease)    History of Nephrotic Syndrome    HTN (hypertension)    Latex allergy    Membranous Glomerulonephropathy    Obesity, Class III, BMI 40-49.9 (morbid obesity)
DISCHARGE ISSUE - NON-ADHERENT WITH OUTPATIENT SERVICES
DISCHARGE ISSUE - NON-ADHERENT WITH OUTPATIENT SERVICES

## 2025-04-09 NOTE — BH INPATIENT PSYCHIATRY PROGRESS NOTE - NSBHATTESTATTENDPERFORM_PSY_A_CORE
Medical Decision Making

## 2025-04-09 NOTE — BH INPATIENT PSYCHIATRY DISCHARGE NOTE - HOSPITAL COURSE
On admission interview as per provider: "Patient is seen in the group room under no acute distress and amenable to interview. The patient reported having graduated from college in January and got her first ever job as a . After coming home from college, she decided to stop taking medication because of concerns of weight gain and feelings of "loopiness".     She does acknowledge that the medication helped with her "delusional thoughts", primarily concerning whether people around her dislike her. She reports that many "fleeting, weird things" have happened. For example, she reports auditory hallucinations of voices telling her to cut herself or kill herself during times of stress. She has been feeling overwhelmed at work, because she feels everyone "hates her" because they are losing patience with her. She also had a recent verbal altercation with her sister over who would carry the laundry, and states that she is no longer talking to her sister.     Because of these stressors, the patient felt overwhelmed and impulsively attempted a suicidal overdose on Friday. She went to [facility], purchased a box of Benadryl and consumed half the box. She did not tell anyone, and currently, she does not regret the overdose. She reports having overdosed in the past several times, but she cannot remember how many times. She reports feelings of emptiness, mood lability, fears of abandonment and rejection, psychosis during times of stress, and suicidal thoughts.     She denies any persistent symptoms of depression and describes her mood more as "ups and downs". She denies other symptoms of a major depressive episode. Reports stable sleep, stable energy, stable concentration. She denies any symptoms of beck including decreased need for sleep, pressured speech, or grandiosity. She denies any history of physical or sexual abuse. She denies any recent drug use. The patient reports past trials of risperidone, Abilify, Wellbutrin, and propranolol. She’s not sure if the medication are effective for her. She declines consent for me to speak with her mother. Currently, patient denies SI/HI/AVH".     During the course of hospitalization, patient endorsed CAH to harm herself, had paranoid ideations, irritability towards family, staff. Patient often reported that staff was saying things about her when in fact it was the voices. Patient was able to realize that as symptoms improved. Patient had passive SI, no I/P at times throughout the hospitalization.     Patient was initially started on Risperdal which was titrated to total of 6mg, and Uzedy 250mg IM was given on 4/2. Patient was instructed to continue oral dose for 3 weeks. Dose is Uzedy 250mg IM every 2 months, next dose due on 5/28/25. Abilify was added for residual sx of psychosis, titrated to 10mg with good effects.     Patient's symptoms gradually improved over the course of the hospitalization.  There was notable improvement in psychosis. Reported that she is no longer hearing voices.    There were no behavioral problems on the unit.  Patient did not become agitated and did not require emergent intramuscular medications. Patient interacted with peers and engaged in groups. Sleep and appetite improved.    On day of discharge, the patient has improved significantly and no longer requires inpatient treatment and care. Patient denies all suicidal and aggressive ideation, intent and plan. Patient is not judged to be an acute danger to self or others at this time.    Patient was educated about side effects of  Risperdal/ Uzedy/ Abilify including EPS, TD, and metabolic syndrome.  Patient’s family agreed with discharge plan and felt that it was safe for patient to return home.   Patient and family were provided with emergency phone numbers and were instructed to call 911 or go to the nearest ER for worsening of symptoms, dangerousness to self/others.  Extensive psychoeducation provided on substance use and mental illness provided.     The patient has a low acute risk and chronic risk of self-harm. Protective factors include no SI, no SIB, no current mood sx, no hopelessness, no access to firearms.  Chronic risk factors include chronic course of presenting illness, recent SA, substance use hx, dx of schizophrenia. Immediate risk was minimized by inpatient admission to a safe environment with appropriate supervision and limited access to lethal means. Future risk was minimized before discharge  providing relevant patient education, discussing emergency procedures, and ensuring close follow-up. The patient remains at a low acute risk of self-harm, and such risk cannot be further ameliorated by continued inpatient treatment and the patient is therefore appropriate for discharge.

## 2025-04-09 NOTE — BH TREATMENT PLAN - NSBHPRIMARYDX_PSY_ALL_CORE
Psychosis, unspecified psychosis type    

## 2025-04-09 NOTE — BH TREATMENT PLAN - NSTXPSYCHOINTERRN_PSY_ALL_CORE
Administer medication as prescribed. Redirect and reorient as needed.
Administer medication as prescribed. Redirect and reorient as needed.

## 2025-04-09 NOTE — BH INPATIENT PSYCHIATRY PROGRESS NOTE - CURRENT MEDICATION
MEDICATIONS  (STANDING):  ARIPiprazole 10 milliGRAM(s) Oral daily  risperiDONE  Disintegrating Tablet 5 milliGRAM(s) Oral at bedtime  risperiDONE  Disintegrating Tablet 1 milliGRAM(s) Oral daily    MEDICATIONS  (PRN):  acetaminophen     Tablet .. 650 milliGRAM(s) Oral every 6 hours PRN Moderate Pain (4 - 6)  haloperidol     Tablet 5 milliGRAM(s) Oral every 8 hours PRN agitation due to psychosis  haloperidol    Injectable 5 milliGRAM(s) IntraMuscular Once PRN combative behavior  hydrOXYzine hydrochloride 50 milliGRAM(s) Oral every 8 hours PRN anxiety  LORazepam     Tablet 1 milliGRAM(s) Oral every 8 hours PRN Anxiety  LORazepam   Injectable 2 milliGRAM(s) IntraMuscular once PRN agitation in setting of psychosis  melatonin 3 milliGRAM(s) Oral at bedtime PRN Insomnia  nicotine  Polacrilex Gum 4 milliGRAM(s) Oral every 2 hours PRN Smoking Cessation

## 2025-04-09 NOTE — BH INPATIENT PSYCHIATRY DISCHARGE NOTE - REASON FOR ADMISSION
Patient was brought in by mother after patient overdosed on benadryl and for worsening psychotic symptoms in the context of medication non-adherence.

## 2025-04-09 NOTE — BH INPATIENT PSYCHIATRY PROGRESS NOTE - NSBHFUPINTERVALHXFT_PSY_A_CORE
f/up psychosis, care discussed w/ tx team, VSS.  Patient denied SE from meds. Reported sleeping well and with good appetite. no td, tremors or stiffness observed/ reported. Patient attending groups, has been appropriate, denied AH for the last few days, calmer on approach, reported understanding that she realizes that she has sx due to not taking her meds. Discussed patients' dx of schizophrenia, and patient was understanding about it. Patient tolerating Abilify. as per team, patient is appropriate in groups.

## 2025-04-09 NOTE — BH TREATMENT PLAN - NSPTSTATEDGOAL_PSY_ALL_CORE
"I have a lot of shame"
patient wants to go home. 
"I have a lot of shame"
Patient wants to be discharged. 
patient wants to go home.

## 2025-04-09 NOTE — BH TREATMENT PLAN - NSDCCRITERIA_PSY_ALL_CORE
Improvement in psychosis and suicidality

## 2025-04-09 NOTE — BH TREATMENT PLAN - NSTXPSYCHOINTERPR_PSY_ALL_CORE
Psychiatric rehabilitation recommends patient attends 2-3 groups per day, engages in individual sessions and participates in activities on the milieu in order to explore coping strategies to better manage symptoms.
Writer met with Pt to assess her progress towards Psych Rehab goal. Pt was receptive but superficial and discharge focused during the session. Pt reports doing well and described her mood as neutral. Pt added that she feels ready to go home. Pt reports agitation only once because her mother did not bring her magazines during family visitation. Pt denies feeling fearful and AH. Over the past week Pt made some progress towards Psych Rehab goal. Pt identified reading magazines, singing songs and coloring as her coping skills to mitigate AH. Pt attended 75% of groups. Pt participates with encouragement and at times leaves the groups early due to lack of focus. Pt is visible on the unit and mostly to herself. Pt is compliant with all her meds. Over the next seven days, Psychiatric rehabilitation staff will continue to provide encouragement, support, psychotherapy, and psychoeducation to assist the Pt in the progression of her treatment goal.
Psychiatric rehabilitation recommends patient attends 2-3 groups per day, engages in individual sessions and participates in activities on the milieu in order to explore coping strategies to better manage symptoms.

## 2025-04-10 PROCEDURE — 99232 SBSQ HOSP IP/OBS MODERATE 35: CPT

## 2025-04-10 RX ORDER — RISPERIDONE 4 MG
5 TABLET ORAL
Qty: 75 | Refills: 0
Start: 2025-04-10 | End: 2025-04-24

## 2025-04-10 RX ORDER — ARIPIPRAZOLE 2 MG/1
1 TABLET ORAL
Qty: 15 | Refills: 0
Start: 2025-04-10 | End: 2025-04-24

## 2025-04-10 RX ORDER — RISPERIDONE 4 MG
1 TABLET ORAL
Qty: 15 | Refills: 0
Start: 2025-04-10 | End: 2025-04-24

## 2025-04-10 RX ADMIN — Medication 650 MILLIGRAM(S): at 16:23

## 2025-04-10 RX ADMIN — NICOTINE POLACRILEX 4 MILLIGRAM(S): 4 GUM, CHEWING ORAL at 18:01

## 2025-04-10 RX ADMIN — Medication 3 MILLIGRAM(S): at 20:48

## 2025-04-10 RX ADMIN — ARIPIPRAZOLE 10 MILLIGRAM(S): 2 TABLET ORAL at 09:02

## 2025-04-10 RX ADMIN — NICOTINE POLACRILEX 4 MILLIGRAM(S): 4 GUM, CHEWING ORAL at 09:02

## 2025-04-10 RX ADMIN — Medication 5 MILLIGRAM(S): at 20:48

## 2025-04-10 RX ADMIN — Medication 1 MILLIGRAM(S): at 09:01

## 2025-04-10 NOTE — BH INPATIENT PSYCHIATRY PROGRESS NOTE - NSTXSUICIDPROGRES_PSY_ALL_CORE
Met - goal discontinued
Met - goal discontinued
Improving
Met - goal discontinued
Improving
Improving
Met - goal discontinued
Met - goal discontinued
Improving
Met - goal discontinued

## 2025-04-10 NOTE — BH INPATIENT PSYCHIATRY PROGRESS NOTE - NSBHMSEGROOM_PSY_A_CORE
Good
Fair
Good
Good
Fair
Good
Fair
Good

## 2025-04-10 NOTE — BH DISCHARGE NOTE NURSING/SOCIAL WORK/PSYCH REHAB - NSDCVIVACCINE_GEN_ALL_CORE_FT
influenza, injectable, quadrivalent, preservative free; 14-Oct-2023 15:10; Mila Rosenberg (RN); [employer]; [id number] (Exp. Date: 30-Jun-2024); IntraMuscular; Deltoid Left.; 0.5 milliLiter(s); VIS (VIS Published: 06-Aug-2021, VIS Presented: 14-Oct-2023);   Influenza, split virus, trivalent, preservative free; 13-Mar-2025 09:51; Artem Whitney (RN); [employer]; [id number] (Exp. Date: 30-Jun-2025); IntraMuscular; Deltoid Left.; 0.5 milliLiter(s); VIS (VIS Published: 06-Aug-2021, VIS Presented: 13-Mar-2025);

## 2025-04-10 NOTE — BH INPATIENT PSYCHIATRY PROGRESS NOTE - NSTXPSYCHOINTERMD_PSY_ALL_CORE
psychopharmacology

## 2025-04-10 NOTE — BH INPATIENT PSYCHIATRY PROGRESS NOTE - NSBHMSEAFFRANGE_PSY_A_CORE
Constricted
Blunted
Blunted
Constricted
Constricted
Blunted
Constricted
Constricted
Blunted
Blunted
Full
Full
Blunted
Full
Constricted
Constricted
Full
Constricted
Blunted
Constricted
Blunted

## 2025-04-10 NOTE — BH DISCHARGE NOTE NURSING/SOCIAL WORK/PSYCH REHAB - NSCDUDCCRISIS_PSY_A_CORE
[facility]  1 [phone]) [facility]-WELL ([phone])  Text "WELL" to 93354  Website: www.[employer]/.[employer] 1 (355) 621-ZRBE (9604) Website: www.safehorizon.org/.National Suicide Prevention Lifeline 1 (007) 870-2571/.  [employer]  1 (877) LIFENET (268-9506)/.  [facility]  [phone] [place] [phone]   Hours:  Monday through Friday from 9 AM to 3 PM/988 Suicide and Crisis Lifeline

## 2025-04-10 NOTE — BH INPATIENT PSYCHIATRY PROGRESS NOTE - NSBHMSETHTCONTENT_PSY_A_CORE
Delusions/Preoccupations/Other
Delusions/Hopelessness
Delusions/Hopelessness
Delusions/Preoccupations/Other
Delusions/Preoccupations
Delusions/Preoccupations/Other
Delusions/Preoccupations
Delusions/Preoccupations/Ruminations
Delusions/Hopelessness
Delusions/Preoccupations/Other
Delusions/Preoccupations
Delusions/Preoccupations/Other
Delusions/Preoccupations
Delusions/Preoccupations/Ruminations
Delusions/Preoccupations/Other
Delusions/Preoccupations/Hopelessness
Delusions/Preoccupations/Hopelessness
Delusions/Preoccupations
Delusions/Preoccupations/Other

## 2025-04-10 NOTE — BH INPATIENT PSYCHIATRY PROGRESS NOTE - NSBHMETABOLIC_PSY_ALL_CORE_FT
BMI: BMI (kg/m2): 28 (04-05-25 @ 08:39)  HbA1c: A1C with Estimated Average Glucose Result: 5.0 % (03-21-25 @ 10:08)    Glucose:   BP: --Vital Signs Last 24 Hrs  T(C): 36.4 (04-10-25 @ 07:30), Max: 36.4 (04-10-25 @ 07:30)  T(F): 97.5 (04-10-25 @ 07:30), Max: 97.5 (04-10-25 @ 07:30)  HR: --  BP: --  BP(mean): --  RR: --  SpO2: --    Orthostatic VS  04-10-25 @ 07:30  Lying BP: --/-- HR: --  Sitting BP: 99/67 HR: 95  Standing BP: 108/62 HR: 98  Site: --  Mode: --  Orthostatic VS  04-09-25 @ 07:54  Lying BP: --/-- HR: --  Sitting BP: 124/72 HR: 101  Standing BP: 95/65 HR: 93  Site: --  Mode: --    Lipid Panel: Date/Time: 03-21-25 @ 10:08  Cholesterol, Serum: 169  LDL Cholesterol Calculated: 104  HDL Cholesterol, Serum: 57  Total Cholesterol/HDL Ration Measurement: --  Triglycerides, Serum: 40

## 2025-04-10 NOTE — BH INPATIENT PSYCHIATRY PROGRESS NOTE - NSTXPSYCHODATEEST_PSY_ALL_CORE
12-Mar-2025
13-Mar-2025
12-Mar-2025
13-Mar-2025
12-Mar-2025
13-Mar-2025
13-Mar-2025
12-Mar-2025

## 2025-04-10 NOTE — BH INPATIENT PSYCHIATRY PROGRESS NOTE - PRN MEDS
MEDICATIONS  (PRN):  acetaminophen     Tablet .. 650 milliGRAM(s) Oral every 6 hours PRN Moderate Pain (4 - 6)  haloperidol     Tablet 5 milliGRAM(s) Oral every 8 hours PRN agitation due to psychosis  haloperidol    Injectable 5 milliGRAM(s) IntraMuscular Once PRN combative behavior  LORazepam     Tablet 2 milliGRAM(s) Oral every 8 hours PRN Anxiety  LORazepam   Injectable 2 milliGRAM(s) IntraMuscular once PRN agitation in setting of psychosis  melatonin 3 milliGRAM(s) Oral at bedtime PRN Insomnia  nicotine  Polacrilex Gum 4 milliGRAM(s) Oral every 2 hours PRN Smoking Cessation  
MEDICATIONS  (PRN):  acetaminophen     Tablet .. 650 milliGRAM(s) Oral every 6 hours PRN Moderate Pain (4 - 6)  haloperidol     Tablet 5 milliGRAM(s) Oral every 8 hours PRN agitation due to psychosis  haloperidol    Injectable 5 milliGRAM(s) IntraMuscular Once PRN combative behavior  hydrOXYzine hydrochloride 50 milliGRAM(s) Oral every 8 hours PRN anxiety  LORazepam     Tablet 1 milliGRAM(s) Oral every 8 hours PRN Anxiety  LORazepam   Injectable 2 milliGRAM(s) IntraMuscular once PRN agitation in setting of psychosis  melatonin 3 milliGRAM(s) Oral at bedtime PRN Insomnia  nicotine  Polacrilex Gum 4 milliGRAM(s) Oral every 2 hours PRN Smoking Cessation  
MEDICATIONS  (PRN):  acetaminophen     Tablet .. 650 milliGRAM(s) Oral every 6 hours PRN Moderate Pain (4 - 6)  haloperidol     Tablet 5 milliGRAM(s) Oral every 8 hours PRN agitation due to psychosis  haloperidol    Injectable 5 milliGRAM(s) IntraMuscular Once PRN combative behavior  LORazepam     Tablet 2 milliGRAM(s) Oral every 8 hours PRN Anxiety  LORazepam   Injectable 2 milliGRAM(s) IntraMuscular once PRN agitation in setting of psychosis  melatonin 3 milliGRAM(s) Oral at bedtime PRN Insomnia  nicotine  Polacrilex Gum 4 milliGRAM(s) Oral every 2 hours PRN Smoking Cessation  
MEDICATIONS  (PRN):  acetaminophen     Tablet .. 650 milliGRAM(s) Oral every 6 hours PRN Moderate Pain (4 - 6)  haloperidol     Tablet 5 milliGRAM(s) Oral every 8 hours PRN agitation due to psychosis  haloperidol    Injectable 5 milliGRAM(s) IntraMuscular Once PRN combative behavior  LORazepam     Tablet 2 milliGRAM(s) Oral every 8 hours PRN Anxiety  melatonin 3 milliGRAM(s) Oral at bedtime PRN Insomnia  nicotine  Polacrilex Gum 4 milliGRAM(s) Oral every 2 hours PRN Smoking Cessation  
MEDICATIONS  (PRN):  acetaminophen     Tablet .. 650 milliGRAM(s) Oral every 6 hours PRN Moderate Pain (4 - 6)  haloperidol     Tablet 5 milliGRAM(s) Oral every 8 hours PRN agitation due to psychosis  haloperidol    Injectable 5 milliGRAM(s) IntraMuscular Once PRN combative behavior  hydrOXYzine hydrochloride 50 milliGRAM(s) Oral every 8 hours PRN anxiety  LORazepam     Tablet 1 milliGRAM(s) Oral every 8 hours PRN Anxiety  LORazepam   Injectable 2 milliGRAM(s) IntraMuscular once PRN agitation in setting of psychosis  melatonin 3 milliGRAM(s) Oral at bedtime PRN Insomnia  nicotine  Polacrilex Gum 4 milliGRAM(s) Oral every 2 hours PRN Smoking Cessation  
MEDICATIONS  (PRN):  acetaminophen     Tablet .. 650 milliGRAM(s) Oral every 6 hours PRN Moderate Pain (4 - 6)  haloperidol     Tablet 5 milliGRAM(s) Oral every 8 hours PRN agitation due to psychosis  haloperidol    Injectable 5 milliGRAM(s) IntraMuscular Once PRN combative behavior  LORazepam     Tablet 2 milliGRAM(s) Oral every 8 hours PRN Anxiety  LORazepam   Injectable 2 milliGRAM(s) IntraMuscular once PRN agitation in setting of psychosis  melatonin 3 milliGRAM(s) Oral at bedtime PRN Insomnia  nicotine  Polacrilex Gum 4 milliGRAM(s) Oral every 2 hours PRN Smoking Cessation  
MEDICATIONS  (PRN):  acetaminophen     Tablet .. 650 milliGRAM(s) Oral every 6 hours PRN Moderate Pain (4 - 6)  haloperidol     Tablet 5 milliGRAM(s) Oral every 8 hours PRN agitation due to psychosis  haloperidol    Injectable 5 milliGRAM(s) IntraMuscular Once PRN combative behavior  LORazepam     Tablet 2 milliGRAM(s) Oral every 8 hours PRN Anxiety  melatonin 3 milliGRAM(s) Oral at bedtime PRN Insomnia  nicotine  Polacrilex Gum 4 milliGRAM(s) Oral every 2 hours PRN Smoking Cessation  
MEDICATIONS  (PRN):  acetaminophen     Tablet .. 650 milliGRAM(s) Oral every 6 hours PRN Moderate Pain (4 - 6)  haloperidol     Tablet 5 milliGRAM(s) Oral every 8 hours PRN agitation due to psychosis  haloperidol    Injectable 5 milliGRAM(s) IntraMuscular Once PRN combative behavior  hydrOXYzine hydrochloride 50 milliGRAM(s) Oral every 8 hours PRN anxiety  LORazepam     Tablet 1 milliGRAM(s) Oral every 8 hours PRN Anxiety  LORazepam   Injectable 2 milliGRAM(s) IntraMuscular once PRN agitation in setting of psychosis  melatonin 3 milliGRAM(s) Oral at bedtime PRN Insomnia  nicotine  Polacrilex Gum 4 milliGRAM(s) Oral every 2 hours PRN Smoking Cessation  
MEDICATIONS  (PRN):  acetaminophen     Tablet .. 650 milliGRAM(s) Oral every 6 hours PRN Moderate Pain (4 - 6)  haloperidol     Tablet 5 milliGRAM(s) Oral every 8 hours PRN agitation due to psychosis  haloperidol    Injectable 5 milliGRAM(s) IntraMuscular Once PRN combative behavior  LORazepam     Tablet 2 milliGRAM(s) Oral every 8 hours PRN Anxiety  LORazepam   Injectable 2 milliGRAM(s) IntraMuscular once PRN agitation in setting of psychosis  melatonin 3 milliGRAM(s) Oral at bedtime PRN Insomnia  nicotine  Polacrilex Gum 4 milliGRAM(s) Oral every 2 hours PRN Smoking Cessation  
MEDICATIONS  (PRN):  acetaminophen     Tablet .. 650 milliGRAM(s) Oral every 6 hours PRN Moderate Pain (4 - 6)  haloperidol     Tablet 5 milliGRAM(s) Oral every 8 hours PRN agitation due to psychosis  haloperidol    Injectable 5 milliGRAM(s) IntraMuscular Once PRN combative behavior  hydrOXYzine hydrochloride 50 milliGRAM(s) Oral every 8 hours PRN anxiety  LORazepam     Tablet 1 milliGRAM(s) Oral every 8 hours PRN Anxiety  LORazepam   Injectable 2 milliGRAM(s) IntraMuscular once PRN agitation in setting of psychosis  melatonin 3 milliGRAM(s) Oral at bedtime PRN Insomnia  nicotine  Polacrilex Gum 4 milliGRAM(s) Oral every 2 hours PRN Smoking Cessation  
MEDICATIONS  (PRN):  acetaminophen     Tablet .. 650 milliGRAM(s) Oral every 6 hours PRN Moderate Pain (4 - 6)  haloperidol     Tablet 5 milliGRAM(s) Oral every 8 hours PRN agitation due to psychosis  haloperidol    Injectable 5 milliGRAM(s) IntraMuscular Once PRN combative behavior  LORazepam     Tablet 2 milliGRAM(s) Oral every 8 hours PRN Anxiety  LORazepam   Injectable 2 milliGRAM(s) IntraMuscular once PRN agitation in setting of psychosis  melatonin 3 milliGRAM(s) Oral at bedtime PRN Insomnia  nicotine  Polacrilex Gum 4 milliGRAM(s) Oral every 2 hours PRN Smoking Cessation  
MEDICATIONS  (PRN):  acetaminophen     Tablet .. 650 milliGRAM(s) Oral every 6 hours PRN Moderate Pain (4 - 6)  haloperidol     Tablet 5 milliGRAM(s) Oral every 8 hours PRN agitation due to psychosis  haloperidol    Injectable 5 milliGRAM(s) IntraMuscular Once PRN combative behavior  hydrOXYzine hydrochloride 50 milliGRAM(s) Oral every 8 hours PRN anxiety  LORazepam     Tablet 1 milliGRAM(s) Oral every 8 hours PRN Anxiety  LORazepam   Injectable 2 milliGRAM(s) IntraMuscular once PRN agitation in setting of psychosis  melatonin 3 milliGRAM(s) Oral at bedtime PRN Insomnia  nicotine  Polacrilex Gum 4 milliGRAM(s) Oral every 2 hours PRN Smoking Cessation  

## 2025-04-10 NOTE — BH INPATIENT PSYCHIATRY PROGRESS NOTE - NSBHCHARTREVIEWVS_PSY_A_CORE FT
Vital Signs Last 24 Hrs  T(C): 36.4 (04-10-25 @ 07:30), Max: 36.4 (04-10-25 @ 07:30)  T(F): 97.5 (04-10-25 @ 07:30), Max: 97.5 (04-10-25 @ 07:30)  HR: --  BP: --  BP(mean): --  RR: --  SpO2: --    Orthostatic VS  04-10-25 @ 07:30  Lying BP: --/-- HR: --  Sitting BP: 99/67 HR: 95  Standing BP: 108/62 HR: 98  Site: --  Mode: --  Orthostatic VS  04-09-25 @ 07:54  Lying BP: --/-- HR: --  Sitting BP: 124/72 HR: 101  Standing BP: 95/65 HR: 93  Site: --  Mode: --

## 2025-04-10 NOTE — BH INPATIENT PSYCHIATRY PROGRESS NOTE - NSBHMSERELATED_PSY_A_CORE
Fair
Poor
Poor
Fair
Poor
Fair

## 2025-04-10 NOTE — BH INPATIENT PSYCHIATRY PROGRESS NOTE - NSBHFUPINTERVALCCFT_PSY_A_CORE
reported feeling "good"
reported feeling "better". 
reported feeling "good"
reported feeling "good". 
reported feeling "good". 
reported feeling "good"
reported feeling "good". 
"The issue is myself"
psychosis
reported feeling "better". 
reported feeling "good". 
reported feeling "good". 
"I keep hearing voices". 
reported feeling "lot better". 
reported feeling "good". 
"I don't feel comfortable with the nurses". 
reported feeling "good". 
psychosis
reported feeling "good"
reported feeling "good". 
paranoia/AH
reported feeling "good".
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
## 2025-04-10 NOTE — BH DISCHARGE NOTE NURSING/SOCIAL WORK/PSYCH REHAB - NSDCPEWEB_GEN_ALL_CORE
[facility] for Tobacco Control website --- [email or web address] website --- [email or web address]

## 2025-04-10 NOTE — BH INPATIENT PSYCHIATRY PROGRESS NOTE - NSBHCONTPROVIDER_PSY_ALL_CORE
Not applicable
Not applicable
Yes
Not applicable
No...

## 2025-04-10 NOTE — BH INPATIENT PSYCHIATRY PROGRESS NOTE - NSBHMSEJUDGE_PSY_A_CORE
Fair
Poor
Fair
Poor
Fair

## 2025-04-10 NOTE — BH DISCHARGE NOTE NURSING/SOCIAL WORK/PSYCH REHAB - DISCHARGE INSTRUCTIONS AFTERCARE APPOINTMENTS
In order to check the location, date, or time of your aftercare appointment, please refer to your Discharge Instructions Document given to you upon leaving the hospital.  If you have lost the instructions please call [phone]

## 2025-04-10 NOTE — BH INPATIENT PSYCHIATRY PROGRESS NOTE - NSBHMSEMOOD_PSY_A_CORE
Normal
Other
Depressed
Normal
Other
Depressed
Normal
Other
Depressed
Normal
Depressed
Normal
Normal

## 2025-04-10 NOTE — BH INPATIENT PSYCHIATRY PROGRESS NOTE - NSBHCONSDANGERSELF_PSY_A_CORE
suicidal behavior

## 2025-04-10 NOTE — BH INPATIENT PSYCHIATRY PROGRESS NOTE - NSTXSUICIDDATETRGT_PSY_ALL_CORE
19-Mar-2025

## 2025-04-10 NOTE — BH INPATIENT PSYCHIATRY PROGRESS NOTE - NSBHMSETHTPROC_PSY_A_CORE
Linear
Illogical/Impaired reasoning
Linear
Illogical/Impaired reasoning
Linear
Illogical/Impaired reasoning
Illogical/Impaired reasoning
Linear
Impaired reasoning
Impaired reasoning
Linear

## 2025-04-10 NOTE — BH INPATIENT PSYCHIATRY PROGRESS NOTE - NSBHMSEEYE_PSY_A_CORE
Poor
Fair
Poor
Fair
Poor
Poor
Fair
Fair
Poor
Fair
Poor
Poor
Fair
Poor
Poor
Fair
Fair

## 2025-04-10 NOTE — BH INPATIENT PSYCHIATRY PROGRESS NOTE - NSBHMSEKNOWHOW_PSY_ALL_CORE
Educational attainment/Vocabulary

## 2025-04-10 NOTE — BH DISCHARGE NOTE NURSING/SOCIAL WORK/PSYCH REHAB - PATIENT PORTAL LINK FT
You can access the FollowMyHealth Patient Portal offered by [facility] by registering at the following website: [email or web address]. By joining [facility]’s FollowMyHealth portal, you will also be able to view your health information using other applications (apps) compatible with our system.

## 2025-04-10 NOTE — BH INPATIENT PSYCHIATRY PROGRESS NOTE - NSBHMSEINSIGHT_PSY_A_CORE
Fair
Fair
Poor
Fair
Poor
Fair
Fair
Poor
Fair
Fair
Poor
Fair
Poor
Poor
Fair
Poor
Fair
Poor
Poor

## 2025-04-10 NOTE — BH INPATIENT PSYCHIATRY PROGRESS NOTE - GENERAL APPEARANCE
No deformities present

## 2025-04-10 NOTE — BH INPATIENT PSYCHIATRY PROGRESS NOTE - NSTXPSYCHOGOAL_PSY_ALL_CORE
Will identify 2 coping skills that help mitigate hallucinations

## 2025-04-10 NOTE — BH INPATIENT PSYCHIATRY PROGRESS NOTE - NSBHPSYCHOLCOGORIENT_PSY_A_CORE
Oriented to time, place, person, situation

## 2025-04-10 NOTE — BH INPATIENT PSYCHIATRY PROGRESS NOTE - NSTXDCOPNODATETRGT_PSY_ALL_CORE
21-Mar-2025
21-Mar-2025
28-Mar-2025
21-Mar-2025
15-Apr-2025
21-Mar-2025
28-Mar-2025
11-Apr-2025
28-Mar-2025
11-Apr-2025
28-Mar-2025
28-Mar-2025
21-Mar-2025
04-Apr-2025
11-Apr-2025
04-Apr-2025
21-Mar-2025
21-Mar-2025
11-Apr-2025
28-Mar-2025

## 2025-04-10 NOTE — BH INPATIENT PSYCHIATRY PROGRESS NOTE - NSTXDEPRESINTERMD_PSY_ALL_CORE
Psychopharmacology  Psychoeducation

## 2025-04-10 NOTE — BH INPATIENT PSYCHIATRY PROGRESS NOTE - NSTXPSYCHODATETRGT_PSY_ALL_CORE
03-Apr-2025
27-Mar-2025
27-Mar-2025
03-Apr-2025
20-Mar-2025
20-Mar-2025
15-Apr-2025
03-Apr-2025
27-Mar-2025
03-Apr-2025
03-Apr-2025
20-Mar-2025
03-Apr-2025
20-Mar-2025
03-Apr-2025
20-Mar-2025
03-Apr-2025
27-Mar-2025
20-Mar-2025
03-Apr-2025
27-Mar-2025
03-Apr-2025

## 2025-04-10 NOTE — BH INPATIENT PSYCHIATRY PROGRESS NOTE - NSBHATTESTTYPEVISIT_PSY_A_CORE
EPHRAIM without on-site Attending supervision
On-site Attending supervising EPHRAIM (99XXX codes)
EPHRAIM without on-site Attending supervision
On-site Attending supervising EPHRAIM (99XXX codes)
EPHRAIM without on-site Attending supervision
On-site Attending supervising EPHRAIM (99XXX codes)
EPHRAIM without on-site Attending supervision
Attending Only
On-site Attending supervising EPHRAIM (99XXX codes)
Attending Only
On-site Attending supervising EPHRAIM (99XXX codes)
On-site Attending supervising EPHRAIM (99XXX codes)
Attending Only

## 2025-04-10 NOTE — BH INPATIENT PSYCHIATRY PROGRESS NOTE - NSBHMSEAFFQUAL_PSY_A_CORE
Depressed
Euthymic
Depressed
Anxious
Irritable
Anxious
Depressed
Anxious
Depressed
Euthymic
Depressed
Euthymic
Depressed
Euthymic
Depressed
Depressed

## 2025-04-10 NOTE — BH DISCHARGE NOTE NURSING/SOCIAL WORK/PSYCH REHAB - FINANCIAL ASSISTANCE
[facility] provides services at a reduced cost to those who are determined to be eligible through [facility]’s financial assistance program. Information regarding [facility]’s financial assistance program can be found by going to [email or web address] or by calling [phone].

## 2025-04-10 NOTE — BH INPATIENT PSYCHIATRY PROGRESS NOTE - NSBHMSEPERCEPT_PSY_A_CORE
Auditory hallucinations
No abnormalities/Other
No abnormalities
Auditory hallucinations
No abnormalities
Auditory hallucinations
No abnormalities/Other
No abnormalities
No abnormalities/Other
No abnormalities
Auditory hallucinations
No abnormalities
Auditory hallucinations
No abnormalities/Other
Auditory hallucinations
Auditory hallucinations
No abnormalities/Other

## 2025-04-10 NOTE — BH INPATIENT PSYCHIATRY PROGRESS NOTE - NSBHMSEAFFCONG_PSY_A_CORE
Non-congruent
Congruent
Non-congruent
Congruent
Non-congruent
Congruent
Non-congruent
Congruent
Non-congruent
Non-congruent

## 2025-04-10 NOTE — BH INPATIENT PSYCHIATRY PROGRESS NOTE - NSTXDEPRESDATEEST_PSY_ALL_CORE
13-Mar-2025

## 2025-04-10 NOTE — BH INPATIENT PSYCHIATRY PROGRESS NOTE - NSTXDCOPNOGOAL_PSY_ALL_CORE
Will agree to participate in appropriate outpatient care

## 2025-04-10 NOTE — BH INPATIENT PSYCHIATRY PROGRESS NOTE - NSBHFUPINTERVALHXFT_PSY_A_CORE
f/up psychosis, care discussed w/ tx team, VSS.  Patient denied SE from meds except for feeling a little fatigued. Patient is attending groups and interacting with selective peers. Reported she has been doing well and denies all psychiatric symptoms. Denies hearing voices and denies feeling paranoid about the staff or her family. She is looking forward to go home and start her treatment with the [facility]. Patient was educated again about her medications and importance of continuing to be compliant with the meds. She is aware that her parents will assist with medication administration.     I spoke with mother today and review the discharge plans. Mother denies any concerns and will come to  the patient tomorrow.

## 2025-04-10 NOTE — BH INPATIENT PSYCHIATRY PROGRESS NOTE - NSBHMSESPEECH_PSY_A_CORE
Normal volume, rate, productivity, spontaneity and articulation

## 2025-04-10 NOTE — BH INPATIENT PSYCHIATRY PROGRESS NOTE - NSTXPSYCHOPROGRES_PSY_ALL_CORE
No Change
Improving
No Change
No Change
Improving
No Change
Met - goal discontinued
No Change
Improving
Improving
No Change

## 2025-04-10 NOTE — BH INPATIENT PSYCHIATRY PROGRESS NOTE - NSBHMSEBEHAV_PSY_A_CORE
Cooperative
superficially cooperative, guarded,/Cooperative
Cooperative
superficially cooperative, guarded,/Cooperative
Cooperative
superficially cooperative, guarded,/Cooperative
superficially cooperative, guarded,/Cooperative
Cooperative

## 2025-04-10 NOTE — BH INPATIENT PSYCHIATRY PROGRESS NOTE - NSTXDCOPNODATEEST_PSY_ALL_CORE
28-Mar-2025
21-Mar-2025
28-Mar-2025
21-Mar-2025
04-Apr-2025
04-Apr-2025
14-Mar-2025
21-Mar-2025
21-Mar-2025
04-Apr-2025
21-Mar-2025
28-Mar-2025
14-Mar-2025
21-Mar-2025
14-Mar-2025
14-Mar-2025
04-Apr-2025
21-Mar-2025
14-Mar-2025
28-Mar-2025
21-Mar-2025
04-Apr-2025
14-Mar-2025
14-Mar-2025

## 2025-04-10 NOTE — BH INPATIENT PSYCHIATRY PROGRESS NOTE - NSDCCRITERIA_PSY_ALL_CORE
Improvement in psychosis and suicidality

## 2025-04-10 NOTE — BH INPATIENT PSYCHIATRY PROGRESS NOTE - NSTXDEPRESGOAL_PSY_ALL_CORE
Attend and participate in at least 2 groups daily despite low mood/energy

## 2025-04-10 NOTE — BH INPATIENT PSYCHIATRY PROGRESS NOTE - NSTXDEPRESDATETRGT_PSY_ALL_CORE
20-Mar-2025
15-Apr-2025
20-Mar-2025

## 2025-04-10 NOTE — BH DISCHARGE NOTE NURSING/SOCIAL WORK/PSYCH REHAB - NSDCPRGOAL_PSY_ALL_CORE
During this hospitalization Pt made improvements in overall mood and symptoms. Pt denies feeling fearful and AH. Pt reported feeling much calmer with clarity in thinking. Pt reported that she is looking forward going home. Pt appeared goal-oriented shared plans of getting her hair done and follow up with her job while continuing in outpt program of [facility].  Pt made progress towards Psych Rehab goal. Pt identified coloring mandalas, journaling and word search as her coping skills.  Pt attended only 65% of groups. Pt was fully engaged during all groups. Pt completed a safety plan.

## 2025-04-10 NOTE — BH INPATIENT PSYCHIATRY PROGRESS NOTE - NSTXDEPRESPROGRES_PSY_ALL_CORE
Improving
No Change
Improving
Improving
No Change
Improving
No Change
Improving
No Change
Improving
No Change
Improving

## 2025-04-11 VITALS — RESPIRATION RATE: 19 BRPM | TEMPERATURE: 98 F

## 2025-04-11 RX ADMIN — ARIPIPRAZOLE 10 MILLIGRAM(S): 2 TABLET ORAL at 09:23

## 2025-04-11 RX ADMIN — NICOTINE POLACRILEX 4 MILLIGRAM(S): 4 GUM, CHEWING ORAL at 09:23

## 2025-04-11 RX ADMIN — Medication 1 MILLIGRAM(S): at 09:23

## 2025-04-21 NOTE — BH INPATIENT PSYCHIATRY ASSESSMENT NOTE - SUBSTANCE USE
I called pt to discuss.  Her symptoms are better but still bothersome.  I offered for her to see a nurse practitioner to evaluate her symptoms only and let her know she needs to keep her ob follow up scheduled for 04/29/25.  Pt voiced understanding.    
Yes

## 2025-05-05 NOTE — BH PATIENT PROFILE - PATIENT REPRESENTATIVE: ( YOU CAN CHOOSE ANY PERSON THAT CAN ASSIST YOU WITH YOUR HEALTH CARE PREFERENCES, DOES NOT HAVE TO BE A SPOUSE, IMMEDIATE FAMILY OR SIGNIFICANT OTHER/PARTNER)
Problem: Diabetes  Goal: Achieve decreasing blood glucose levels by end of shift  Outcome: Progressing  Goal: Increase stability of blood glucose readings by end of shift  Outcome: Progressing  Goal: Decrease in ketones present in urine by end of shift  Outcome: Progressing  Goal: Maintain electrolyte levels within acceptable range throughout shift  Outcome: Progressing  Goal: Maintain glucose levels >70mg/dl to <250mg/dl throughout shift  Outcome: Progressing  Goal: No changes in neurological exam by end of shift  Outcome: Progressing  Goal: Learn about and adhere to nutrition recommendations by end of shift  Outcome: Progressing  Goal: Vital signs within normal range for age by end of shift  Outcome: Progressing  Goal: Increase self care and/or family involovement by end of shift  Outcome: Progressing  Goal: Receive DSME education by end of shift  Outcome: Progressing     Problem: Pain - Adult  Goal: Verbalizes/displays adequate comfort level or baseline comfort level  Outcome: Progressing     Problem: Safety - Adult  Goal: Free from fall injury  Outcome: Progressing     Problem: Discharge Planning  Goal: Discharge to home or other facility with appropriate resources  Outcome: Progressing     Problem: Chronic Conditions and Co-morbidities  Goal: Patient's chronic conditions and co-morbidity symptoms are monitored and maintained or improved  Outcome: Progressing     Problem: Nutrition  Goal: Nutrient intake appropriate for maintaining nutritional needs  Outcome: Progressing     Problem: Skin  Goal: Decreased wound size/increased tissue granulation at next dressing change  Outcome: Progressing  Goal: Participates in plan/prevention/treatment measures  Outcome: Progressing  Flowsheets (Taken 5/5/2025 4506)  Participates in plan/prevention/treatment measures:   Discuss with provider PT/OT consult   Elevate heels   Increase activity/out of bed for meals  Goal: Prevent/manage excess moisture  Outcome:  Progressing  Goal: Prevent/minimize sheer/friction injuries  Outcome: Progressing  Goal: Promote/optimize nutrition  Outcome: Progressing  Goal: Promote skin healing  Outcome: Progressing     Problem: Fall/Injury  Goal: Not fall by end of shift  Outcome: Progressing  Goal: Be free from injury by end of the shift  Outcome: Progressing  Goal: Verbalize understanding of personal risk factors for fall in the hospital  Outcome: Progressing  Goal: Verbalize understanding of risk factor reduction measures to prevent injury from fall in the home  Outcome: Progressing  Goal: Use assistive devices by end of the shift  Outcome: Progressing  Goal: [facility] activities to prevent fatigue by end of the shift  Outcome: Progressing     Problem: Pain  Goal: Takes deep breaths with improved pain control throughout the shift  Outcome: Progressing  Goal: Turns in bed with improved pain control throughout the shift  Outcome: Progressing  Goal: Walks with improved pain control throughout the shift  Outcome: Progressing  Goal: Performs ADL's with improved pain control throughout shift  Outcome: Progressing  Goal: Participates in PT with improved pain control throughout the shift  Outcome: Progressing  Goal: Free from opioid side effects throughout the shift  Outcome: Progressing  Goal: Free from acute confusion related to pain meds throughout the shift  Outcome: Progressing     Problem: Deep Vein Thrombosis  Goal: I will remain free from complications of deep vein thrombosis and maintain current level of mobility  Outcome: Progressing     Problem: Heart Failure  Goal: Improved urinary output this shift  Outcome: Progressing  Goal: Reduction in peripheral edema within 24 hours  Outcome: Progressing  Goal: Report improvement of dyspnea/breathlessness this shift  Outcome: Progressing  Goal: Weight from fluid excess reduced over 2-3 days, then stabilize  Outcome: Progressing  Goal: Increase self care and/or family involvement in 24 hours  Outcome:  Progressing     Problem: Infective UTI/pyelonephritis  Goal: Manages/understands urgency, continence  Outcome: Progressing  Goal: No signs of neurosensory, musculoskeletal, cardiac changes  Outcome: Progressing  Goal: No worsening signs of infection  Outcome: Progressing  Goal: Stable weight and I&O  Outcome: Progressing   The patient's goals for the shift include comfort    The clinical goals for the shift include iv antibiotics, discharge planning    Over the shift, the patient did not make progress toward the following goals. Barriers to progression include na. Recommendations to address these barriers include na.     declines

## 2025-06-11 NOTE — ED PROVIDER NOTE - NS ED MD DISPO ADMIT [facility]SIDE UNIT
The cause of your chest pain is not definitively established however it does not appear to be serious or life-threatening at this time.  Please call [facility] cardiology to follow-up within 1 week, ideally by the end of this week.  If symptoms change or worsen in any way in the meantime including you develop fever, break out suddenly sweating, changes in the chest pain including location, character or intensity or you develop shortness of breath or worsening of your symptoms please return promptly.    Regarding the incidental findings of the pericardial effusion which is a very small amount of fluid around your heart this can be followed up with an echocardiogram if [facility] cardiology thinks it is indicated.  The cyst on your lung is very likely benign and not of any consequence but this can be discussed with your primary care doctor as well.        Thank you for choosing our Emergency Department for your care.  It is our privilege to care for you in your time of need.  In the next several days, you may receive a survey via email or mailed to your home about your experience with our team.  We would greatly appreciate you taking a few minutes to complete the survey, as we use this information to learn what we have done well and what we could be doing better. Thank you for trusting us with your care!    Below you will find a list of your tests from today's visit.   Labs and Radiology Studies  Recent Results (from the past 12 hours)   CBC with Auto Differential    Collection Time: 06/10/25 11:01 PM   Result Value Ref Range    WBC 4.3 4.1 - 11.1 K/uL    RBC 4.86 4.10 - 5.70 M/uL    Hemoglobin 12.2 12.1 - 17.0 g/dL    Hematocrit 37.9 36.6 - 50.3 %    MCV 78.0 (L) 80.0 - 99.0 FL    MCH 25.1 (L) 26.0 - 34.0 PG    MCHC 32.2 30.0 - 36.5 g/dL    RDW 17.4 (H) 11.5 - 14.5 %    Platelets 98 (L) 150 - 400 K/uL    Nucleated RBCs 0.0 0.0  WBC    nRBC 0.00 0.00 - 0.01 K/uL    Neutrophils % 57.0 32 - 75 %    Lymphocytes 
HP1S
